# Patient Record
Sex: MALE | Race: WHITE | NOT HISPANIC OR LATINO | Employment: OTHER | URBAN - METROPOLITAN AREA
[De-identification: names, ages, dates, MRNs, and addresses within clinical notes are randomized per-mention and may not be internally consistent; named-entity substitution may affect disease eponyms.]

---

## 2017-05-25 ENCOUNTER — LAB VISIT (OUTPATIENT)
Dept: LAB | Facility: HOSPITAL | Age: 62
End: 2017-05-25
Payer: COMMERCIAL

## 2017-05-25 ENCOUNTER — OFFICE VISIT (OUTPATIENT)
Dept: FAMILY MEDICINE | Facility: CLINIC | Age: 62
End: 2017-05-25
Payer: COMMERCIAL

## 2017-05-25 VITALS
WEIGHT: 170.44 LBS | RESPIRATION RATE: 16 BRPM | DIASTOLIC BLOOD PRESSURE: 78 MMHG | HEART RATE: 62 BPM | SYSTOLIC BLOOD PRESSURE: 128 MMHG | HEIGHT: 69 IN | BODY MASS INDEX: 25.24 KG/M2 | OXYGEN SATURATION: 98 % | TEMPERATURE: 97 F

## 2017-05-25 DIAGNOSIS — E78.5 DYSLIPIDEMIA: ICD-10-CM

## 2017-05-25 DIAGNOSIS — Z00.00 ROUTINE ADULT HEALTH MAINTENANCE: ICD-10-CM

## 2017-05-25 DIAGNOSIS — I10 ESSENTIAL HYPERTENSION: Primary | ICD-10-CM

## 2017-05-25 DIAGNOSIS — Z11.59 NEED FOR HEPATITIS C SCREENING TEST: ICD-10-CM

## 2017-05-25 LAB
ALBUMIN SERPL BCP-MCNC: 4.1 G/DL
ALP SERPL-CCNC: 76 U/L
ALT SERPL W/O P-5'-P-CCNC: 17 U/L
ANION GAP SERPL CALC-SCNC: 12 MMOL/L
AST SERPL-CCNC: 25 U/L
BASOPHILS # BLD AUTO: 0.01 K/UL
BASOPHILS NFR BLD: 0.2 %
BILIRUB SERPL-MCNC: 0.7 MG/DL
BUN SERPL-MCNC: 12 MG/DL
CALCIUM SERPL-MCNC: 9.9 MG/DL
CHLORIDE SERPL-SCNC: 106 MMOL/L
CHOLEST/HDLC SERPL: 3.7 {RATIO}
CO2 SERPL-SCNC: 28 MMOL/L
COMPLEXED PSA SERPL-MCNC: 2.4 NG/ML
CREAT SERPL-MCNC: 1.3 MG/DL
DIFFERENTIAL METHOD: NORMAL
EOSINOPHIL # BLD AUTO: 0.1 K/UL
EOSINOPHIL NFR BLD: 1.4 %
ERYTHROCYTE [DISTWIDTH] IN BLOOD BY AUTOMATED COUNT: 12.6 %
EST. GFR  (AFRICAN AMERICAN): >60 ML/MIN/1.73 M^2
EST. GFR  (NON AFRICAN AMERICAN): 58.1 ML/MIN/1.73 M^2
GLUCOSE SERPL-MCNC: 75 MG/DL
HCT VFR BLD AUTO: 44.5 %
HDL/CHOLESTEROL RATIO: 26.8 %
HDLC SERPL-MCNC: 205 MG/DL
HDLC SERPL-MCNC: 55 MG/DL
HGB BLD-MCNC: 15 G/DL
LDLC SERPL CALC-MCNC: 134.4 MG/DL
LYMPHOCYTES # BLD AUTO: 1.3 K/UL
LYMPHOCYTES NFR BLD: 23.1 %
MCH RBC QN AUTO: 29.6 PG
MCHC RBC AUTO-ENTMCNC: 33.7 %
MCV RBC AUTO: 88 FL
MONOCYTES # BLD AUTO: 0.5 K/UL
MONOCYTES NFR BLD: 8.1 %
NEUTROPHILS # BLD AUTO: 3.8 K/UL
NEUTROPHILS NFR BLD: 67 %
NONHDLC SERPL-MCNC: 150 MG/DL
PLATELET # BLD AUTO: 243 K/UL
PMV BLD AUTO: 10.7 FL
POTASSIUM SERPL-SCNC: 4.1 MMOL/L
PROT SERPL-MCNC: 7.4 G/DL
RBC # BLD AUTO: 5.06 M/UL
SODIUM SERPL-SCNC: 146 MMOL/L
TRIGL SERPL-MCNC: 78 MG/DL
WBC # BLD AUTO: 5.59 K/UL

## 2017-05-25 PROCEDURE — 86803 HEPATITIS C AB TEST: CPT

## 2017-05-25 PROCEDURE — 99999 PR PBB SHADOW E&M-EST. PATIENT-LVL III: CPT | Mod: PBBFAC,,, | Performed by: INTERNAL MEDICINE

## 2017-05-25 PROCEDURE — 80053 COMPREHEN METABOLIC PANEL: CPT

## 2017-05-25 PROCEDURE — 99396 PREV VISIT EST AGE 40-64: CPT | Mod: S$GLB,,, | Performed by: INTERNAL MEDICINE

## 2017-05-25 PROCEDURE — 85025 COMPLETE CBC W/AUTO DIFF WBC: CPT

## 2017-05-25 PROCEDURE — 84153 ASSAY OF PSA TOTAL: CPT

## 2017-05-25 PROCEDURE — 36415 COLL VENOUS BLD VENIPUNCTURE: CPT | Mod: PO

## 2017-05-25 PROCEDURE — 80061 LIPID PANEL: CPT

## 2017-05-25 RX ORDER — AMLODIPINE AND BENAZEPRIL HYDROCHLORIDE 10; 20 MG/1; MG/1
1 CAPSULE ORAL DAILY
Qty: 30 CAPSULE | Refills: 12 | Status: SHIPPED | OUTPATIENT
Start: 2017-05-25 | End: 2018-07-09 | Stop reason: SDUPTHER

## 2017-05-25 NOTE — PROGRESS NOTES
Subjective:       Patient ID: Jose Long is a 61 y.o. male.    Chief Complaint: Annual Exam; Hypertension; and Hyperlipidemia    Hypertension   The problem is controlled. Pertinent negatives include no chest pain, headaches, neck pain, palpitations or shortness of breath. Past treatments include calcium channel blockers and ACE inhibitors. The current treatment provides significant improvement.   Hyperlipidemia   Pertinent negatives include no chest pain, myalgias or shortness of breath. Current antihyperlipidemic treatment includes exercise and diet change.     Review of Systems   Constitutional: Negative for activity change, appetite change, chills, diaphoresis, fatigue, fever and unexpected weight change.   HENT: Negative for drooling, ear discharge, ear pain, facial swelling, hearing loss, mouth sores, nosebleeds, postnasal drip, rhinorrhea, sinus pressure, sneezing, sore throat, tinnitus, trouble swallowing and voice change.    Eyes: Negative for photophobia, redness and visual disturbance.   Respiratory: Negative for apnea, cough, choking, chest tightness, shortness of breath and wheezing.    Cardiovascular: Negative for chest pain, palpitations and leg swelling.   Gastrointestinal: Negative for abdominal distention, abdominal pain, anal bleeding, blood in stool, constipation, diarrhea, nausea and vomiting.   Endocrine: Negative for cold intolerance, heat intolerance, polydipsia, polyphagia and polyuria.   Genitourinary: Negative for difficulty urinating, dysuria, enuresis, flank pain, frequency, genital sores, hematuria and urgency.   Musculoskeletal: Negative for arthralgias, back pain, gait problem, joint swelling, myalgias, neck pain and neck stiffness.   Skin: Negative for color change, pallor, rash and wound.   Allergic/Immunologic: Negative for food allergies and immunocompromised state.   Neurological: Negative for dizziness, tremors, seizures, syncope, facial asymmetry, speech difficulty, weakness,  light-headedness, numbness and headaches.   Hematological: Negative for adenopathy. Does not bruise/bleed easily.   Psychiatric/Behavioral: Negative for agitation, behavioral problems, confusion, decreased concentration, dysphoric mood, hallucinations, self-injury, sleep disturbance and suicidal ideas. The patient is not nervous/anxious and is not hyperactive.        Objective:      Physical Exam   Constitutional: He is oriented to person, place, and time. He appears well-developed and well-nourished. No distress.   HENT:   Head: Normocephalic and atraumatic.   Eyes: Pupils are equal, round, and reactive to light. No scleral icterus.   Neck: Normal range of motion. Neck supple. No JVD present. Carotid bruit is not present. No tracheal deviation present. No thyromegaly present.   Cardiovascular: Normal rate, regular rhythm, normal heart sounds and intact distal pulses.    Pulmonary/Chest: Effort normal and breath sounds normal. No respiratory distress. He has no wheezes. He has no rales. He exhibits no tenderness.   Abdominal: Soft. Bowel sounds are normal. He exhibits no distension. There is no tenderness. There is no rebound and no guarding.   Musculoskeletal: Normal range of motion. He exhibits no edema or tenderness.   Lymphadenopathy:     He has no cervical adenopathy.   Neurological: He is alert and oriented to person, place, and time. No cranial nerve deficit. Coordination normal.   Skin: Skin is warm and dry. No rash noted. He is not diaphoretic. No erythema. No pallor.   Psychiatric: He has a normal mood and affect. His behavior is normal. Judgment and thought content normal.       Assessment:       1. Essential hypertension    2. Dyslipidemia    3. Routine adult health maintenance        Plan:        stable-------------continue meds, watch diet,exercise.                  Notes/labs reviewed.           Check cbc,cmp,lipids,psa.                     F/u prn or 1 year-------

## 2017-05-26 ENCOUNTER — TELEPHONE (OUTPATIENT)
Dept: FAMILY MEDICINE | Facility: CLINIC | Age: 62
End: 2017-05-26

## 2017-05-26 LAB — HCV AB SERPL QL IA: NEGATIVE

## 2018-01-06 ENCOUNTER — OFFICE VISIT (OUTPATIENT)
Dept: URGENT CARE | Facility: CLINIC | Age: 63
End: 2018-01-06
Payer: COMMERCIAL

## 2018-01-06 VITALS
WEIGHT: 173.38 LBS | DIASTOLIC BLOOD PRESSURE: 84 MMHG | HEART RATE: 85 BPM | SYSTOLIC BLOOD PRESSURE: 130 MMHG | BODY MASS INDEX: 25.61 KG/M2 | OXYGEN SATURATION: 96 % | TEMPERATURE: 99 F

## 2018-01-06 DIAGNOSIS — J06.9 VIRAL URI: Primary | ICD-10-CM

## 2018-01-06 PROCEDURE — 99213 OFFICE O/P EST LOW 20 MIN: CPT | Mod: S$GLB,,, | Performed by: FAMILY MEDICINE

## 2018-01-06 PROCEDURE — 99999 PR PBB SHADOW E&M-EST. PATIENT-LVL III: CPT | Mod: PBBFAC,,, | Performed by: FAMILY MEDICINE

## 2018-01-06 NOTE — PATIENT INSTRUCTIONS
Viral Upper Respiratory Illness (Adult)  You have a viral upper respiratory illness (URI), which is another term for the common cold. This illness is contagious during the first few days. It is spread through the air by coughing and sneezing. It may also be spread by direct contact (touching the sick person and then touching your own eyes, nose, or mouth). Frequent handwashing will decrease risk of spread. Most viral illnesses go away within 7 to 10 days with rest and simple home remedies. Sometimes the illness may last for several weeks. Antibiotics will not kill a virus, and they are generally not prescribed for this condition.    Home care  · If symptoms are severe, rest at home for the first 2 to 3 days. When you resume activity, don't let yourself get too tired.  · Avoid being exposed to cigarette smoke (yours or others).  · You may use acetaminophen or ibuprofen to control pain and fever, unless another medicine was prescribed. (Note: If you have chronic liver or kidney disease, have ever had a stomach ulcer or gastrointestinal bleeding, or are taking blood-thinning medicines, talk with your healthcare provider before using these medicines.) Aspirin should never be given to anyone under 18 years of age who is ill with a viral infection or fever. It may cause severe liver or brain damage.  · Your appetite may be poor, so a light diet is fine. Avoid dehydration by drinking 6 to 8 glasses of fluids per day (water, soft drinks, juices, tea, or soup). Extra fluids will help loosen secretions in the nose and lungs.  · Over-the-counter cold medicines will not shorten the length of time youre sick, but they may be helpful for the following symptoms: cough, sore throat, and nasal and sinus congestion. (Note: Do not use decongestants if you have high blood pressure.)  Follow-up care  Follow up with your healthcare provider, or as advised.  When to seek medical advice  Call your healthcare provider right away if any  of these occur:  · Cough with lots of colored sputum (mucus)  · Severe headache; face, neck, or ear pain  · Difficulty swallowing due to throat pain  · Fever of 100.4°F (38°C)  Call 911, or get immediate medical care  Call emergency services right away if any of these occur:  · Chest pain, shortness of breath, wheezing, or difficulty breathing  · Coughing up blood  · Inability to swallow due to throat pain  Date Last Reviewed: 9/13/2015  © 2725-9289 [employer]. [address] [place] [id number]. All rights reserved. This information is not intended as a substitute for professional medical care. Always follow your healthcare professional's instructions.

## 2018-01-06 NOTE — PROGRESS NOTES
Subjective:      Patient ID: Jose Long is a 62 y.o. male.    Chief Complaint: Sinus Problem      Past Medical History:   Diagnosis Date    Dyslipidemia     Hypertension     Multiple allergies      No past surgical history on file.  Family History   Problem Relation Age of Onset    Cataracts Mother     Hypertension Mother     Diabetes Maternal Aunt     Macular degeneration Maternal Aunt     Diabetes Paternal Grandmother     Melanoma Neg Hx      Social History     Social History    Marital status: Single     Spouse name: N/A    Number of children: N/A    Years of education: N/A     Occupational History    Not on file.     Social History Main Topics    Smoking status: Former Smoker    Smokeless tobacco: Never Used    Alcohol use No    Drug use: No    Sexual activity: Not on file     Other Topics Concern    Not on file     Social History Narrative    No narrative on file       Current Outpatient Prescriptions:     alprazolam (XANAX) 0.25 MG tablet, Take 1 tablet (0.25 mg total) by mouth 3 (three) times daily as needed for Anxiety (to use prior to dental procedure)., Disp: 10 tablet, Rfl: 0    amlodipine-benazepril 10-20mg (LOTREL) 10-20 mg per capsule, Take 1 capsule by mouth once daily., Disp: 30 capsule, Rfl: 12    cetirizine (ZYRTEC) 10 MG tablet, Take 1 tablet by mouth Daily. 1 Tablet Oral Every day.  To get over-the-counter, Disp: , Rfl:   Review of patient's allergies indicates:  No Known Allergies    Review of Systems   Constitutional: Negative for chills, fatigue and fever.   HENT: Positive for congestion and postnasal drip. Negative for sinus pain and sinus pressure.    Respiratory: Positive for cough. Negative for shortness of breath and wheezing.    Cardiovascular: Negative for chest pain and palpitations.   Gastrointestinal: Negative for abdominal pain and blood in stool.     HPI  Mild symptoms of nasal drainage and postnasal drip.  Cough worse at night.  No sob.  No fever or  chills.  No other symptoms.    Objective:   /84 (BP Location: Left arm, Patient Position: Sitting, BP Method: Small (Manual))   Pulse 85   Temp 99.2 °F (37.3 °C) (Tympanic)   Wt 78.7 kg (173 lb 6.3 oz)   SpO2 96%   BMI 25.61 kg/m²      Physical Exam   Constitutional: He is oriented to person, place, and time. He is cooperative. No distress.   HENT:   Right Ear: Hearing, tympanic membrane, external ear and ear canal normal.   Left Ear: Hearing, tympanic membrane, external ear and ear canal normal.   Mouth/Throat: Uvula is midline, oropharynx is clear and moist and mucous membranes are normal.   Eyes: Conjunctivae and EOM are normal.   Cardiovascular: Normal rate, regular rhythm and normal heart sounds.    Pulmonary/Chest: Effort normal and breath sounds normal.   Musculoskeletal: He exhibits no edema.   Neurological: He is alert and oriented to person, place, and time. Coordination and gait normal.   Skin: Skin is warm, dry and intact. No rash noted. He is not diaphoretic. No erythema.   Psychiatric: He has a normal mood and affect. His speech is normal and behavior is normal.   Nursing note and vitals reviewed.          Assessment:       1. Viral URI            Plan:         Viral URI  Comments:  Self limited.  F/u prn.  Plenty of rest.   f/u if not resolved in a week.        Patient Care Team:  Harshad Hernandez MD as PCP - General (Internal Medicine)    No Follow-up on file.

## 2018-05-25 ENCOUNTER — OFFICE VISIT (OUTPATIENT)
Dept: FAMILY MEDICINE | Facility: CLINIC | Age: 63
End: 2018-05-25
Payer: COMMERCIAL

## 2018-05-25 ENCOUNTER — LAB VISIT (OUTPATIENT)
Dept: LAB | Facility: HOSPITAL | Age: 63
End: 2018-05-25
Attending: INTERNAL MEDICINE
Payer: COMMERCIAL

## 2018-05-25 VITALS
TEMPERATURE: 97 F | WEIGHT: 168 LBS | HEART RATE: 74 BPM | DIASTOLIC BLOOD PRESSURE: 70 MMHG | SYSTOLIC BLOOD PRESSURE: 130 MMHG | BODY MASS INDEX: 24.05 KG/M2 | HEIGHT: 70 IN

## 2018-05-25 DIAGNOSIS — I10 HYPERTENSION, UNSPECIFIED TYPE: Chronic | ICD-10-CM

## 2018-05-25 DIAGNOSIS — Z12.11 COLON CANCER SCREENING: ICD-10-CM

## 2018-05-25 DIAGNOSIS — E78.5 DYSLIPIDEMIA: ICD-10-CM

## 2018-05-25 DIAGNOSIS — Z00.00 ROUTINE ADULT HEALTH MAINTENANCE: ICD-10-CM

## 2018-05-25 DIAGNOSIS — I10 HYPERTENSION, UNSPECIFIED TYPE: Primary | Chronic | ICD-10-CM

## 2018-05-25 LAB
ALBUMIN SERPL BCP-MCNC: 4.2 G/DL
ALP SERPL-CCNC: 73 U/L
ALT SERPL W/O P-5'-P-CCNC: 21 U/L
ANION GAP SERPL CALC-SCNC: 12 MMOL/L
AST SERPL-CCNC: 23 U/L
BASOPHILS # BLD AUTO: 0.04 K/UL
BASOPHILS NFR BLD: 0.7 %
BILIRUB SERPL-MCNC: 0.6 MG/DL
BUN SERPL-MCNC: 14 MG/DL
CALCIUM SERPL-MCNC: 9.5 MG/DL
CHLORIDE SERPL-SCNC: 105 MMOL/L
CHOLEST SERPL-MCNC: 181 MG/DL
CHOLEST/HDLC SERPL: 3.9 {RATIO}
CO2 SERPL-SCNC: 27 MMOL/L
COMPLEXED PSA SERPL-MCNC: 2.7 NG/ML
CREAT SERPL-MCNC: 1.4 MG/DL
DIFFERENTIAL METHOD: NORMAL
EOSINOPHIL # BLD AUTO: 0.1 K/UL
EOSINOPHIL NFR BLD: 1.2 %
ERYTHROCYTE [DISTWIDTH] IN BLOOD BY AUTOMATED COUNT: 12.2 %
EST. GFR  (AFRICAN AMERICAN): >60 ML/MIN/1.73 M^2
EST. GFR  (NON AFRICAN AMERICAN): 52.7 ML/MIN/1.73 M^2
GLUCOSE SERPL-MCNC: 88 MG/DL
HCT VFR BLD AUTO: 44 %
HDLC SERPL-MCNC: 46 MG/DL
HDLC SERPL: 25.4 %
HGB BLD-MCNC: 14.8 G/DL
IMM GRANULOCYTES # BLD AUTO: 0.02 K/UL
IMM GRANULOCYTES NFR BLD AUTO: 0.4 %
LDLC SERPL CALC-MCNC: 120.8 MG/DL
LYMPHOCYTES # BLD AUTO: 1.3 K/UL
LYMPHOCYTES NFR BLD: 23.6 %
MCH RBC QN AUTO: 29.8 PG
MCHC RBC AUTO-ENTMCNC: 33.6 G/DL
MCV RBC AUTO: 89 FL
MONOCYTES # BLD AUTO: 0.4 K/UL
MONOCYTES NFR BLD: 7.4 %
NEUTROPHILS # BLD AUTO: 3.8 K/UL
NEUTROPHILS NFR BLD: 66.7 %
NONHDLC SERPL-MCNC: 135 MG/DL
NRBC BLD-RTO: 0 /100 WBC
PLATELET # BLD AUTO: 220 K/UL
PMV BLD AUTO: 11.9 FL
POTASSIUM SERPL-SCNC: 3.8 MMOL/L
PROT SERPL-MCNC: 7.3 G/DL
RBC # BLD AUTO: 4.97 M/UL
SODIUM SERPL-SCNC: 144 MMOL/L
TRIGL SERPL-MCNC: 71 MG/DL
WBC # BLD AUTO: 5.64 K/UL

## 2018-05-25 PROCEDURE — 84153 ASSAY OF PSA TOTAL: CPT

## 2018-05-25 PROCEDURE — 99999 PR PBB SHADOW E&M-EST. PATIENT-LVL V: CPT | Mod: PBBFAC,,, | Performed by: INTERNAL MEDICINE

## 2018-05-25 PROCEDURE — 3078F DIAST BP <80 MM HG: CPT | Mod: CPTII,S$GLB,, | Performed by: INTERNAL MEDICINE

## 2018-05-25 PROCEDURE — 3075F SYST BP GE 130 - 139MM HG: CPT | Mod: CPTII,S$GLB,, | Performed by: INTERNAL MEDICINE

## 2018-05-25 PROCEDURE — 85025 COMPLETE CBC W/AUTO DIFF WBC: CPT

## 2018-05-25 PROCEDURE — 36415 COLL VENOUS BLD VENIPUNCTURE: CPT | Mod: PO

## 2018-05-25 PROCEDURE — 80053 COMPREHEN METABOLIC PANEL: CPT

## 2018-05-25 PROCEDURE — 99396 PREV VISIT EST AGE 40-64: CPT | Mod: S$GLB,,, | Performed by: INTERNAL MEDICINE

## 2018-05-25 PROCEDURE — 80061 LIPID PANEL: CPT

## 2018-05-25 RX ORDER — LORATADINE 10 MG/1
TABLET ORAL
COMMUNITY
End: 2019-06-24

## 2018-05-25 NOTE — PROGRESS NOTES
Subjective:       Patient ID: Jose Long is a 62 y.o. male.    Chief Complaint: Annual Exam; Hypertension; and Hyperlipidemia    Hypertension   This is a chronic problem. The problem is controlled. Pertinent negatives include no chest pain, headaches, neck pain, palpitations or shortness of breath.   Hyperlipidemia   Pertinent negatives include no chest pain, myalgias or shortness of breath.     Past Medical History:   Diagnosis Date    Dyslipidemia     Hypertension     Multiple allergies      No past surgical history on file.  Family History   Problem Relation Age of Onset    Cataracts Mother     Hypertension Mother     Diabetes Maternal Aunt     Macular degeneration Maternal Aunt     Diabetes Paternal Grandmother     Melanoma Neg Hx      Social History     Social History    Marital status: Single     Spouse name: N/A    Number of children: N/A    Years of education: N/A     Occupational History    Not on file.     Social History Main Topics    Smoking status: Former Smoker    Smokeless tobacco: Never Used    Alcohol use No    Drug use: No    Sexual activity: Not on file     Other Topics Concern    Not on file     Social History Narrative    No narrative on file     Review of Systems   Constitutional: Negative for activity change, appetite change, chills, diaphoresis, fatigue, fever and unexpected weight change.   HENT: Negative for drooling, ear discharge, ear pain, facial swelling, hearing loss, mouth sores, nosebleeds, postnasal drip, rhinorrhea, sinus pressure, sneezing, sore throat, tinnitus, trouble swallowing and voice change.    Eyes: Negative for photophobia, discharge, redness and visual disturbance.   Respiratory: Negative for apnea, cough, choking, chest tightness, shortness of breath and wheezing.    Cardiovascular: Negative for chest pain, palpitations and leg swelling.   Gastrointestinal: Negative for abdominal distention, abdominal pain, anal bleeding, blood in stool,  constipation, diarrhea, nausea and vomiting.   Endocrine: Negative for cold intolerance, heat intolerance, polydipsia, polyphagia and polyuria.   Genitourinary: Negative for difficulty urinating, dysuria, enuresis, flank pain, frequency, genital sores, hematuria and urgency.   Musculoskeletal: Negative for arthralgias, back pain, gait problem, joint swelling, myalgias, neck pain and neck stiffness.   Skin: Negative for color change, pallor, rash and wound.   Allergic/Immunologic: Negative for food allergies and immunocompromised state.   Neurological: Negative for dizziness, tremors, seizures, syncope, facial asymmetry, speech difficulty, weakness, light-headedness, numbness and headaches.   Hematological: Negative for adenopathy. Does not bruise/bleed easily.   Psychiatric/Behavioral: Negative for agitation, behavioral problems, confusion, decreased concentration, dysphoric mood, hallucinations, self-injury, sleep disturbance and suicidal ideas. The patient is not nervous/anxious and is not hyperactive.        Objective:      Physical Exam   Constitutional: He is oriented to person, place, and time. He appears well-developed and well-nourished. No distress.   HENT:   Head: Normocephalic and atraumatic.   Eyes: Pupils are equal, round, and reactive to light. No scleral icterus.   Neck: Normal range of motion. Neck supple. No JVD present. Carotid bruit is not present. No tracheal deviation present. No thyromegaly present.   Cardiovascular: Normal rate, regular rhythm, normal heart sounds and intact distal pulses.    Pulmonary/Chest: Effort normal and breath sounds normal. No respiratory distress. He has no wheezes. He has no rales. He exhibits no tenderness.   Abdominal: Soft. Bowel sounds are normal. He exhibits no distension. There is no tenderness. There is no rebound and no guarding.   Musculoskeletal: Normal range of motion. He exhibits no edema or tenderness.   Lymphadenopathy:     He has no cervical  adenopathy.   Neurological: He is alert and oriented to person, place, and time. No cranial nerve deficit. Coordination normal.   Skin: Skin is warm and dry. No rash noted. He is not diaphoretic. No erythema. No pallor.   Psychiatric: He has a normal mood and affect. His behavior is normal. Judgment and thought content normal.   Nursing note and vitals reviewed.      CMP  Sodium   Date Value Ref Range Status   05/25/2017 146 (H) 136 - 145 mmol/L Final     Potassium   Date Value Ref Range Status   05/25/2017 4.1 3.5 - 5.1 mmol/L Final     Chloride   Date Value Ref Range Status   05/25/2017 106 95 - 110 mmol/L Final     CO2   Date Value Ref Range Status   05/25/2017 28 23 - 29 mmol/L Final     Glucose   Date Value Ref Range Status   05/25/2017 75 70 - 110 mg/dL Final     BUN, Bld   Date Value Ref Range Status   05/25/2017 12 8 - 23 mg/dL Final     Creatinine   Date Value Ref Range Status   05/25/2017 1.3 0.5 - 1.4 mg/dL Final     Calcium   Date Value Ref Range Status   05/25/2017 9.9 8.7 - 10.5 mg/dL Final     Total Protein   Date Value Ref Range Status   05/25/2017 7.4 6.0 - 8.4 g/dL Final     Albumin   Date Value Ref Range Status   05/25/2017 4.1 3.5 - 5.2 g/dL Final     Total Bilirubin   Date Value Ref Range Status   05/25/2017 0.7 0.1 - 1.0 mg/dL Final     Comment:     For infants and newborns, interpretation of results should be based  on gestational age, weight and in agreement with clinical  observations.  Premature Infant recommended reference ranges:  Up to 24 hours.............<8.0 mg/dL  Up to 48 hours............<12.0 mg/dL  3-5 days..................<15.0 mg/dL  6-29 days.................<15.0 mg/dL       Alkaline Phosphatase   Date Value Ref Range Status   05/25/2017 76 55 - 135 U/L Final     AST   Date Value Ref Range Status   05/25/2017 25 10 - 40 U/L Final     ALT   Date Value Ref Range Status   05/25/2017 17 10 - 44 U/L Final     Anion Gap   Date Value Ref Range Status   05/25/2017 12 8 - 16 mmol/L  Final     eGFR if    Date Value Ref Range Status   05/25/2017 >60.0 >60 mL/min/1.73 m^2 Final     eGFR if non    Date Value Ref Range Status   05/25/2017 58.1 (A) >60 mL/min/1.73 m^2 Final     Comment:     Calculation used to obtain the estimated glomerular filtration  rate (eGFR) is the CKD-EPI equation. Since race is unknown   in our information system, the eGFR values for   -American and Non--American patients are given   for each creatinine result.       Lab Results   Component Value Date    WBC 5.59 05/25/2017    HGB 15.0 05/25/2017    HCT 44.5 05/25/2017    MCV 88 05/25/2017     05/25/2017     Lab Results   Component Value Date    CHOL 205 (H) 05/25/2017     Lab Results   Component Value Date    HDL 55 05/25/2017     Lab Results   Component Value Date    LDLCALC 134.4 05/25/2017     Lab Results   Component Value Date    TRIG 78 05/25/2017     Lab Results   Component Value Date    CHOLHDL 26.8 05/25/2017     Lab Results   Component Value Date    TSH 1.198 01/16/2015     No results found for: LABA1C, HGBA1C  Assessment:       1. Hypertension, unspecified type    2. Dyslipidemia    3. Colon cancer screening    4. Routine adult health maintenance        Plan:   Hypertension, unspecified type-----------------controlled.  -     Comprehensive metabolic panel; Future; Expected date: 05/25/2018  -     CBC auto differential; Future; Expected date: 05/25/2018    Dyslipidemia------watch diet,exercise.  -     Lipid panel; Future; Expected date: 05/25/2018    Colon cancer screening  -     Case request GI: COLONOSCOPY    Routine adult health maintenance  -     Comprehensive metabolic panel; Future; Expected date: 05/25/2018  -     CBC auto differential; Future; Expected date: 05/25/2018  -     PSA, Screening; Future; Expected date: 05/25/2018    Other orders  -     Cancel: Case request GI: COLONOSCOPY                F/u 6-12 months.

## 2018-05-29 ENCOUNTER — DOCUMENTATION ONLY (OUTPATIENT)
Dept: ENDOSCOPY | Facility: HOSPITAL | Age: 63
End: 2018-05-29

## 2018-05-29 RX ORDER — SODIUM, POTASSIUM,MAG SULFATES 17.5-3.13G
SOLUTION, RECONSTITUTED, ORAL ORAL
Qty: 354 ML | Refills: 0 | Status: ON HOLD | OUTPATIENT
Start: 2018-05-29 | End: 2018-06-29 | Stop reason: HOSPADM

## 2018-05-29 NOTE — PROGRESS NOTES
05/29/18 Per [employer], Person pressed touch tone key to speak with an endoscopy .  Pt has been scheduled for colonoscopy on 06/29/18. Oral instructions given and mailed. Suprep ordered.

## 2018-06-28 PROBLEM — Z12.11 COLON CANCER SCREENING: Status: ACTIVE | Noted: 2018-06-28

## 2018-06-29 ENCOUNTER — ANESTHESIA (OUTPATIENT)
Dept: ENDOSCOPY | Facility: HOSPITAL | Age: 63
End: 2018-06-29
Payer: COMMERCIAL

## 2018-06-29 ENCOUNTER — ANESTHESIA EVENT (OUTPATIENT)
Dept: ENDOSCOPY | Facility: HOSPITAL | Age: 63
End: 2018-06-29
Payer: COMMERCIAL

## 2018-06-29 ENCOUNTER — HOSPITAL ENCOUNTER (OUTPATIENT)
Facility: HOSPITAL | Age: 63
Discharge: HOME | End: 2018-06-29
Attending: FAMILY MEDICINE | Admitting: FAMILY MEDICINE
Payer: COMMERCIAL

## 2018-06-29 ENCOUNTER — SURGERY (OUTPATIENT)
Age: 63
End: 2018-06-29

## 2018-06-29 DIAGNOSIS — Z12.11 COLON CANCER SCREENING: Primary | ICD-10-CM

## 2018-06-29 DIAGNOSIS — Z80.0 FAMILY HISTORY OF COLON CANCER: ICD-10-CM

## 2018-06-29 DIAGNOSIS — Z86.010 PERSONAL HISTORY OF COLONIC POLYPS: ICD-10-CM

## 2018-06-29 DIAGNOSIS — K63.5 POLYP OF COLON, UNSPECIFIED PART OF COLON, UNSPECIFIED TYPE: ICD-10-CM

## 2018-06-29 DIAGNOSIS — Z12.11 SPECIAL SCREENING FOR MALIGNANT NEOPLASMS, COLON: ICD-10-CM

## 2018-06-29 PROCEDURE — 88305 TISSUE EXAM BY PATHOLOGIST: CPT | Mod: 26,,, | Performed by: PATHOLOGY

## 2018-06-29 PROCEDURE — 25000003 PHARM REV CODE 250: Performed by: NURSE ANESTHETIST, CERTIFIED REGISTERED

## 2018-06-29 PROCEDURE — 27201089 HC SNARE, DISP (ANY): Performed by: FAMILY MEDICINE

## 2018-06-29 PROCEDURE — 63600175 PHARM REV CODE 636 W HCPCS: Performed by: NURSE ANESTHETIST, CERTIFIED REGISTERED

## 2018-06-29 PROCEDURE — 45385 COLONOSCOPY W/LESION REMOVAL: CPT | Mod: 33,,, | Performed by: FAMILY MEDICINE

## 2018-06-29 PROCEDURE — 25000003 PHARM REV CODE 250: Performed by: FAMILY MEDICINE

## 2018-06-29 PROCEDURE — 88305 TISSUE EXAM BY PATHOLOGIST: CPT | Performed by: PATHOLOGY

## 2018-06-29 PROCEDURE — 45385 COLONOSCOPY W/LESION REMOVAL: CPT | Performed by: FAMILY MEDICINE

## 2018-06-29 PROCEDURE — 37000008 HC ANESTHESIA 1ST 15 MINUTES: Performed by: FAMILY MEDICINE

## 2018-06-29 PROCEDURE — 37000009 HC ANESTHESIA EA ADD 15 MINS: Performed by: FAMILY MEDICINE

## 2018-06-29 RX ORDER — SODIUM CHLORIDE, SODIUM LACTATE, POTASSIUM CHLORIDE, CALCIUM CHLORIDE 600; 310; 30; 20 MG/100ML; MG/100ML; MG/100ML; MG/100ML
INJECTION, SOLUTION INTRAVENOUS CONTINUOUS
Status: DISCONTINUED | OUTPATIENT
Start: 2018-06-29 | End: 2018-06-29 | Stop reason: HOSPADM

## 2018-06-29 RX ORDER — LIDOCAINE HYDROCHLORIDE 20 MG/ML
INJECTION, SOLUTION EPIDURAL; INFILTRATION; INTRACAUDAL; PERINEURAL
Status: DISCONTINUED | OUTPATIENT
Start: 2018-06-29 | End: 2018-06-29

## 2018-06-29 RX ORDER — PROPOFOL 10 MG/ML
VIAL (ML) INTRAVENOUS
Status: DISCONTINUED | OUTPATIENT
Start: 2018-06-29 | End: 2018-06-29

## 2018-06-29 RX ADMIN — PROPOFOL 20 MG: 10 INJECTION, EMULSION INTRAVENOUS at 11:06

## 2018-06-29 RX ADMIN — LIDOCAINE HYDROCHLORIDE 60 MG: 20 INJECTION, SOLUTION EPIDURAL; INFILTRATION; INTRACAUDAL; PERINEURAL at 11:06

## 2018-06-29 RX ADMIN — PROPOFOL 25 MG: 10 INJECTION, EMULSION INTRAVENOUS at 11:06

## 2018-06-29 RX ADMIN — SODIUM CHLORIDE, SODIUM LACTATE, POTASSIUM CHLORIDE, AND CALCIUM CHLORIDE: .6; .31; .03; .02 INJECTION, SOLUTION INTRAVENOUS at 10:06

## 2018-06-29 RX ADMIN — PROPOFOL 80 MG: 10 INJECTION, EMULSION INTRAVENOUS at 11:06

## 2018-06-29 RX ADMIN — PROPOFOL 50 MG: 10 INJECTION, EMULSION INTRAVENOUS at 11:06

## 2018-06-29 NOTE — ANESTHESIA RELEASE NOTE
"Anesthesia Release from PACU Note    Patient: Jose Long    Procedure(s) Performed: Procedure(s) (LRB):  COLONOSCOPY (N/A)    Anesthesia type: MAC    Post pain: Adequate analgesia    Post assessment: no apparent anesthetic complications, tolerated procedure well and no evidence of recall    Last Vitals:   Visit Vitals  BP (!) 157/78 (BP Location: Left arm, Patient Position: Lying)   Pulse 75   Temp 36.6 °C (97.8 °F) (Oral)   Resp 18   Ht 5' 10" (1.778 m)   Wt 73.9 kg (163 lb)   SpO2 97%   BMI 23.39 kg/m²       Post vital signs: stable    Level of consciousness: awake, alert  and oriented    Nausea/Vomiting: no nausea/no vomiting    Complications: none    Airway Patency: patent    Respiratory: unassisted, spontaneous ventilation, room air    Cardiovascular: stable and blood pressure at baseline    Hydration: euvolemic  "

## 2018-06-29 NOTE — H&P
Short Stay Endoscopy History and Physical    PCP - Harshad Hernandez MD    Procedure - Colonoscopy  ASA - 2  Mallampati - per anesthesia  History of Anesthesia problems - no  Family history Anesthesia problems -  no     HPI:  This is a 62 y.o. male here for evaluation of :   Active Hospital Problems    Diagnosis  POA    *Colon cancer screening [Z12.11]  Not Applicable    Special screening for malignant neoplasms, colon [Z12.11]  Not Applicable    Personal history of colonic polyps [Z86.010]  Not Applicable      Resolved Hospital Problems    Diagnosis Date Resolved POA   No resolved problems to display.         Health Maintenance       Date Due Completion Date    Colonoscopy 02/10/2018 2/10/2015    Influenza Vaccine 08/01/2018 10/10/2015    PROSTATE-SPECIFIC ANTIGEN 05/25/2019 5/25/2018    Override on 11/8/2012: Done    Lipid Panel 05/25/2019 5/25/2018          Screening - yes  History of polyps - yes  Diarrhea - no  Anemia - no  Blood in stools - no  Abdominal pain - no  Other - no    ROS:  CONSTITUTIONAL: Denies weight change,  fatigue, fevers, chills, night sweats.  CARDIOVASCULAR: Denies chest pain, shortness of breath, orthopnea and edema.  RESPIRATORY: Denies cough, hemoptysis, dyspnea, and wheezing.  GI: See HPI.    Medical History:   Past Medical History:   Diagnosis Date    Dyslipidemia     Hypertension     Multiple allergies        Surgical History:   History reviewed. No pertinent surgical history.    Family History:   Family History   Problem Relation Age of Onset    Cataracts Mother     Hypertension Mother     Alzheimer's disease Mother     Diabetes Maternal Aunt     Macular degeneration Maternal Aunt     Diabetes Paternal Grandmother     Colon cancer Father     Melanoma Neg Hx        Social History:   Social History   Substance Use Topics    Smoking status: Former Smoker    Smokeless tobacco: Never Used    Alcohol use No       Allergies:   Review of patient's allergies  indicates:  No Known Allergies    Medications:   No current facility-administered medications on file prior to encounter.      Current Outpatient Prescriptions on File Prior to Encounter   Medication Sig Dispense Refill    amlodipine-benazepril 10-20mg (LOTREL) 10-20 mg per capsule Take 1 capsule by mouth once daily. 30 capsule 12    cetirizine (ZYRTEC) 10 MG tablet Take 1 tablet by mouth Daily. 1 Tablet Oral Every day.  To get over-the-counter      loratadine (CLARITIN) 10 mg tablet       alprazolam (XANAX) 0.25 MG tablet Take 1 tablet (0.25 mg total) by mouth 3 (three) times daily as needed for Anxiety (to use prior to dental procedure). 10 tablet 0       Physical Exam:  Vital Signs:   Vitals:    06/29/18 0957   BP: (!) 157/78   Pulse: 75   Resp: 18   Temp: 97.8 °F (36.6 °C)     General Appearance: Well appearing in no acute distress  ENT: OP clear  Chest: CTA B  CV: RRR, no m/r/g  Abd: s/nt/nd/nabs  Ext: no edema    Labs:Reviewed    IMP:  Active Hospital Problems    Diagnosis  POA    *Colon cancer screening [Z12.11]  Not Applicable    Special screening for malignant neoplasms, colon [Z12.11]  Not Applicable    Personal history of colonic polyps [Z86.010]  Not Applicable      Resolved Hospital Problems    Diagnosis Date Resolved POA   No resolved problems to display.         Plan:   I have explained the risks and benefits of colonoscopy to the patient including but not limited to bleeding, perforation, infection, and death. The patient wishes to proceed.

## 2018-06-29 NOTE — [employer] PATIENT INSTRUCTIONS
Discharge Summary/Instructions after an Endoscopic Procedure  Patient Name: Jose Long  Patient MRN: [id number]  Patient YOB: 1955 Friday, June 29, 2018 Kermit Leone MD  RESTRICTIONS:  During your procedure today, you received medications for sedation.  These   medications may affect your judgment, balance and coordination.  Therefore,   for 24 hours, you have the following restrictions:   - DO NOT drive a car, operate machinery, make legal/financial decisions,   sign important papers or drink alcohol.    ACTIVITY:  Today: no heavy lifting, straining or running due to procedural   sedation/anesthesia.  The following day: return to full activity including work.  DIET:  Eat and drink normally unless instructed otherwise.     TREATMENT FOR COMMON SIDE EFFECTS:  - Mild abdominal pain, nausea, belching, bloating or excessive gas:  rest,   eat lightly and use a heating pad.  - Sore Throat: treat with throat lozenges and/or gargle with warm salt   water.  - Because air was used during the procedure, expelling large amounts of air   from your rectum or belching is normal.  - If a bowel prep was taken, you may not have a bowel movement for 1-3 days.    This is normal.  SYMPTOMS TO WATCH FOR AND REPORT TO YOUR PHYSICIAN:  1. Abdominal pain or bloating, other than gas cramps.  2. Chest pain.  3. Back pain.  4. Signs of infection such as: chills or fever occurring within 24 hours   after the procedure.  5. Rectal bleeding, which would show as bright red, maroon, or black stools.   (A tablespoon of blood from the rectum is not serious, especially if   hemorrhoids are present.)  6. Vomiting.  7. Weakness or dizziness.  GO DIRECTLY TO THE NEAREST EMERGENCY ROOM IF YOU HAVE ANY OF THE FOLLOWING:      Difficulty breathing              Chills and/or fever over 101 F   Persistent vomiting and/or vomiting blood   Severe abdominal pain   Severe chest pain   Black, tarry stools   Bleeding- more than one tablespoon   Any  other symptom or condition that you feel may need urgent attention  Your doctor recommends these additional instructions:  If any biopsies were taken, your doctors clinic will contact you in 1 to 2   weeks with any results.  - Patient has a contact number available for emergencies.  The signs and   symptoms of potential delayed complications were discussed with the   patient.  Return to normal activities tomorrow.  Written discharge   instructions were provided to the patient.   - Resume previous diet.   - Continue present medications.   - Await pathology results.   - Repeat colonoscopy in 5 years for surveillance.   - Telephone my office for pathology results in 1 week.   - Discharge patient to home (via wheelchair).  For questions, problems or results please call your physician Kermit Leone MD at Work:  [phone]  If you have any questions about the above instructions, call the GI   department at [phone] or call the endoscopy unit at [phone]   from 7am until 3 pm.  [facility] [place], EMERGENCY ROOM PHONE NUMBER:   [phone]  IF A COMPLICATION OR EMERGENCY SITUATION ARISES AND YOU ARE UNABLE TO REACH   YOUR PHYSICIAN - GO DIRECTLY TO THE EMERGENCY ROOM.  I have read or have had read to me these discharge instructions for my   procedure and have received a written copy.  I understand these   instructions and will follow-up with my physician if I have any questions.     __________________________________       _____________________________________  Nurse Signature                                          Patient/Designated   Responsible Party Signature  Kermit Leone MD  6/29/2018 11:29:28 AM  This report has been verified and signed electronically.  [employer]

## 2018-06-29 NOTE — ANESTHESIA PREPROCEDURE EVALUATION
06/29/2018  Jose Long is a 62 y.o., male.    Anesthesia Evaluation    I have reviewed the Patient Summary Reports.    I have reviewed the Nursing Notes.   I have reviewed the Medications.     Review of Systems  Anesthesia Hx:  No problems with previous Anesthesia Denies Hx of Anesthetic complications  History of prior surgery of interest to airway management or planning: Previous anesthesia: General, MAC Denies Family Hx of Anesthesia complications.   Denies Personal Hx of Anesthesia complications.   Social:  Former Smoker, No Alcohol Use    Hematology/Oncology:  Hematology Normal   Oncology Normal     EENT/Dental:EENT/Dental Normal   Cardiovascular:   Hypertension, well controlled hyperlipidemia    Pulmonary:  Pulmonary Normal    Renal/:  Renal/ Normal     Hepatic/GI:  Hepatic/GI Normal    Musculoskeletal:  Musculoskeletal Normal    Neurological:  Neurology Normal    Endocrine:  Endocrine Normal    Dermatological:  Skin Normal    Psych:  Psychiatric Normal           Physical Exam  General:  Well nourished    Airway/Jaw/Neck:  Airway Findings: Mouth Opening: Normal Tongue: Normal  General Airway Assessment: Adult  Mallampati: II  TM Distance: Normal, at least 6 cm      Dental:  Dental Findings: In tact   Chest/Lungs:  Chest/Lungs Findings: Clear to auscultation, Normal Respiratory Rate     Heart/Vascular:  Heart Findings: Rate: Normal  Rhythm: Regular Rhythm  Sounds: Normal        Mental Status:  Mental Status Findings:  Cooperative, Alert and Oriented         Anesthesia Plan  Type of Anesthesia, risks & benefits discussed:  Anesthesia Type:  MAC  Patient's Preference:   Intra-op Monitoring Plan: standard ASA monitors  Intra-op Monitoring Plan Comments:   Post Op Pain Control Plan:   Post Op Pain Control Plan Comments:   Induction:   IV  Beta Blocker:  Patient is not currently on a Beta-Blocker (No  further documentation required).       Informed Consent: Patient understands risks and agrees with Anesthesia plan.  Questions answered. Anesthesia consent signed with patient.  ASA Score: 2     Day of Surgery Review of History & Physical: I have interviewed and examined the patient. I have reviewed the patient's H&P dated:            Ready For Surgery From Anesthesia Perspective.

## 2018-06-29 NOTE — DISCHARGE INSTRUCTIONS
Understanding Colon and Rectal Polyps    The colon (also called the large intestine) is a muscular tube that forms the last part of the digestive tract. It absorbs water and stores food waste. The colon is about 4 to 6 feet long. The rectum is the last 6 inches of the colon. The colon and rectum have a smooth lining composed of millions of cells. Changes in these cells can lead to growths in the colon that can become cancerous and should be removed. Multiple tests are available to screen for colon cancer, but the colonoscopy is the most recommended test. During colonoscopy, these polyps can be removed. How often you need this test depends on many things including your condition, your family history, symptoms, and what the findings were at the previous colonoscopy.   When the colon lining changes  Changes that happen in the cells that line the colon or rectum can lead to growths called polyps. Over a period of years, polyps can turn cancerous. Removing polyps early may prevent cancer from ever forming.  Polyps  Polyps are fleshy clumps of tissue that form on the lining of the colon or rectum. Small polyps are usually benign (not cancerous). However, over time, cells in a polyp can change and become cancerous. Certain types of polyps known as adenomatous polyps are premalignant. The risk for invasive cancer increases with the size of the polyp and certain cell and gene features. This means that they can become cancerous if they're not removed. Hyperplastic polyps are benign. They can grow quite large and not turn cancerous.   Cancer  Almost all colorectal cancers start when polyp cells begin growing abnormally. As a cancerous tumor grows, it may involve more and more of the colon or rectum. In time, cancer can also grow beyond the colon or rectum and spread to nearby organs or to glands called lymph nodes. The cells can also travel to other parts of the body. This is known as metastasis. The earlier a cancerous  tumor is removed, the better the chance of preventing its spread.    Date Last Reviewed: 8/1/2016  © 7029-2539 [employer]. [address] [place] [address], [place] [address]. All rights reserved. This information is not intended as a substitute for professional medical care. Always follow your healthcare professional's instructions.        Hemorrhoids    Hemorrhoids are swollen and inflamed veins inside the rectum and near the anus. The rectum is the last several inches of the colon. The anus is the passage between the rectum and the outside of the body.  Causes  The veins can become swollen due to increased pressure in them. This is most often caused by:  · Chronic constipation or diarrhea  · Straining when having a bowel movement  · Sitting too long on the toilet  · A low-fiber diet  · Pregnancy  Symptoms  · Bleeding from the rectum (this may be noticeable after bowel movements)  · Lump near the anus  · Itching around the anus  · Pain around the anus  There are different types of hemorrhoids. Depending on the type you have and the severity, you may be able to treat yourself at home. In some cases, a procedure may be the best treatment option. Your healthcare provider can tell you more about this, if needed.  Home care  General care  · To get relief from pain or itching, try:  ¨ Topical products. Your healthcare provider may prescribe or recommend creams, ointments, or pads that can be applied to the hemorrhoid. Use these exactly as directed.  ¨ Medicines. Your healthcare provider may recommend stool softeners, suppositories, or laxatives to help manage constipation. Use these exactly as directed.  ¨ Sitz baths. A sitz bath involves sitting in a few inches of warm bath water. Be careful not to make the water so hot that you burn yourself--test it before sitting in it. Soak for about 10 to 15 minutes a few times a day. This may help relieve pain.  Tips to help prevent hemorrhoids  · Eat more fiber. Fiber adds  bulk to stool and absorbs water as it moves through your colon. This makes stool softer and easier to pass.  ¨ Increase the fiber in your diet with more fiber-rich foods. These include fresh fruit, vegetables, and whole grains.  ¨ Take a fiber supplement or bulking agent, if advised to by your provider. These include products such as psyllium or methylcellulose.  · Drink plenty of water, if directed to by your provider. This can help keep stool soft.  · Be more active. Frequent exercise aids digestion and helps prevent constipation. It may also help make bowel movements more regular.  · Dont strain during bowel movements. This can make hemorrhoids more likely. Also, dont sit on the toilet for long periods of time.  Follow-up care  Follow up with your healthcare provider, or as advised. If a culture or imaging tests were done, you will be notified of the results when they are ready. This may take a few days or longer.  When to seek medical advice  Call your healthcare provider right away if any of these occur:  · Increased bleeding from the rectum  · Increased pain around the rectum or anus  · Weakness or dizziness  Call 911  Call 911 or return to the emergency department right away if any of these occur:  · Trouble breathing or swallowing  · Fainting or loss of consciousness  · Unusually fast heart rate  · Vomiting blood  · Large amounts of blood in stool  Date Last Reviewed: 6/22/2015  © 9645-1871 [employer]. [address] [place] [address], [place] [address]. All rights reserved. This information is not intended as a substitute for professional medical care. Always follow your healthcare professional's instructions.

## 2018-06-29 NOTE — ANESTHESIA POSTPROCEDURE EVALUATION
"Anesthesia Post Evaluation    Patient: Jose Long    Procedure(s) Performed: Procedure(s) (LRB):  COLONOSCOPY (N/A)    Final Anesthesia Type: MAC  Patient location during evaluation: GI PACU  Patient participation: Yes- Able to Participate  Level of consciousness: awake and alert and oriented  Post-procedure vital signs: reviewed and stable  Pain management: adequate  Airway patency: patent  PONV status at discharge: No PONV  Anesthetic complications: no      Cardiovascular status: hemodynamically stable and blood pressure returned to baseline  Respiratory status: unassisted, spontaneous ventilation and room air  Hydration status: euvolemic  Follow-up not needed.        Visit Vitals  BP (!) 157/78 (BP Location: Left arm, Patient Position: Lying)   Pulse 75   Temp 36.6 °C (97.8 °F) (Oral)   Resp 18   Ht 5' 10" (1.778 m)   Wt 73.9 kg (163 lb)   SpO2 97%   BMI 23.39 kg/m²       Pain/Jesús Score: Pain Assessment Performed: Yes (6/29/2018  9:58 AM)  Presence of Pain: denies (6/29/2018  9:58 AM)      "

## 2018-06-29 NOTE — TRANSFER OF CARE
"Anesthesia Transfer of Care Note    Patient: Jose Long    Procedure(s) Performed: Procedure(s) (LRB):  COLONOSCOPY (N/A)    Patient location: Other: GI PACU    Anesthesia Type: MAC    Transport from OR: Transported from OR on room air with adequate spontaneous ventilation    Post pain: adequate analgesia    Post assessment: no apparent anesthetic complications    Post vital signs: stable    Level of consciousness: awake    Nausea/Vomiting: no nausea/vomiting    Complications: none    Transfer of care protocol was followed      Last vitals:   Visit Vitals  BP (!) 157/78 (BP Location: Left arm, Patient Position: Lying)   Pulse 75   Temp 36.6 °C (97.8 °F) (Oral)   Resp 18   Ht 5' 10" (1.778 m)   Wt 73.9 kg (163 lb)   SpO2 97%   BMI 23.39 kg/m²     "

## 2018-07-02 VITALS
DIASTOLIC BLOOD PRESSURE: 84 MMHG | RESPIRATION RATE: 18 BRPM | WEIGHT: 163 LBS | HEIGHT: 70 IN | TEMPERATURE: 99 F | BODY MASS INDEX: 23.34 KG/M2 | SYSTOLIC BLOOD PRESSURE: 149 MMHG | HEART RATE: 72 BPM | OXYGEN SATURATION: 100 %

## 2018-07-05 NOTE — PROGRESS NOTES
Dear Harshad Hernandez MD,    I recently cared for Jose Long and performed an endoscopy.  Tissue was sent for pathology evaluation and I will have a letter written to ask the patient to repeat the colonoscopy in 5 years.  The pathology showed that there was adenomatous tissue present.  Thank you for allowing me to participate in the care of your patient.  Please call me for any questions that you might have.      Dr. Kermit Leone  [phone] cell  [phone] office      NURSING STAFF:Please  inform the patient that I reviewed the recent pathology obtained at the time of colonoscopy.    The results showed that there was adenomatous tissue present which is benign and based on that, I recommend that the patient have a repeat colonoscopy performed in 5 years.     If the patient has MyChart, this message has been sent to them.  Confirm that they read the note.  If not, copy the information and print a letter to send to the patient at this time.  Confirm that a notation to the PCP was done.      Dear Jose Long,    This is to inform you that I have reviewed your recent colonoscopy pathology.  The results showed that you had adenomatous tissue present which is benign and based on that, I recommend that you have a repeat colonoscopy performed in 5 years.      Dr. Kermit Leone  [phone]

## 2018-07-09 RX ORDER — AMLODIPINE AND BENAZEPRIL HYDROCHLORIDE 10; 20 MG/1; MG/1
CAPSULE ORAL
Qty: 90 CAPSULE | Refills: 4 | Status: SHIPPED | OUTPATIENT
Start: 2018-07-09 | End: 2019-09-19 | Stop reason: SDUPTHER

## 2018-09-25 ENCOUNTER — IMMUNIZATION (OUTPATIENT)
Dept: FAMILY MEDICINE | Facility: CLINIC | Age: 63
End: 2018-09-25
Payer: COMMERCIAL

## 2018-09-25 PROCEDURE — 90471 IMMUNIZATION ADMIN: CPT | Mod: S$GLB,,, | Performed by: INTERNAL MEDICINE

## 2018-09-25 PROCEDURE — 99999 PR PBB SHADOW E&M-EST. PATIENT-LVL I: CPT | Mod: PBBFAC,,,

## 2018-09-25 PROCEDURE — 90662 IIV NO PRSV INCREASED AG IM: CPT | Mod: S$GLB,,, | Performed by: INTERNAL MEDICINE

## 2018-10-17 ENCOUNTER — OFFICE VISIT (OUTPATIENT)
Dept: DERMATOLOGY | Facility: CLINIC | Age: 63
End: 2018-10-17
Payer: COMMERCIAL

## 2018-10-17 DIAGNOSIS — L82.1 SEBORRHEIC KERATOSIS: Primary | ICD-10-CM

## 2018-10-17 DIAGNOSIS — D18.00 ANGIOMA: ICD-10-CM

## 2018-10-17 DIAGNOSIS — L57.0 ACTINIC KERATOSES: ICD-10-CM

## 2018-10-17 PROCEDURE — 17003 DESTRUCT PREMALG LES 2-14: CPT | Mod: S$GLB,,, | Performed by: DERMATOLOGY

## 2018-10-17 PROCEDURE — 17000 DESTRUCT PREMALG LESION: CPT | Mod: S$GLB,,, | Performed by: DERMATOLOGY

## 2018-10-17 PROCEDURE — 99999 PR PBB SHADOW E&M-EST. PATIENT-LVL III: CPT | Mod: PBBFAC,,, | Performed by: DERMATOLOGY

## 2018-10-17 PROCEDURE — 99213 OFFICE O/P EST LOW 20 MIN: CPT | Mod: 25,S$GLB,, | Performed by: DERMATOLOGY

## 2018-10-17 NOTE — PROGRESS NOTES
Subjective:       Patient ID:  Jose Long is a 62 y.o. male who presents for   Chief Complaint   Patient presents with    Skin Check     TBSE    Spot     c/o spots to face x several yrs     Hx of AK's, last seen on 5/4/16.  He c/o scaly lesion of the left cheek for several months.  No tx tried.     The patient denies personal history of skin cancer. Father c/ possible hx of skin CA.           Review of Systems   Constitutional: Negative for fever and chills.   Gastrointestinal: Negative for nausea and vomiting.   Skin: Negative for daily sunscreen use, activity-related sunscreen use and recent sunburn.   Hematologic/Lymphatic: Does not bruise/bleed easily.        Objective:    Physical Exam   Constitutional: He appears well-developed and well-nourished. No distress.   Neurological: He is alert and oriented to person, place, and time. He is not disoriented.   Psychiatric: He has a normal mood and affect.   Skin:   Areas Examined (abnormalities noted in diagram):   Scalp / Hair Palpated and Inspected  Head / Face Inspection Performed  Neck Inspection Performed  Chest / Axilla Inspection Performed  Abdomen Inspection Performed  Back Inspection Performed  RUE Inspected  LUE Inspection Performed  RLE Inspected  LLE Inspection Performed  Nails and Digits Inspection Performed                   Diagram Legend     Erythematous scaling macule/papule c/w actinic keratosis       Vascular papule c/w angioma      Pigmented verrucoid papule/plaque c/w seborrheic keratosis      Yellow umbilicated papule c/w sebaceous hyperplasia      Irregularly shaped tan macule c/w lentigo     1-2 mm smooth white papules consistent with Milia      Movable subcutaneous cyst with punctum c/w epidermal inclusion cyst      Subcutaneous movable cyst c/w pilar cyst      Firm pink to brown papule c/w dermatofibroma      Pedunculated fleshy papule(s) c/w skin tag(s)      Evenly pigmented macule c/w junctional nevus     Mildly variegated pigmented,  slightly irregular-bordered macule c/w mildly atypical nevus      Flesh colored to evenly pigmented papule c/w intradermal nevus       Pink pearly papule/plaque c/w basal cell carcinoma      Erythematous hyperkeratotic cursted plaque c/w SCC      Surgical scar with no sign of skin cancer recurrence      Open and closed comedones      Inflammatory papules and pustules      Verrucoid papule consistent consistent with wart     Erythematous eczematous patches and plaques     Dystrophic onycholytic nail with subungual debris c/w onychomycosis     Umbilicated papule    Erythematous-base heme-crusted tan verrucoid plaque consistent with inflamed seborrheic keratosis     Erythematous Silvery Scaling Plaque c/w Psoriasis     See annotation      Assessment / Plan:        Seborrheic keratosis  Reassurance given. Discussed diagnosis and that lesions are benign.  AAD handout given.     Angioma  Reassurance given.  Lesions are benign.    Actinic keratoses  Cryosurgery Procedure Note    The patient is informed of the precancerous quality and need for treatment of these lesions. After risks, benefits and alternatives explained, including blistering, pain, hyper- and hypopigmentation, patient verbally consents to cryotherapy to precancerous lesions. Liquid nitrogen cryosurgery is applied to the 4 actinic keratoses, as detailed in the physical exam, to produce a freeze injury. The patient is aware that blisters may form and is instructed on wound care with gentle cleansing and use of vaseline ointment to keep moist until healed. The patient is supplied a handout on cryosurgery and is instructed to call if lesions do not completely resolve.             Follow-up in about 1 year (around 10/17/2019).

## 2018-10-17 NOTE — PATIENT INSTRUCTIONS

## 2019-05-24 ENCOUNTER — OFFICE VISIT (OUTPATIENT)
Dept: FAMILY MEDICINE | Facility: CLINIC | Age: 64
End: 2019-05-24
Payer: COMMERCIAL

## 2019-05-24 ENCOUNTER — LAB VISIT (OUTPATIENT)
Dept: LAB | Facility: HOSPITAL | Age: 64
End: 2019-05-24
Payer: COMMERCIAL

## 2019-05-24 ENCOUNTER — TELEPHONE (OUTPATIENT)
Dept: RADIOLOGY | Facility: HOSPITAL | Age: 64
End: 2019-05-24

## 2019-05-24 VITALS
DIASTOLIC BLOOD PRESSURE: 80 MMHG | WEIGHT: 166.88 LBS | SYSTOLIC BLOOD PRESSURE: 161 MMHG | OXYGEN SATURATION: 98 % | HEART RATE: 59 BPM | TEMPERATURE: 98 F | BODY MASS INDEX: 23.95 KG/M2

## 2019-05-24 DIAGNOSIS — E78.5 DYSLIPIDEMIA: ICD-10-CM

## 2019-05-24 DIAGNOSIS — I10 ESSENTIAL HYPERTENSION: ICD-10-CM

## 2019-05-24 DIAGNOSIS — Z00.00 ROUTINE ADULT HEALTH MAINTENANCE: ICD-10-CM

## 2019-05-24 DIAGNOSIS — I10 ESSENTIAL HYPERTENSION: Primary | ICD-10-CM

## 2019-05-24 DIAGNOSIS — R00.2 PALPITATIONS: ICD-10-CM

## 2019-05-24 LAB
ALBUMIN SERPL BCP-MCNC: 4.4 G/DL (ref 3.5–5.2)
ALP SERPL-CCNC: 72 U/L (ref 55–135)
ALT SERPL W/O P-5'-P-CCNC: 18 U/L (ref 10–44)
ANION GAP SERPL CALC-SCNC: 10 MMOL/L (ref 8–16)
AST SERPL-CCNC: 28 U/L (ref 10–40)
BASOPHILS # BLD AUTO: 0.02 K/UL (ref 0–0.2)
BASOPHILS NFR BLD: 0.3 % (ref 0–1.9)
BILIRUB SERPL-MCNC: 0.7 MG/DL (ref 0.1–1)
BUN SERPL-MCNC: 10 MG/DL (ref 8–23)
CALCIUM SERPL-MCNC: 9.8 MG/DL (ref 8.7–10.5)
CHLORIDE SERPL-SCNC: 104 MMOL/L (ref 95–110)
CHOLEST SERPL-MCNC: 187 MG/DL (ref 120–199)
CHOLEST/HDLC SERPL: 3.4 {RATIO} (ref 2–5)
CO2 SERPL-SCNC: 29 MMOL/L (ref 23–29)
COMPLEXED PSA SERPL-MCNC: 4 NG/ML (ref 0–4)
CREAT SERPL-MCNC: 1.2 MG/DL (ref 0.5–1.4)
DIFFERENTIAL METHOD: NORMAL
EOSINOPHIL # BLD AUTO: 0.1 K/UL (ref 0–0.5)
EOSINOPHIL NFR BLD: 1 % (ref 0–8)
ERYTHROCYTE [DISTWIDTH] IN BLOOD BY AUTOMATED COUNT: 12.1 % (ref 11.5–14.5)
EST. GFR  (AFRICAN AMERICAN): >60 ML/MIN/1.73 M^2
EST. GFR  (NON AFRICAN AMERICAN): >60 ML/MIN/1.73 M^2
GLUCOSE SERPL-MCNC: 80 MG/DL (ref 70–110)
HCT VFR BLD AUTO: 46.2 % (ref 40–54)
HDLC SERPL-MCNC: 55 MG/DL (ref 40–75)
HDLC SERPL: 29.4 % (ref 20–50)
HGB BLD-MCNC: 15.4 G/DL (ref 14–18)
IMM GRANULOCYTES # BLD AUTO: 0.01 K/UL (ref 0–0.04)
IMM GRANULOCYTES NFR BLD AUTO: 0.2 % (ref 0–0.5)
LDLC SERPL CALC-MCNC: 119.4 MG/DL (ref 63–159)
LYMPHOCYTES # BLD AUTO: 1.2 K/UL (ref 1–4.8)
LYMPHOCYTES NFR BLD: 21.5 % (ref 18–48)
MCH RBC QN AUTO: 29.7 PG (ref 27–31)
MCHC RBC AUTO-ENTMCNC: 33.3 G/DL (ref 32–36)
MCV RBC AUTO: 89 FL (ref 82–98)
MONOCYTES # BLD AUTO: 0.4 K/UL (ref 0.3–1)
MONOCYTES NFR BLD: 6.9 % (ref 4–15)
NEUTROPHILS # BLD AUTO: 4.1 K/UL (ref 1.8–7.7)
NEUTROPHILS NFR BLD: 70.1 % (ref 38–73)
NONHDLC SERPL-MCNC: 132 MG/DL
NRBC BLD-RTO: 0 /100 WBC
PLATELET # BLD AUTO: 235 K/UL (ref 150–350)
PMV BLD AUTO: 11.5 FL (ref 9.2–12.9)
POTASSIUM SERPL-SCNC: 3.8 MMOL/L (ref 3.5–5.1)
PROT SERPL-MCNC: 7.5 G/DL (ref 6–8.4)
RBC # BLD AUTO: 5.19 M/UL (ref 4.6–6.2)
SODIUM SERPL-SCNC: 143 MMOL/L (ref 136–145)
TRIGL SERPL-MCNC: 63 MG/DL (ref 30–150)
TSH SERPL DL<=0.005 MIU/L-ACNC: 0.85 UIU/ML (ref 0.4–4)
WBC # BLD AUTO: 5.78 K/UL (ref 3.9–12.7)

## 2019-05-24 PROCEDURE — 84153 ASSAY OF PSA TOTAL: CPT

## 2019-05-24 PROCEDURE — 3079F PR MOST RECENT DIASTOLIC BLOOD PRESSURE 80-89 MM HG: ICD-10-PCS | Mod: CPTII,S$GLB,, | Performed by: INTERNAL MEDICINE

## 2019-05-24 PROCEDURE — 1101F PT FALLS ASSESS-DOCD LE1/YR: CPT | Mod: CPTII,S$GLB,, | Performed by: INTERNAL MEDICINE

## 2019-05-24 PROCEDURE — 1101F PR PT FALLS ASSESS DOC 0-1 FALLS W/OUT INJ PAST YR: ICD-10-PCS | Mod: CPTII,S$GLB,, | Performed by: INTERNAL MEDICINE

## 2019-05-24 PROCEDURE — 99214 PR OFFICE/OUTPT VISIT, EST, LEVL IV, 30-39 MIN: ICD-10-PCS | Mod: S$GLB,,, | Performed by: INTERNAL MEDICINE

## 2019-05-24 PROCEDURE — 80061 LIPID PANEL: CPT

## 2019-05-24 PROCEDURE — 99999 PR PBB SHADOW E&M-EST. PATIENT-LVL III: ICD-10-PCS | Mod: PBBFAC,,, | Performed by: INTERNAL MEDICINE

## 2019-05-24 PROCEDURE — 99999 PR PBB SHADOW E&M-EST. PATIENT-LVL III: CPT | Mod: PBBFAC,,, | Performed by: INTERNAL MEDICINE

## 2019-05-24 PROCEDURE — 3079F DIAST BP 80-89 MM HG: CPT | Mod: CPTII,S$GLB,, | Performed by: INTERNAL MEDICINE

## 2019-05-24 PROCEDURE — 85025 COMPLETE CBC W/AUTO DIFF WBC: CPT

## 2019-05-24 PROCEDURE — 3077F PR MOST RECENT SYSTOLIC BLOOD PRESSURE >= 140 MM HG: ICD-10-PCS | Mod: CPTII,S$GLB,, | Performed by: INTERNAL MEDICINE

## 2019-05-24 PROCEDURE — 3008F PR BODY MASS INDEX (BMI) DOCUMENTED: ICD-10-PCS | Mod: CPTII,S$GLB,, | Performed by: INTERNAL MEDICINE

## 2019-05-24 PROCEDURE — 99214 OFFICE O/P EST MOD 30 MIN: CPT | Mod: S$GLB,,, | Performed by: INTERNAL MEDICINE

## 2019-05-24 PROCEDURE — 80053 COMPREHEN METABOLIC PANEL: CPT

## 2019-05-24 PROCEDURE — 3008F BODY MASS INDEX DOCD: CPT | Mod: CPTII,S$GLB,, | Performed by: INTERNAL MEDICINE

## 2019-05-24 PROCEDURE — 36415 COLL VENOUS BLD VENIPUNCTURE: CPT | Mod: PO

## 2019-05-24 PROCEDURE — 84443 ASSAY THYROID STIM HORMONE: CPT

## 2019-05-24 PROCEDURE — 3077F SYST BP >= 140 MM HG: CPT | Mod: CPTII,S$GLB,, | Performed by: INTERNAL MEDICINE

## 2019-05-24 RX ORDER — NEBIVOLOL 2.5 MG/1
2.5 TABLET ORAL DAILY
Qty: 30 TABLET | Refills: 3 | Status: SHIPPED | OUTPATIENT
Start: 2019-05-24 | End: 2019-06-24

## 2019-05-24 NOTE — PROGRESS NOTES
Subjective:       Patient ID: Jose Long is a 63 y.o. male.    Chief Complaint: Annual Exam; Hypertension; and Hyperlipidemia    Hypertension   Associated symptoms include palpitations. Pertinent negatives include no chest pain, headaches, neck pain or shortness of breath.   Hyperlipidemia   Pertinent negatives include no chest pain, myalgias or shortness of breath.     Past Medical History:   Diagnosis Date    Dyslipidemia     Hypertension     Multiple allergies      Past Surgical History:   Procedure Laterality Date    COLONOSCOPY N/A 6/29/2018    Performed by [facility]it [facility] at [facility]    COLONOSCOPY N/A 2/10/2015    Performed by Wade Pimentel MD at [facility] ENDO     Family History   Problem Relation Age of Onset    Cataracts Mother     Hypertension Mother     Alzheimer's disease Mother     Diabetes Maternal Aunt     Macular degeneration Maternal Aunt     Diabetes Paternal Grandmother     Colon cancer Father     Melanoma Neg Hx      Social History     Socioeconomic History    Marital status: Single     Spouse name: Not on file    Number of children: Not on file    Years of education: Not on file    Highest education level: Not on file   Occupational History    Not on file   Social Needs    Financial resource strain: Not on file    Food insecurity:     Worry: Not on file     Inability: Not on file    Transportation needs:     Medical: Not on file     Non-medical: Not on file   Tobacco Use    Smoking status: Former Smoker    Smokeless tobacco: Never Used   Substance and Sexual Activity    Alcohol use: No    Drug use: No    Sexual activity: Not on file   Lifestyle    Physical activity:     Days per week: Not on file     Minutes per session: Not on file    Stress: Not on file   Relationships    Social connections:     Talks on phone: Not on file     Gets together: Not on file     Attends Confucianist service: Not on file     Active member of club or organization: Not on file      Attends meetings of clubs or organizations: Not on file     Relationship status: Not on file   Other Topics Concern    Not on file   Social History Narrative    Not on file     Review of Systems   Constitutional: Negative for activity change, appetite change, chills, diaphoresis, fatigue, fever and unexpected weight change.   HENT: Negative for drooling, ear discharge, ear pain, facial swelling, hearing loss, mouth sores, nosebleeds, postnasal drip, rhinorrhea, sinus pressure, sneezing, sore throat, tinnitus, trouble swallowing and voice change.    Eyes: Negative for photophobia, discharge, redness and visual disturbance.   Respiratory: Negative for apnea, cough, choking, chest tightness, shortness of breath and wheezing.    Cardiovascular: Positive for palpitations. Negative for chest pain and leg swelling.   Gastrointestinal: Negative for abdominal distention, abdominal pain, anal bleeding, blood in stool, constipation, diarrhea, nausea and vomiting.   Endocrine: Negative for cold intolerance, heat intolerance, polydipsia, polyphagia and polyuria.   Genitourinary: Negative for difficulty urinating, dysuria, enuresis, flank pain, frequency, genital sores, hematuria and urgency.   Musculoskeletal: Negative for arthralgias, back pain, gait problem, joint swelling, myalgias, neck pain and neck stiffness.   Skin: Negative for color change, pallor, rash and wound.   Allergic/Immunologic: Negative for food allergies and immunocompromised state.   Neurological: Negative for dizziness, tremors, seizures, syncope, facial asymmetry, speech difficulty, weakness, light-headedness, numbness and headaches.   Hematological: Negative for adenopathy. Does not bruise/bleed easily.   Psychiatric/Behavioral: Negative for agitation, behavioral problems, confusion, decreased concentration, dysphoric mood, hallucinations, self-injury, sleep disturbance and suicidal ideas. The patient is not nervous/anxious and is not hyperactive.         Objective:      Physical Exam   Constitutional: He is oriented to person, place, and time. He appears well-developed and well-nourished. No distress.   HENT:   Head: Normocephalic and atraumatic.   Eyes: Pupils are equal, round, and reactive to light.   Neck: Normal range of motion. Neck supple. No JVD present. Carotid bruit is not present. No tracheal deviation present. No thyromegaly present.   Cardiovascular: Normal rate, regular rhythm, normal heart sounds and intact distal pulses.   Pulmonary/Chest: Effort normal and breath sounds normal. No respiratory distress. He has no wheezes. He has no rales. He exhibits no tenderness.   Abdominal: Soft. Bowel sounds are normal. He exhibits no distension. There is no tenderness. There is no rebound and no guarding.   Musculoskeletal: Normal range of motion. He exhibits no edema or tenderness.   Lymphadenopathy:     He has no cervical adenopathy.   Neurological: He is alert and oriented to person, place, and time. No cranial nerve deficit. Coordination normal.   Skin: Skin is warm and dry. No rash noted. He is not diaphoretic. No erythema. No pallor.   Psychiatric: He has a normal mood and affect. His behavior is normal. Judgment and thought content normal.   Nursing note and vitals reviewed.      CMP  Sodium   Date Value Ref Range Status   05/25/2018 144 136 - 145 mmol/L Final     Potassium   Date Value Ref Range Status   05/25/2018 3.8 3.5 - 5.1 mmol/L Final     Chloride   Date Value Ref Range Status   05/25/2018 105 95 - 110 mmol/L Final     CO2   Date Value Ref Range Status   05/25/2018 27 23 - 29 mmol/L Final     Glucose   Date Value Ref Range Status   05/25/2018 88 70 - 110 mg/dL Final     BUN, Bld   Date Value Ref Range Status   05/25/2018 14 8 - 23 mg/dL Final     Creatinine   Date Value Ref Range Status   05/25/2018 1.4 0.5 - 1.4 mg/dL Final     Calcium   Date Value Ref Range Status   05/25/2018 9.5 8.7 - 10.5 mg/dL Final     Total Protein   Date Value Ref Range  Status   05/25/2018 7.3 6.0 - 8.4 g/dL Final     Albumin   Date Value Ref Range Status   05/25/2018 4.2 3.5 - 5.2 g/dL Final     Total Bilirubin   Date Value Ref Range Status   05/25/2018 0.6 0.1 - 1.0 mg/dL Final     Comment:     For infants and newborns, interpretation of results should be based  on gestational age, weight and in agreement with clinical  observations.  Premature Infant recommended reference ranges:  Up to 24 hours.............<8.0 mg/dL  Up to 48 hours............<12.0 mg/dL  3-5 days..................<15.0 mg/dL  6-29 days.................<15.0 mg/dL       Alkaline Phosphatase   Date Value Ref Range Status   05/25/2018 73 55 - 135 U/L Final     AST   Date Value Ref Range Status   05/25/2018 23 10 - 40 U/L Final     ALT   Date Value Ref Range Status   05/25/2018 21 10 - 44 U/L Final     Anion Gap   Date Value Ref Range Status   05/25/2018 12 8 - 16 mmol/L Final     eGFR if    Date Value Ref Range Status   05/25/2018 >60.0 >60 mL/min/1.73 m^2 Final     eGFR if non    Date Value Ref Range Status   05/25/2018 52.7 (A) >60 mL/min/1.73 m^2 Final     Comment:     Calculation used to obtain the estimated glomerular filtration  rate (eGFR) is the CKD-EPI equation.        Lab Results   Component Value Date    WBC 5.64 05/25/2018    HGB 14.8 05/25/2018    HCT 44.0 05/25/2018    MCV 89 05/25/2018     05/25/2018     Lab Results   Component Value Date    CHOL 181 05/25/2018     Lab Results   Component Value Date    HDL 46 05/25/2018     Lab Results   Component Value Date    LDLCALC 120.8 05/25/2018     Lab Results   Component Value Date    TRIG 71 05/25/2018     Lab Results   Component Value Date    CHOLHDL 25.4 05/25/2018     Lab Results   Component Value Date    TSH 1.198 01/16/2015     No results found for: LABA1C, HGBA1C  Assessment:       1. Essential hypertension    2. Dyslipidemia    3. Routine adult health maintenance    4. Palpitations        Plan:   Essential  hypertension-----------------add bystolic------------follow.  -     Comprehensive metabolic panel; Future; Expected date: 05/24/2019  -     CBC auto differential; Future; Expected date: 05/24/2019  -     TSH; Future; Expected date: 05/24/2019  -     nebivolol (BYSTOLIC) 2.5 MG Tab; Take 1 tablet (2.5 mg total) by mouth once daily.  Dispense: 30 tablet; Refill: 3    Dyslipidemia  -     Lipid panel; Future; Expected date: 05/24/2019    Routine adult health maintenance  -     PSA, Screening; Future; Expected date: 05/24/2019  -     US Abdominal Aorta; Future; Expected date: 05/24/2019    Palpitations  -     Holter monitor - 24 hour; Future; Expected date: 05/24/2019  -     2D echo with color flow doppler; Future    F/u 1 month

## 2019-05-26 ENCOUNTER — TELEPHONE (OUTPATIENT)
Dept: FAMILY MEDICINE | Facility: CLINIC | Age: 64
End: 2019-05-26

## 2019-05-26 DIAGNOSIS — R97.20 PSA ELEVATION: Primary | ICD-10-CM

## 2019-05-27 ENCOUNTER — HOSPITAL ENCOUNTER (OUTPATIENT)
Dept: RADIOLOGY | Facility: HOSPITAL | Age: 64
Discharge: HOME | End: 2019-05-27
Attending: INTERNAL MEDICINE
Payer: COMMERCIAL

## 2019-05-27 DIAGNOSIS — Z00.00 ROUTINE ADULT HEALTH MAINTENANCE: ICD-10-CM

## 2019-05-27 PROCEDURE — 76775 US ABDOMINAL AORTA: ICD-10-PCS | Mod: 26,,, | Performed by: RADIOLOGY

## 2019-05-27 PROCEDURE — 76775 US EXAM ABDO BACK WALL LIM: CPT | Mod: 26,,, | Performed by: RADIOLOGY

## 2019-05-27 PROCEDURE — 76775 US EXAM ABDO BACK WALL LIM: CPT | Mod: TC

## 2019-06-03 ENCOUNTER — CLINICAL SUPPORT (OUTPATIENT)
Dept: CARDIOLOGY | Facility: CLINIC | Age: 64
End: 2019-06-03
Attending: INTERNAL MEDICINE
Payer: COMMERCIAL

## 2019-06-03 DIAGNOSIS — R00.2 PALPITATIONS: ICD-10-CM

## 2019-06-03 PROCEDURE — 93224 HOLTER MONITOR - 24 HOUR: ICD-10-PCS | Mod: S$GLB,,, | Performed by: INTERNAL MEDICINE

## 2019-06-03 PROCEDURE — 93224 XTRNL ECG REC UP TO 48 HRS: CPT | Mod: S$GLB,,, | Performed by: INTERNAL MEDICINE

## 2019-06-12 ENCOUNTER — CLINICAL SUPPORT (OUTPATIENT)
Dept: CARDIOLOGY | Facility: CLINIC | Age: 64
End: 2019-06-12
Attending: INTERNAL MEDICINE
Payer: COMMERCIAL

## 2019-06-12 DIAGNOSIS — R00.2 PALPITATIONS: ICD-10-CM

## 2019-06-12 LAB
DIASTOLIC DYSFUNCTION: NO
ESTIMATED PA SYSTOLIC PRESSURE: 37.45
RETIRED EF AND QEF - SEE NOTES: 55 (ref 55–65)

## 2019-06-12 PROCEDURE — 93306 2D ECHO WITH COLOR FLOW DOPPLER: ICD-10-PCS | Mod: S$GLB,,, | Performed by: NUCLEAR MEDICINE

## 2019-06-12 PROCEDURE — 93306 TTE W/DOPPLER COMPLETE: CPT | Mod: S$GLB,,, | Performed by: NUCLEAR MEDICINE

## 2019-06-24 ENCOUNTER — OFFICE VISIT (OUTPATIENT)
Dept: FAMILY MEDICINE | Facility: CLINIC | Age: 64
End: 2019-06-24
Payer: COMMERCIAL

## 2019-06-24 VITALS
HEART RATE: 54 BPM | OXYGEN SATURATION: 97 % | SYSTOLIC BLOOD PRESSURE: 166 MMHG | HEIGHT: 70 IN | BODY MASS INDEX: 23.42 KG/M2 | TEMPERATURE: 98 F | DIASTOLIC BLOOD PRESSURE: 78 MMHG | WEIGHT: 163.56 LBS

## 2019-06-24 DIAGNOSIS — R00.1 BRADYCARDIA: ICD-10-CM

## 2019-06-24 DIAGNOSIS — I10 ESSENTIAL HYPERTENSION: Primary | ICD-10-CM

## 2019-06-24 DIAGNOSIS — E78.5 DYSLIPIDEMIA: ICD-10-CM

## 2019-06-24 PROCEDURE — 99999 PR PBB SHADOW E&M-EST. PATIENT-LVL IV: CPT | Mod: PBBFAC,,, | Performed by: INTERNAL MEDICINE

## 2019-06-24 PROCEDURE — 3077F PR MOST RECENT SYSTOLIC BLOOD PRESSURE >= 140 MM HG: ICD-10-PCS | Mod: CPTII,S$GLB,, | Performed by: INTERNAL MEDICINE

## 2019-06-24 PROCEDURE — 99214 PR OFFICE/OUTPT VISIT, EST, LEVL IV, 30-39 MIN: ICD-10-PCS | Mod: S$GLB,,, | Performed by: INTERNAL MEDICINE

## 2019-06-24 PROCEDURE — 99999 PR PBB SHADOW E&M-EST. PATIENT-LVL IV: ICD-10-PCS | Mod: PBBFAC,,, | Performed by: INTERNAL MEDICINE

## 2019-06-24 PROCEDURE — 3008F BODY MASS INDEX DOCD: CPT | Mod: CPTII,S$GLB,, | Performed by: INTERNAL MEDICINE

## 2019-06-24 PROCEDURE — 99214 OFFICE O/P EST MOD 30 MIN: CPT | Mod: S$GLB,,, | Performed by: INTERNAL MEDICINE

## 2019-06-24 PROCEDURE — 3077F SYST BP >= 140 MM HG: CPT | Mod: CPTII,S$GLB,, | Performed by: INTERNAL MEDICINE

## 2019-06-24 PROCEDURE — 1101F PR PT FALLS ASSESS DOC 0-1 FALLS W/OUT INJ PAST YR: ICD-10-PCS | Mod: CPTII,S$GLB,, | Performed by: INTERNAL MEDICINE

## 2019-06-24 PROCEDURE — 3078F DIAST BP <80 MM HG: CPT | Mod: CPTII,S$GLB,, | Performed by: INTERNAL MEDICINE

## 2019-06-24 PROCEDURE — 3008F PR BODY MASS INDEX (BMI) DOCUMENTED: ICD-10-PCS | Mod: CPTII,S$GLB,, | Performed by: INTERNAL MEDICINE

## 2019-06-24 PROCEDURE — 3078F PR MOST RECENT DIASTOLIC BLOOD PRESSURE < 80 MM HG: ICD-10-PCS | Mod: CPTII,S$GLB,, | Performed by: INTERNAL MEDICINE

## 2019-06-24 PROCEDURE — 1101F PT FALLS ASSESS-DOCD LE1/YR: CPT | Mod: CPTII,S$GLB,, | Performed by: INTERNAL MEDICINE

## 2019-06-24 NOTE — PROGRESS NOTES
Subjective:       Patient ID: Jose Long is a 63 y.o. male.    Chief Complaint: Follow-up; Hypertension; Hyperlipidemia; and Bradycardia    Hypertension   Pertinent negatives include no chest pain, headaches, neck pain, palpitations or shortness of breath.   Hyperlipidemia   Pertinent negatives include no chest pain, myalgias or shortness of breath.     Past Medical History:   Diagnosis Date    Dyslipidemia     Hypertension     Multiple allergies      Past Surgical History:   Procedure Laterality Date    COLONOSCOPY N/A 6/29/2018    [facility]    COLONOSCOPY N/A 2/10/2015    Performed by Wade Pimentel MD at [facility] END[facility]     Family History   Problem Relation Age of Onset    Cataracts Mother     Hypertension Mother     Alzheimer's disease Mother     Diabetes Maternal Aunt     Macular degeneration Maternal Aunt     Diabetes Paternal Grandmother     Colon cancer Father     Melanoma Neg Hx      Social History     Socioeconomic History    Marital status: Single     Spouse name: Not on file    Number of children: Not on file    Years of education: Not on file    Highest education level: Not on file   Occupational History    Not on file   Social Needs    Financial resource strain: Not hard at all    Food insecurity:     Worry: Never true     Inability: Never true    Transportation needs:     Medical: No     Non-medical: Not on file   Tobacco Use    Smoking status: Former Smoker    Smokeless tobacco: Never Used   Substance and Sexual Activity    Alcohol use: No     Frequency: Never     Binge frequency: Never    Drug use: No    Sexual activity: Not on file   Lifestyle    Physical activity:     Days per week: 2 days     Minutes per session: 90 min    Stress: Not at all   Relationships    Social connections:     Talks on phone: More than three times a week     Gets together: More than three times a week     Attends Jehovah's witness service: Not on file     Active  member of club or organization: No     Attends meetings of clubs or organizations: Never     Relationship status: Never    Other Topics Concern    Not on file   Social History Narrative    Not on file     Review of Systems   Constitutional: Negative for activity change, appetite change, chills, diaphoresis, fatigue, fever and unexpected weight change.   HENT: Negative for drooling, ear discharge, ear pain, facial swelling, hearing loss, mouth sores, nosebleeds, postnasal drip, rhinorrhea, sinus pressure, sneezing, sore throat, tinnitus, trouble swallowing and voice change.    Eyes: Negative for photophobia, discharge, redness and visual disturbance.   Respiratory: Negative for apnea, cough, choking, chest tightness, shortness of breath and wheezing.    Cardiovascular: Negative for chest pain, palpitations and leg swelling.   Gastrointestinal: Negative for abdominal distention, abdominal pain, anal bleeding, blood in stool, constipation, diarrhea, nausea and vomiting.   Endocrine: Negative for cold intolerance, heat intolerance, polydipsia, polyphagia and polyuria.   Genitourinary: Negative for difficulty urinating, dysuria, enuresis, flank pain, frequency, genital sores, hematuria and urgency.   Musculoskeletal: Negative for arthralgias, back pain, gait problem, joint swelling, myalgias, neck pain and neck stiffness.   Skin: Negative for color change, pallor, rash and wound.   Allergic/Immunologic: Negative for food allergies and immunocompromised state.   Neurological: Negative for dizziness, tremors, seizures, syncope, facial asymmetry, speech difficulty, weakness, light-headedness, numbness and headaches.   Hematological: Negative for adenopathy. Does not bruise/bleed easily.   Psychiatric/Behavioral: Negative for agitation, behavioral problems, confusion, decreased concentration, dysphoric mood, hallucinations, self-injury, sleep disturbance and suicidal ideas. The patient is not nervous/anxious and is  not hyperactive.        Objective:      Physical Exam   Constitutional: He is oriented to person, place, and time. He appears well-developed and well-nourished. No distress.   HENT:   Head: Normocephalic and atraumatic.   Eyes: Pupils are equal, round, and reactive to light.   Neck: Normal range of motion. Neck supple. No JVD present. Carotid bruit is not present. No tracheal deviation present. No thyromegaly present.   Cardiovascular: Normal rate, regular rhythm, normal heart sounds and intact distal pulses.   Pulmonary/Chest: Effort normal and breath sounds normal. No respiratory distress. He has no wheezes. He has no rales. He exhibits no tenderness.   Abdominal: Soft. Bowel sounds are normal. He exhibits no distension. There is no tenderness. There is no rebound and no guarding.   Musculoskeletal: Normal range of motion. He exhibits no edema or tenderness.   Lymphadenopathy:     He has no cervical adenopathy.   Neurological: He is alert and oriented to person, place, and time.   Skin: Skin is warm and dry. No rash noted. He is not diaphoretic. No erythema. No pallor.   Psychiatric: He has a normal mood and affect. His behavior is normal. Judgment and thought content normal.   Nursing note and vitals reviewed.      CMP  Sodium   Date Value Ref Range Status   05/24/2019 143 136 - 145 mmol/L Final     Potassium   Date Value Ref Range Status   05/24/2019 3.8 3.5 - 5.1 mmol/L Final     Chloride   Date Value Ref Range Status   05/24/2019 104 95 - 110 mmol/L Final     CO2   Date Value Ref Range Status   05/24/2019 29 23 - 29 mmol/L Final     Glucose   Date Value Ref Range Status   05/24/2019 80 70 - 110 mg/dL Final     BUN, Bld   Date Value Ref Range Status   05/24/2019 10 8 - 23 mg/dL Final     Creatinine   Date Value Ref Range Status   05/24/2019 1.2 0.5 - 1.4 mg/dL Final     Calcium   Date Value Ref Range Status   05/24/2019 9.8 8.7 - 10.5 mg/dL Final     Total Protein   Date Value Ref Range Status   05/24/2019 7.5  6.0 - 8.4 g/dL Final     Albumin   Date Value Ref Range Status   05/24/2019 4.4 3.5 - 5.2 g/dL Final     Total Bilirubin   Date Value Ref Range Status   05/24/2019 0.7 0.1 - 1.0 mg/dL Final     Comment:     For infants and newborns, interpretation of results should be based  on gestational age, weight and in agreement with clinical  observations.  Premature Infant recommended reference ranges:  Up to 24 hours.............<8.0 mg/dL  Up to 48 hours............<12.0 mg/dL  3-5 days..................<15.0 mg/dL  6-29 days.................<15.0 mg/dL       Alkaline Phosphatase   Date Value Ref Range Status   05/24/2019 72 55 - 135 U/L Final     AST   Date Value Ref Range Status   05/24/2019 28 10 - 40 U/L Final     ALT   Date Value Ref Range Status   05/24/2019 18 10 - 44 U/L Final     Anion Gap   Date Value Ref Range Status   05/24/2019 10 8 - 16 mmol/L Final     eGFR if    Date Value Ref Range Status   05/24/2019 >60.0 >60 mL/min/1.73 m^2 Final     eGFR if non    Date Value Ref Range Status   05/24/2019 >60.0 >60 mL/min/1.73 m^2 Final     Comment:     Calculation used to obtain the estimated glomerular filtration  rate (eGFR) is the CKD-EPI equation.        Lab Results   Component Value Date    WBC 5.78 05/24/2019    HGB 15.4 05/24/2019    HCT 46.2 05/24/2019    MCV 89 05/24/2019     05/24/2019     Lab Results   Component Value Date    CHOL 187 05/24/2019     Lab Results   Component Value Date    HDL 55 05/24/2019     Lab Results   Component Value Date    LDLCALC 119.4 05/24/2019     Lab Results   Component Value Date    TRIG 63 05/24/2019     Lab Results   Component Value Date    CHOLHDL 29.4 05/24/2019     Lab Results   Component Value Date    TSH 0.847 05/24/2019     No results found for: LABA1C, HGBA1C  Assessment:       1. Essential hypertension    2. Dyslipidemia    3. Bradycardia        Plan:   Essential hypertension------------------------continue  lotrel-------follow-----    Dyslipidemia    Bradycardia  -     Ambulatory referral to Cardiology    F/u 3 months.

## 2019-06-26 ENCOUNTER — CLINICAL SUPPORT (OUTPATIENT)
Dept: CARDIOLOGY | Facility: CLINIC | Age: 64
End: 2019-06-26
Payer: COMMERCIAL

## 2019-06-26 ENCOUNTER — OFFICE VISIT (OUTPATIENT)
Dept: CARDIOLOGY | Facility: CLINIC | Age: 64
End: 2019-06-26
Payer: COMMERCIAL

## 2019-06-26 VITALS
SYSTOLIC BLOOD PRESSURE: 156 MMHG | HEIGHT: 70 IN | BODY MASS INDEX: 23.34 KG/M2 | HEART RATE: 61 BPM | WEIGHT: 163 LBS | DIASTOLIC BLOOD PRESSURE: 80 MMHG

## 2019-06-26 DIAGNOSIS — R00.1 BRADYCARDIA: ICD-10-CM

## 2019-06-26 DIAGNOSIS — I10 ESSENTIAL HYPERTENSION: Primary | Chronic | ICD-10-CM

## 2019-06-26 DIAGNOSIS — I10 ESSENTIAL HYPERTENSION: ICD-10-CM

## 2019-06-26 DIAGNOSIS — I10 ESSENTIAL HYPERTENSION: Primary | ICD-10-CM

## 2019-06-26 DIAGNOSIS — E78.5 DYSLIPIDEMIA: ICD-10-CM

## 2019-06-26 PROCEDURE — 93000 EKG 12-LEAD: ICD-10-PCS | Mod: S$GLB,,, | Performed by: INTERNAL MEDICINE

## 2019-06-26 PROCEDURE — 1101F PT FALLS ASSESS-DOCD LE1/YR: CPT | Mod: CPTII,S$GLB,, | Performed by: INTERNAL MEDICINE

## 2019-06-26 PROCEDURE — 3077F PR MOST RECENT SYSTOLIC BLOOD PRESSURE >= 140 MM HG: ICD-10-PCS | Mod: CPTII,S$GLB,, | Performed by: INTERNAL MEDICINE

## 2019-06-26 PROCEDURE — 1101F PR PT FALLS ASSESS DOC 0-1 FALLS W/OUT INJ PAST YR: ICD-10-PCS | Mod: CPTII,S$GLB,, | Performed by: INTERNAL MEDICINE

## 2019-06-26 PROCEDURE — 99244 PR OFFICE CONSULTATION,LEVEL IV: ICD-10-PCS | Mod: S$GLB,,, | Performed by: INTERNAL MEDICINE

## 2019-06-26 PROCEDURE — 3079F PR MOST RECENT DIASTOLIC BLOOD PRESSURE 80-89 MM HG: ICD-10-PCS | Mod: CPTII,S$GLB,, | Performed by: INTERNAL MEDICINE

## 2019-06-26 PROCEDURE — 3008F PR BODY MASS INDEX (BMI) DOCUMENTED: ICD-10-PCS | Mod: CPTII,S$GLB,, | Performed by: INTERNAL MEDICINE

## 2019-06-26 PROCEDURE — 3079F DIAST BP 80-89 MM HG: CPT | Mod: CPTII,S$GLB,, | Performed by: INTERNAL MEDICINE

## 2019-06-26 PROCEDURE — 3008F BODY MASS INDEX DOCD: CPT | Mod: CPTII,S$GLB,, | Performed by: INTERNAL MEDICINE

## 2019-06-26 PROCEDURE — 99999 PR PBB SHADOW E&M-EST. PATIENT-LVL III: ICD-10-PCS | Mod: PBBFAC,,, | Performed by: INTERNAL MEDICINE

## 2019-06-26 PROCEDURE — 99999 PR PBB SHADOW E&M-EST. PATIENT-LVL III: CPT | Mod: PBBFAC,,, | Performed by: INTERNAL MEDICINE

## 2019-06-26 PROCEDURE — 99244 OFF/OP CNSLTJ NEW/EST MOD 40: CPT | Mod: S$GLB,,, | Performed by: INTERNAL MEDICINE

## 2019-06-26 PROCEDURE — 93000 ELECTROCARDIOGRAM COMPLETE: CPT | Mod: S$GLB,,, | Performed by: INTERNAL MEDICINE

## 2019-06-26 PROCEDURE — 3077F SYST BP >= 140 MM HG: CPT | Mod: CPTII,S$GLB,, | Performed by: INTERNAL MEDICINE

## 2019-06-26 NOTE — LETTER
June 27, 2019      Harshad Hernandez MD  [address] [place] [address]           [facility]  [address]  [place] [address]  Phone: [phone]  Fax: [phone]          Patient: Jose Long   MR Number: [id number]   YOB: 1955   Date of Visit: 6/26/2019       Dear Dr. Harshad Hernandez:    Thank you for referring Jose Long to me for evaluation. Attached you will find relevant portions of my assessment and plan of care.    If you have questions, please do not hesitate to call me. I look forward to following Jose Long along with you.    Sincerely,    Joe Ding MD    Enclosure  CC:  No Recipients    If you would like to receive this communication electronically, please contact [email or web address] or [phone] to request more information on [facility] [employer] access.    For providers and/or their staff who would like to refer a patient to [facility], please contact us through our one-stop-shop provider referral line, [facility] , at [phone].    If you feel you have received this communication in error or would no longer like to receive these types of communications, please e-mail [email or web address]

## 2019-06-26 NOTE — PROGRESS NOTES
Subjective:   Patient ID:  Jose Long is a 63 y.o. male who presents for evaluation of Consult and Fatigue      62 yo male, referred for care establish  PMH HTN and HLD. No heart attack, DM and stroke. Mother dementia.   ekg NSR and HR 61 bpm. HR 54 at PCP's office   ECHo normal EF and mild LVH  Holter showed bradycardia, reported palpitation when HR was 43 bpm.  Yard work.  Slightly more fatigue than before  He denied chest pain, dyspnea on exertion, palpitation, fainting, PND, orthopnea, syncope and claudication.   No smoking/drinking        Past Medical History:   Diagnosis Date    Dyslipidemia     Hypertension     Multiple allergies        Past Surgical History:   Procedure Laterality Date    COLONOSCOPY N/A 6/29/2018    Performed by [facility]it [facility] at [facility]    COLONOSCOPY N/A 2/10/2015    Performed by Wade Pimentel MD at [facility]       Social History     Tobacco Use    Smoking status: Former Smoker    Smokeless tobacco: Never Used   Substance Use Topics    Alcohol use: No     Frequency: Never     Binge frequency: Never    Drug use: No       Family History   Problem Relation Age of Onset    Cataracts Mother     Hypertension Mother     Alzheimer's disease Mother     Diabetes Maternal Aunt     Macular degeneration Maternal Aunt     Diabetes Paternal Grandmother     Colon cancer Father     Melanoma Neg Hx        Review of Systems   Constitution: Positive for malaise/fatigue. Negative for decreased appetite, diaphoresis, fever and night sweats.   HENT: Negative for nosebleeds.    Eyes: Negative for blurred vision and double vision.   Cardiovascular: Negative for chest pain, claudication, dyspnea on exertion, irregular heartbeat, leg swelling, near-syncope, orthopnea, palpitations, paroxysmal nocturnal dyspnea and syncope.   Respiratory: Negative for cough, shortness of breath, sleep disturbances due to breathing, snoring, sputum production and wheezing.    Endocrine:  Negative for cold intolerance and polyuria.   Hematologic/Lymphatic: Does not bruise/bleed easily.   Skin: Negative for rash.   Musculoskeletal: Negative for back pain, falls, joint pain, joint swelling and neck pain.   Gastrointestinal: Negative for abdominal pain, heartburn, nausea and vomiting.   Genitourinary: Negative for dysuria, frequency and hematuria.   Neurological: Negative for difficulty with concentration, dizziness, focal weakness, headaches, light-headedness, numbness, seizures and weakness.   Psychiatric/Behavioral: Negative for depression, memory loss and substance abuse. The patient does not have insomnia.    Allergic/Immunologic: Negative for HIV exposure and hives.       Objective:   Physical Exam   Constitutional: He is oriented to person, place, and time. He appears well-nourished.   HENT:   Head: Normocephalic.   Eyes: Pupils are equal, round, and reactive to light.   Neck: Normal carotid pulses and no JVD present. Carotid bruit is not present. No thyromegaly present.   Cardiovascular: Normal rate, regular rhythm, normal heart sounds and normal pulses.  No extrasystoles are present. PMI is not displaced. Exam reveals no gallop and no S3.   No murmur heard.  Pulmonary/Chest: Breath sounds normal. No stridor. No respiratory distress.   Abdominal: Soft. Bowel sounds are normal. There is no tenderness. There is no rebound.   Musculoskeletal: Normal range of motion.   Neurological: He is alert and oriented to person, place, and time.   Skin: Skin is intact. No rash noted.   Psychiatric: His behavior is normal.       Lab Results   Component Value Date    CHOL 187 05/24/2019    CHOL 181 05/25/2018    CHOL 205 (H) 05/25/2017     Lab Results   Component Value Date    HDL 55 05/24/2019    HDL 46 05/25/2018    HDL 55 05/25/2017     Lab Results   Component Value Date    LDLCALC 119.4 05/24/2019    LDLCALC 120.8 05/25/2018    LDLCALC 134.4 05/25/2017     Lab Results   Component Value Date    TRIG 63  05/24/2019    TRIG 71 05/25/2018    TRIG 78 05/25/2017     Lab Results   Component Value Date    CHOLHDL 29.4 05/24/2019    CHOLHDL 25.4 05/25/2018    CHOLHDL 26.8 05/25/2017       Chemistry        Component Value Date/Time     05/24/2019 0949    K 3.8 05/24/2019 0949     05/24/2019 0949    CO2 29 05/24/2019 0949    BUN 10 05/24/2019 0949    CREATININE 1.2 05/24/2019 0949    GLU 80 05/24/2019 0949        Component Value Date/Time    CALCIUM 9.8 05/24/2019 0949    ALKPHOS 72 05/24/2019 0949    AST 28 05/24/2019 0949    ALT 18 05/24/2019 0949    BILITOT 0.7 05/24/2019 0949    ESTGFRAFRICA >60.0 05/24/2019 0949    EGFRNONAA >60.0 05/24/2019 0949          No results found for: LABA1C, HGBA1C  Lab Results   Component Value Date    TSH 0.847 05/24/2019     No results found for: INR, PROTIME  Lab Results   Component Value Date    WBC 5.78 05/24/2019    HGB 15.4 05/24/2019    HCT 46.2 05/24/2019    MCV 89 05/24/2019     05/24/2019     BNP  @LABRCNTIP(BNP,BNPTRIAGEBLO)@  CrCl cannot be calculated (Patient's most recent lab result is older than the maximum 7 days allowed.).  No results found in the last 24 hours.  No results found in the last 24 hours.  No results found in the last 24 hours.    Assessment:      1. Essential hypertension    2. Dyslipidemia    3. Bradycardia      Sinus bradycardia, minimal symptoms  BP usually controlled at home    Plan:   ETT to r/o ischemia related bradycardia  Discussed the possibility of PPM in the future  Discussed DASH and continue to check BP at home  Continue current meds.  Recommend heart-healthy diet, weight control and regular exercise.  Rahul. Risk modification.   RTC in 6 months    I have reviewed all pertinent labs and cardiac studies. Plans and recommendations have been formulated under my direct supervision. All questions answered and patient voiced understanding. Patient to continue current medications.

## 2019-07-02 ENCOUNTER — OFFICE VISIT (OUTPATIENT)
Dept: OPHTHALMOLOGY | Facility: CLINIC | Age: 64
End: 2019-07-02
Payer: COMMERCIAL

## 2019-07-02 DIAGNOSIS — H25.13 NUCLEAR SCLEROSIS OF BOTH EYES: ICD-10-CM

## 2019-07-02 DIAGNOSIS — Z13.5 GLAUCOMA SCREENING: ICD-10-CM

## 2019-07-02 DIAGNOSIS — H52.4 PRESBYOPIA: ICD-10-CM

## 2019-07-02 DIAGNOSIS — I10 ESSENTIAL HYPERTENSION: Primary | ICD-10-CM

## 2019-07-02 PROCEDURE — 92015 PR REFRACTION: ICD-10-PCS | Mod: S$GLB,,, | Performed by: OPTOMETRIST

## 2019-07-02 PROCEDURE — 99999 PR PBB SHADOW E&M-EST. PATIENT-LVL II: CPT | Mod: PBBFAC,,, | Performed by: OPTOMETRIST

## 2019-07-02 PROCEDURE — 92004 PR EYE EXAM, NEW PATIENT,COMPREHESV: ICD-10-PCS | Mod: S$GLB,,, | Performed by: OPTOMETRIST

## 2019-07-02 PROCEDURE — 92250 FUNDUS PHOTOGRAPHY W/I&R: CPT | Mod: S$GLB,,, | Performed by: OPTOMETRIST

## 2019-07-02 PROCEDURE — 92015 DETERMINE REFRACTIVE STATE: CPT | Mod: S$GLB,,, | Performed by: OPTOMETRIST

## 2019-07-02 PROCEDURE — 92004 COMPRE OPH EXAM NEW PT 1/>: CPT | Mod: S$GLB,,, | Performed by: OPTOMETRIST

## 2019-07-02 PROCEDURE — 99999 PR PBB SHADOW E&M-EST. PATIENT-LVL II: ICD-10-PCS | Mod: PBBFAC,,, | Performed by: OPTOMETRIST

## 2019-07-02 PROCEDURE — 92250 COLOR FUNDUS PHOTOGRAPHY - OU - BOTH EYES: ICD-10-PCS | Mod: S$GLB,,, | Performed by: OPTOMETRIST

## 2019-07-02 NOTE — PROGRESS NOTES
HPI     Hypertensive Eye Exam      Additional comments: Yearly              Dry Eye      Additional comments: OU              Comments     NP to [facility]  CC-sees better without glasses at distance  Last eye exam 2 years ago  Previously seen by [facility] on 8/18/15  Has noticeable changes in near vision since last exam  Wears Bifocal glasses full-dang updated 2 years ago  States he wears OTC readers +2.50 to look at the computer screen  C/O dryness OU  Patient states he uses AT's about TID OU            Last edited by Jodi Boateng, OD on 7/2/2019  9:54 AM. (History)            Assessment /Plan     For exam results, see Encounter Report.    Essential hypertension    Nuclear sclerosis of both eyes    Glaucoma screening    Presbyopia      No hypertensive retinopathy.  Very early nuclear sclerosis.  Refractive error change.  Glaucoma screening and fundus exam normal.  Updated glasses prescription.  Return to clinic 1 yr.

## 2019-07-15 ENCOUNTER — CLINICAL SUPPORT (OUTPATIENT)
Dept: CARDIOLOGY | Facility: CLINIC | Age: 64
End: 2019-07-15
Attending: INTERNAL MEDICINE
Payer: COMMERCIAL

## 2019-07-15 DIAGNOSIS — R00.1 BRADYCARDIA: ICD-10-CM

## 2019-07-15 LAB — DIASTOLIC DYSFUNCTION: NO

## 2019-07-15 PROCEDURE — 93015 CARDIAC TREADMILL STRESS TEST: ICD-10-PCS | Mod: S$GLB,,, | Performed by: NUCLEAR MEDICINE

## 2019-07-15 PROCEDURE — 93015 CV STRESS TEST SUPVJ I&R: CPT | Mod: S$GLB,,, | Performed by: NUCLEAR MEDICINE

## 2019-07-17 ENCOUNTER — TELEPHONE (OUTPATIENT)
Dept: CARDIOLOGY | Facility: CLINIC | Age: 64
End: 2019-07-17

## 2019-07-17 NOTE — TELEPHONE ENCOUNTER
Spoke with pt with stress echo results.  Pt stated his BP is normal at home.  Typically in the 120's / 70's range. Will let Dr Ding know.     ----- Message from Ursula Heath sent at 7/17/2019 11:28 AM CDT -----  Contact: pt  Type:  Patient Returning Call    Who Called:Patient  Who Left Message for Patient:Aylin  Does the patient know what this is regarding?:test results  Would the patient rather a call back or a response via [facility]? Call back  Best Call Back Number:[phone]  Additional Information: na

## 2019-07-17 NOTE — TELEPHONE ENCOUNTER
Attempted without success to contact pt with   Stress ekg ok   Had high BP   What is the BP reading at home?     Left vm to return call.     ----- Message from Joe Ding MD sent at 7/17/2019  8:14 AM CDT -----  Stress ekg ok  Had high BP   What is the BP reading at home?

## 2019-07-19 ENCOUNTER — TELEPHONE (OUTPATIENT)
Dept: CARDIOLOGY | Facility: CLINIC | Age: 64
End: 2019-07-19

## 2019-09-19 RX ORDER — AMLODIPINE AND BENAZEPRIL HYDROCHLORIDE 10; 20 MG/1; MG/1
CAPSULE ORAL
Qty: 90 CAPSULE | Refills: 4 | Status: SHIPPED | OUTPATIENT
Start: 2019-09-19 | End: 2019-09-23 | Stop reason: SDUPTHER

## 2019-09-23 ENCOUNTER — LAB VISIT (OUTPATIENT)
Dept: LAB | Facility: HOSPITAL | Age: 64
End: 2019-09-23
Payer: COMMERCIAL

## 2019-09-23 ENCOUNTER — OFFICE VISIT (OUTPATIENT)
Dept: FAMILY MEDICINE | Facility: CLINIC | Age: 64
End: 2019-09-23
Payer: COMMERCIAL

## 2019-09-23 VITALS
TEMPERATURE: 98 F | DIASTOLIC BLOOD PRESSURE: 70 MMHG | BODY MASS INDEX: 23.08 KG/M2 | SYSTOLIC BLOOD PRESSURE: 138 MMHG | WEIGHT: 160.81 LBS | OXYGEN SATURATION: 98 % | HEART RATE: 70 BPM

## 2019-09-23 DIAGNOSIS — I10 ESSENTIAL HYPERTENSION: Primary | Chronic | ICD-10-CM

## 2019-09-23 DIAGNOSIS — R97.20 PSA ELEVATION: ICD-10-CM

## 2019-09-23 DIAGNOSIS — E78.5 DYSLIPIDEMIA: ICD-10-CM

## 2019-09-23 PROCEDURE — 99999 PR PBB SHADOW E&M-EST. PATIENT-LVL III: CPT | Mod: PBBFAC,,, | Performed by: INTERNAL MEDICINE

## 2019-09-23 PROCEDURE — 99999 PR PBB SHADOW E&M-EST. PATIENT-LVL III: ICD-10-PCS | Mod: PBBFAC,,, | Performed by: INTERNAL MEDICINE

## 2019-09-23 PROCEDURE — 84153 ASSAY OF PSA TOTAL: CPT

## 2019-09-23 PROCEDURE — 99214 PR OFFICE/OUTPT VISIT, EST, LEVL IV, 30-39 MIN: ICD-10-PCS | Mod: S$GLB,,, | Performed by: INTERNAL MEDICINE

## 2019-09-23 PROCEDURE — 1101F PR PT FALLS ASSESS DOC 0-1 FALLS W/OUT INJ PAST YR: ICD-10-PCS | Mod: CPTII,S$GLB,, | Performed by: INTERNAL MEDICINE

## 2019-09-23 PROCEDURE — 36415 COLL VENOUS BLD VENIPUNCTURE: CPT | Mod: PO

## 2019-09-23 PROCEDURE — 3008F BODY MASS INDEX DOCD: CPT | Mod: CPTII,S$GLB,, | Performed by: INTERNAL MEDICINE

## 2019-09-23 PROCEDURE — 3008F PR BODY MASS INDEX (BMI) DOCUMENTED: ICD-10-PCS | Mod: CPTII,S$GLB,, | Performed by: INTERNAL MEDICINE

## 2019-09-23 PROCEDURE — 99214 OFFICE O/P EST MOD 30 MIN: CPT | Mod: S$GLB,,, | Performed by: INTERNAL MEDICINE

## 2019-09-23 PROCEDURE — 3075F SYST BP GE 130 - 139MM HG: CPT | Mod: CPTII,S$GLB,, | Performed by: INTERNAL MEDICINE

## 2019-09-23 PROCEDURE — 3078F DIAST BP <80 MM HG: CPT | Mod: CPTII,S$GLB,, | Performed by: INTERNAL MEDICINE

## 2019-09-23 PROCEDURE — 3078F PR MOST RECENT DIASTOLIC BLOOD PRESSURE < 80 MM HG: ICD-10-PCS | Mod: CPTII,S$GLB,, | Performed by: INTERNAL MEDICINE

## 2019-09-23 PROCEDURE — 1101F PT FALLS ASSESS-DOCD LE1/YR: CPT | Mod: CPTII,S$GLB,, | Performed by: INTERNAL MEDICINE

## 2019-09-23 PROCEDURE — 3075F PR MOST RECENT SYSTOLIC BLOOD PRESS GE 130-139MM HG: ICD-10-PCS | Mod: CPTII,S$GLB,, | Performed by: INTERNAL MEDICINE

## 2019-09-23 RX ORDER — AMLODIPINE AND BENAZEPRIL HYDROCHLORIDE 10; 20 MG/1; MG/1
1 CAPSULE ORAL DAILY
Qty: 90 CAPSULE | Refills: 4 | Status: SHIPPED | OUTPATIENT
Start: 2019-09-23 | End: 2020-12-17

## 2019-09-23 NOTE — PROGRESS NOTES
Subjective:       Patient ID: Jose Long is a 63 y.o. male.    Chief Complaint: Follow-up; Hypertension; and Hyperlipidemia    Follow-up   Pertinent negatives include no arthralgias, chest pain, headaches, joint swelling, neck pain, vomiting or weakness.   Hypertension   Pertinent negatives include no chest pain, headaches, neck pain or palpitations.   Hyperlipidemia   Pertinent negatives include no chest pain.     Past Medical History:   Diagnosis Date    Dyslipidemia     Hypertension     Multiple allergies      Past Surgical History:   Procedure Laterality Date    COLONOSCOPY N/A 6/29/2018    Performed by [facility]ermit [facility] at [facility]    COLONOSCOPY N/A 2/10/2015    Performed by Wade Pimentel MD at [facility] ENDO     Family History   Problem Relation Age of Onset    Cataracts Mother     Hypertension Mother     Alzheimer's disease Mother     Diabetes Maternal Aunt     Macular degeneration Maternal Aunt     Diabetes Paternal Grandmother     Colon cancer Father     Melanoma Neg Hx      Social History     Socioeconomic History    Marital status: Single     Spouse name: Not on file    Number of children: Not on file    Years of education: Not on file    Highest education level: Not on file   Occupational History    Not on file   Social Needs    Financial resource strain: Not hard at all    Food insecurity:     Worry: Never true     Inability: Never true    Transportation needs:     Medical: No     Non-medical: Not on file   Tobacco Use    Smoking status: Former Smoker    Smokeless tobacco: Never Used   Substance and Sexual Activity    Alcohol use: No     Frequency: Never     Binge frequency: Never    Drug use: No    Sexual activity: Not on file   Lifestyle    Physical activity:     Days per week: 2 days     Minutes per session: 90 min    Stress: Not at all   Relationships    Social connections:     Talks on phone: More than three times a week     Gets together: More than three times  a week     Attends Protestant service: Not on file     Active member of club or organization: No     Attends meetings of clubs or organizations: Never     Relationship status: Never    Other Topics Concern    Not on file   Social History Narrative    Not on file     Review of Systems   Constitutional: Negative for activity change and unexpected weight change.   HENT: Negative for hearing loss, rhinorrhea and trouble swallowing.    Eyes: Negative for discharge and visual disturbance.   Respiratory: Negative for chest tightness and wheezing.    Cardiovascular: Negative for chest pain and palpitations.   Gastrointestinal: Negative for blood in stool, constipation, diarrhea and vomiting.   Endocrine: Negative for polydipsia and polyuria.   Genitourinary: Negative for difficulty urinating, hematuria and urgency.   Musculoskeletal: Negative for arthralgias, joint swelling and neck pain.   Neurological: Negative for weakness and headaches.   Psychiatric/Behavioral: Negative for confusion and dysphoric mood.       Objective:      Physical Exam   Constitutional: He is oriented to person, place, and time. He appears well-developed and well-nourished. No distress.   HENT:   Head: Normocephalic and atraumatic.   Eyes: Pupils are equal, round, and reactive to light.   Neck: Normal range of motion. Neck supple. No JVD present. Carotid bruit is not present. No tracheal deviation present. No thyromegaly present.   Cardiovascular: Normal rate, regular rhythm, normal heart sounds and intact distal pulses.   Pulmonary/Chest: Effort normal and breath sounds normal. No respiratory distress. He has no wheezes. He has no rales. He exhibits no tenderness.   Abdominal: Soft. Bowel sounds are normal. He exhibits no distension. There is no tenderness. There is no rebound and no guarding.   Musculoskeletal: Normal range of motion. He exhibits no edema or tenderness.   Lymphadenopathy:     He has no cervical adenopathy.   Neurological:  He is alert and oriented to person, place, and time.   Skin: Skin is warm and dry. No rash noted. He is not diaphoretic. No erythema. No pallor.   Psychiatric: He has a normal mood and affect. His behavior is normal. Judgment and thought content normal.   Nursing note and vitals reviewed.      CMP  Sodium   Date Value Ref Range Status   05/24/2019 143 136 - 145 mmol/L Final     Potassium   Date Value Ref Range Status   05/24/2019 3.8 3.5 - 5.1 mmol/L Final     Chloride   Date Value Ref Range Status   05/24/2019 104 95 - 110 mmol/L Final     CO2   Date Value Ref Range Status   05/24/2019 29 23 - 29 mmol/L Final     Glucose   Date Value Ref Range Status   05/24/2019 80 70 - 110 mg/dL Final     BUN, Bld   Date Value Ref Range Status   05/24/2019 10 8 - 23 mg/dL Final     Creatinine   Date Value Ref Range Status   05/24/2019 1.2 0.5 - 1.4 mg/dL Final     Calcium   Date Value Ref Range Status   05/24/2019 9.8 8.7 - 10.5 mg/dL Final     Total Protein   Date Value Ref Range Status   05/24/2019 7.5 6.0 - 8.4 g/dL Final     Albumin   Date Value Ref Range Status   05/24/2019 4.4 3.5 - 5.2 g/dL Final     Total Bilirubin   Date Value Ref Range Status   05/24/2019 0.7 0.1 - 1.0 mg/dL Final     Comment:     For infants and newborns, interpretation of results should be based  on gestational age, weight and in agreement with clinical  observations.  Premature Infant recommended reference ranges:  Up to 24 hours.............<8.0 mg/dL  Up to 48 hours............<12.0 mg/dL  3-5 days..................<15.0 mg/dL  6-29 days.................<15.0 mg/dL       Alkaline Phosphatase   Date Value Ref Range Status   05/24/2019 72 55 - 135 U/L Final     AST   Date Value Ref Range Status   05/24/2019 28 10 - 40 U/L Final     ALT   Date Value Ref Range Status   05/24/2019 18 10 - 44 U/L Final     Anion Gap   Date Value Ref Range Status   05/24/2019 10 8 - 16 mmol/L Final     eGFR if    Date Value Ref Range Status   05/24/2019  >60.0 >60 mL/min/1.73 m^2 Final     eGFR if non    Date Value Ref Range Status   05/24/2019 >60.0 >60 mL/min/1.73 m^2 Final     Comment:     Calculation used to obtain the estimated glomerular filtration  rate (eGFR) is the CKD-EPI equation.        Lab Results   Component Value Date    WBC 5.78 05/24/2019    HGB 15.4 05/24/2019    HCT 46.2 05/24/2019    MCV 89 05/24/2019     05/24/2019     Lab Results   Component Value Date    CHOL 187 05/24/2019     Lab Results   Component Value Date    HDL 55 05/24/2019     Lab Results   Component Value Date    LDLCALC 119.4 05/24/2019     Lab Results   Component Value Date    TRIG 63 05/24/2019     Lab Results   Component Value Date    CHOLHDL 29.4 05/24/2019     Lab Results   Component Value Date    TSH 0.847 05/24/2019     No results found for: LABA1C, HGBA1C  Assessment:       1. Essential hypertension    2. Dyslipidemia    3. PSA elevation        Plan:   Essential hypertension----stable  Dyslipidemia------stable-    PSA elevation  -     Prostate Specific Antigen, Diagnostic; Future; Expected date: 09/23/2019    Other orders  -     amlodipine-benazepril 10-20mg (LOTREL) 10-20 mg per capsule; Take 1 capsule by mouth once daily.  Dispense: 90 capsule; Refill: 4    F/u 4 months./

## 2019-09-24 LAB — COMPLEXED PSA SERPL-MCNC: 2.8 NG/ML (ref 0–4)

## 2020-01-07 ENCOUNTER — OFFICE VISIT (OUTPATIENT)
Dept: DERMATOLOGY | Facility: CLINIC | Age: 65
End: 2020-01-07
Payer: COMMERCIAL

## 2020-01-07 DIAGNOSIS — D18.01 HEMANGIOMA OF SKIN: ICD-10-CM

## 2020-01-07 DIAGNOSIS — L57.0 ACTINIC KERATOSES: ICD-10-CM

## 2020-01-07 DIAGNOSIS — L82.1 SEBORRHEIC KERATOSIS: Primary | ICD-10-CM

## 2020-01-07 PROCEDURE — 1101F PR PT FALLS ASSESS DOC 0-1 FALLS W/OUT INJ PAST YR: ICD-10-PCS | Mod: CPTII,S$GLB,, | Performed by: DERMATOLOGY

## 2020-01-07 PROCEDURE — 1126F AMNT PAIN NOTED NONE PRSNT: CPT | Mod: S$GLB,,, | Performed by: DERMATOLOGY

## 2020-01-07 PROCEDURE — 17003 DESTRUCTION, PREMALIGNANT LESIONS; SECOND THROUGH 14 LESIONS: ICD-10-PCS | Mod: S$GLB,,, | Performed by: DERMATOLOGY

## 2020-01-07 PROCEDURE — 99213 PR OFFICE/OUTPT VISIT, EST, LEVL III, 20-29 MIN: ICD-10-PCS | Mod: 25,S$GLB,, | Performed by: DERMATOLOGY

## 2020-01-07 PROCEDURE — 99213 OFFICE O/P EST LOW 20 MIN: CPT | Mod: 25,S$GLB,, | Performed by: DERMATOLOGY

## 2020-01-07 PROCEDURE — 17003 DESTRUCT PREMALG LES 2-14: CPT | Mod: S$GLB,,, | Performed by: DERMATOLOGY

## 2020-01-07 PROCEDURE — 17000 DESTRUCT PREMALG LESION: CPT | Mod: S$GLB,,, | Performed by: DERMATOLOGY

## 2020-01-07 PROCEDURE — 99999 PR PBB SHADOW E&M-EST. PATIENT-LVL III: CPT | Mod: PBBFAC,,, | Performed by: DERMATOLOGY

## 2020-01-07 PROCEDURE — 99999 PR PBB SHADOW E&M-EST. PATIENT-LVL III: ICD-10-PCS | Mod: PBBFAC,,, | Performed by: DERMATOLOGY

## 2020-01-07 PROCEDURE — 1159F PR MEDICATION LIST DOCUMENTED IN MEDICAL RECORD: ICD-10-PCS | Mod: S$GLB,,, | Performed by: DERMATOLOGY

## 2020-01-07 PROCEDURE — 1126F PR PAIN SEVERITY QUANTIFIED, NO PAIN PRESENT: ICD-10-PCS | Mod: S$GLB,,, | Performed by: DERMATOLOGY

## 2020-01-07 PROCEDURE — 1101F PT FALLS ASSESS-DOCD LE1/YR: CPT | Mod: CPTII,S$GLB,, | Performed by: DERMATOLOGY

## 2020-01-07 PROCEDURE — 1159F MED LIST DOCD IN RCRD: CPT | Mod: S$GLB,,, | Performed by: DERMATOLOGY

## 2020-01-07 PROCEDURE — 17000 PR DESTRUCTION(LASER SURGERY,CRYOSURGERY,CHEMOSURGERY),PREMALIGNANT LESIONS,FIRST LESION: ICD-10-PCS | Mod: S$GLB,,, | Performed by: DERMATOLOGY

## 2020-01-07 NOTE — PATIENT INSTRUCTIONS
CRYOSURGERY      Your doctor has used a method called cryosurgery to treat your skin condition. Cryosurgery refers to the use of very cold substances to treat a variety of skin conditions such as warts, pre-skin cancers, molluscum contagiosum, sun spots, and several benign growths. The substance we use in cryosurgery is liquid nitrogen and is so cold (-195 degrees Celsius) that is burns when administered.     Following treatment in the office, the skin may immediately burn and become red. You may find the area around the lesion is affected as well. It is sometimes necessary to treat not only the lesion, but a small area of the surrounding normal skin to achieve a good response.     A blister, and even a blood filled blister, may form after treatment.   This is a normal response. If the blister is painful, it is acceptable to sterilize a needle and with rubbing alcohol and gently pop the blister. It is important that you gently wash the area with soap and warm water as the blister fluid may contain wart virus if a wart was treated. Do no remove the roof of the blister.     The area treated can take anywhere from 1-3 weeks to heal. Healing time depends on the kind of skin lesion treated, the location, and how aggressively the lesion was treated. It is recommended that the areas treated are covered with Vaseline or bacitracin ointment and a band-aid. If a band-aid is not practical, just ointment applied several times per day will do. Keeping these areas moist will speed the healing time.    Treatment with liquid nitrogen can leave a scar. In dark skin, it may be a light or dark scar, in light skin it may be a white or pink scar. These will generally fade with time.    If you have any concerns after your treatment, please feel free to call the office.         [facility] D[facility]  [address] [id number]  Dept: [phone]  Dept Fax: [phone]      Preventing Skin  Cancer  Relaxing in the sun may feel good. But it isnt good for your skin. In fact, being exposed to the suns harmful rays is a major cause of skin cancer. This is a serious disease that can be life-threatening. People of all ages and backgrounds are at risk. But in most cases, skin cancer can be prevented.    Your Role in Prevention  You can act today to help prevent skin cancer. Start by avoiding the suns UV (ultraviolet) rays. And dont use tanning beds, which are no safer than the sun. Taking these steps can help keep you from getting skin cancer. It can also help prevent wrinkles and other sun-induced aging effects. Make sure your children also follow these safeguards. Now is the time to start taking preventive steps against skin cancer.  When You Are Outdoors  Protect your skin when you go outdoors during the day. Take precautions whenever you go out to eat, run errands by car or on foot, or do any outdoor activity. There isnt just one easy way to protect your skin. Its best to follow all of these steps:  · Wear tightly woven clothing that covers your skin. Put on a wide-brimmed hat to protect your face, ears, and scalp.  · Watch the clock. Try to avoid the sun between 10 a.m. and 4 p.m., when it is strongest.  · Head for the shade or create your own. Use an umbrella when sitting or strolling.  · Know that the suns rays can reflect off sand, water, and snow. This can harm your skin. Take extra care when you are near reflective surfaces.  · Keep in mind that even when the weather is hazy or cloudy, your skin can be exposed to strong UV rays.  · Shield your skin with sunscreen. Also, apply sunscreen to your childrens skin.  Tips for Using Sunscreen  To help prevent skin cancer, choose the right sunscreen and use it correctly. Try the following tips:  · Choose a sunscreen that has a sun protection factor (SPF) of at least 15. For the best protection, an SPF of at least 30 is preferred. Also, choose a  sunscreen labeled broad spectrum. This will shield you from both UVA and UVB (ultraviolet A and B) rays.  · If one brand irritates your skin, try another, particularly ones without fragrance.  · Use a water-resistant sunscreen if swimming or sweating.  · Reapply sunscreen every 2 hours. If youre active, do this more often.  · Cover any sun-exposed skin, from your face to your feet. Dont forget your ears and your lips.  · Know that while sunscreen helps protect you, it isnt enough. You should also wear protective clothing. And try to stay out of the sun as much as you can, especially from 10 a.m. to 4 p.m.  © 5715-5575 [employer]. [address] [place] [address], [place] [address]. All rights reserved. This information is not intended as a substitute for professional medical care. Always follow your healthcare professional's instructions.

## 2020-01-07 NOTE — PROGRESS NOTES
Subjective:       Patient ID:  Jose Long is a 64 y.o. male who presents for   Chief Complaint   Patient presents with    Skin Check     UBSE c/o spot to back      Hx of AK's, last seen on 10/17/18.  Here today for new issue:    History of Present Illness: The patient presents with chief complaint of lesion.  Location: right posterior shoulder  Duration: several months  Signs/Symptoms: dry, flaky    Prior treatments: none    The patient denies personal history of skin cancer. Father c/ hx of skin CA.           Review of Systems   Constitutional: Negative for fever and chills.   Gastrointestinal: Negative for nausea.   Skin: Positive for activity-related sunscreen use. Negative for daily sunscreen use and recent sunburn.   Hematologic/Lymphatic: Does not bruise/bleed easily.        Objective:    Physical Exam   Constitutional: He appears well-developed and well-nourished. No distress.   Neurological: He is alert and oriented to person, place, and time. He is not disoriented.   Psychiatric: He has a normal mood and affect.   Skin:   Areas Examined (abnormalities noted in diagram):   Scalp / Hair Palpated and Inspected  Head / Face Inspection Performed  Neck Inspection Performed  Chest / Axilla Inspection Performed  Abdomen Inspection Performed  Back Inspection Performed  RUE Inspected  LUE Inspection Performed  Nails and Digits Inspection Performed                   Diagram Legend     Erythematous scaling macule/papule c/w actinic keratosis       Vascular papule c/w angioma      Pigmented verrucoid papule/plaque c/w seborrheic keratosis      Yellow umbilicated papule c/w sebaceous hyperplasia      Irregularly shaped tan macule c/w lentigo     1-2 mm smooth white papules consistent with Milia      Movable subcutaneous cyst with punctum c/w epidermal inclusion cyst      Subcutaneous movable cyst c/w pilar cyst      Firm pink to brown papule c/w dermatofibroma      Pedunculated fleshy papule(s) c/w skin tag(s)       Evenly pigmented macule c/w junctional nevus     Mildly variegated pigmented, slightly irregular-bordered macule c/w mildly atypical nevus      Flesh colored to evenly pigmented papule c/w intradermal nevus       Pink pearly papule/plaque c/w basal cell carcinoma      Erythematous hyperkeratotic cursted plaque c/w SCC      Surgical scar with no sign of skin cancer recurrence      Open and closed comedones      Inflammatory papules and pustules      Verrucoid papule consistent consistent with wart     Erythematous eczematous patches and plaques     Dystrophic onycholytic nail with subungual debris c/w onychomycosis     Umbilicated papule    Erythematous-base heme-crusted tan verrucoid plaque consistent with inflamed seborrheic keratosis     Erythematous Silvery Scaling Plaque c/w Psoriasis     See annotation      Assessment / Plan:        Seborrheic keratosis  Hemangioma of skin  Reassurance given.  Lesions are benign.    Actinic keratoses  Cryosurgery Procedure Note    The patient is informed of the precancerous quality and need for treatment of these lesions. After risks, benefits and alternatives explained, including blistering, pain, hyper- and hypopigmentation, patient verbally consents to cryotherapy to precancerous lesions. Liquid nitrogen cryosurgery is applied to the 3 actinic keratoses, as detailed in the physical exam, to produce a freeze injury. The patient is aware that blisters may form and is instructed on wound care with gentle cleansing and use of vaseline ointment to keep moist until healed. The patient is supplied a handout on cryosurgery and is instructed to call if lesions do not completely resolve.             Follow up in about 1 year (around 1/7/2021).

## 2020-01-27 ENCOUNTER — OFFICE VISIT (OUTPATIENT)
Dept: FAMILY MEDICINE | Facility: CLINIC | Age: 65
End: 2020-01-27
Payer: COMMERCIAL

## 2020-01-27 VITALS
WEIGHT: 159.63 LBS | DIASTOLIC BLOOD PRESSURE: 80 MMHG | TEMPERATURE: 98 F | SYSTOLIC BLOOD PRESSURE: 138 MMHG | HEIGHT: 70 IN | HEART RATE: 71 BPM | OXYGEN SATURATION: 97 % | BODY MASS INDEX: 22.85 KG/M2 | RESPIRATION RATE: 17 BRPM

## 2020-01-27 DIAGNOSIS — I10 ESSENTIAL HYPERTENSION: Primary | Chronic | ICD-10-CM

## 2020-01-27 DIAGNOSIS — E78.5 DYSLIPIDEMIA: ICD-10-CM

## 2020-01-27 PROCEDURE — 99214 OFFICE O/P EST MOD 30 MIN: CPT | Mod: S$GLB,,, | Performed by: INTERNAL MEDICINE

## 2020-01-27 PROCEDURE — 1101F PT FALLS ASSESS-DOCD LE1/YR: CPT | Mod: CPTII,S$GLB,, | Performed by: INTERNAL MEDICINE

## 2020-01-27 PROCEDURE — 1159F PR MEDICATION LIST DOCUMENTED IN MEDICAL RECORD: ICD-10-PCS | Mod: S$GLB,,, | Performed by: INTERNAL MEDICINE

## 2020-01-27 PROCEDURE — 1101F PR PT FALLS ASSESS DOC 0-1 FALLS W/OUT INJ PAST YR: ICD-10-PCS | Mod: CPTII,S$GLB,, | Performed by: INTERNAL MEDICINE

## 2020-01-27 PROCEDURE — 99999 PR PBB SHADOW E&M-EST. PATIENT-LVL III: ICD-10-PCS | Mod: PBBFAC,,, | Performed by: INTERNAL MEDICINE

## 2020-01-27 PROCEDURE — 1126F AMNT PAIN NOTED NONE PRSNT: CPT | Mod: S$GLB,,, | Performed by: INTERNAL MEDICINE

## 2020-01-27 PROCEDURE — 3079F DIAST BP 80-89 MM HG: CPT | Mod: CPTII,S$GLB,, | Performed by: INTERNAL MEDICINE

## 2020-01-27 PROCEDURE — 3075F PR MOST RECENT SYSTOLIC BLOOD PRESS GE 130-139MM HG: ICD-10-PCS | Mod: CPTII,S$GLB,, | Performed by: INTERNAL MEDICINE

## 2020-01-27 PROCEDURE — 3075F SYST BP GE 130 - 139MM HG: CPT | Mod: CPTII,S$GLB,, | Performed by: INTERNAL MEDICINE

## 2020-01-27 PROCEDURE — 99999 PR PBB SHADOW E&M-EST. PATIENT-LVL III: CPT | Mod: PBBFAC,,, | Performed by: INTERNAL MEDICINE

## 2020-01-27 PROCEDURE — 99214 PR OFFICE/OUTPT VISIT, EST, LEVL IV, 30-39 MIN: ICD-10-PCS | Mod: S$GLB,,, | Performed by: INTERNAL MEDICINE

## 2020-01-27 PROCEDURE — 1126F PR PAIN SEVERITY QUANTIFIED, NO PAIN PRESENT: ICD-10-PCS | Mod: S$GLB,,, | Performed by: INTERNAL MEDICINE

## 2020-01-27 PROCEDURE — 3079F PR MOST RECENT DIASTOLIC BLOOD PRESSURE 80-89 MM HG: ICD-10-PCS | Mod: CPTII,S$GLB,, | Performed by: INTERNAL MEDICINE

## 2020-01-27 PROCEDURE — 1159F MED LIST DOCD IN RCRD: CPT | Mod: S$GLB,,, | Performed by: INTERNAL MEDICINE

## 2020-01-27 NOTE — PROGRESS NOTES
Subjective:       Patient ID: Jose Long is a 64 y.o. male.    Chief Complaint: Follow-up; Hypertension; and Hyperlipidemia    Follow-up   Pertinent negatives include no abdominal pain, arthralgias, chest pain, chills, coughing, diaphoresis, fatigue, fever, headaches, joint swelling, myalgias, nausea, neck pain, numbness, rash, sore throat, vomiting or weakness.   Hypertension   Pertinent negatives include no chest pain, headaches, neck pain, palpitations or shortness of breath.   Hyperlipidemia   Pertinent negatives include no chest pain, myalgias or shortness of breath.     Past Medical History:   Diagnosis Date    Dyslipidemia     Hypertension     Multiple allergies      Past Surgical History:   Procedure Laterality Date    COLONOSCOPY N/A 6/29/2018    Procedure: COLONOSCOPY;  Surgeon: Kermit Leone MD;  Location: [facility];  Service: Endoscopy;  Laterality: N/A;     Family History   Problem Relation Age of Onset    Cataracts Mother     Hypertension Mother     Alzheimer's disease Mother     Diabetes Maternal Aunt     Macular degeneration Maternal Aunt     Diabetes Paternal Grandmother     Colon cancer Father     Melanoma Neg Hx      Social History     Socioeconomic History    Marital status: Single     Spouse name: Not on file    Number of children: Not on file    Years of education: Not on file    Highest education level: Not on file   Occupational History    Not on file   Social Needs    Financial resource strain: Not hard at all    Food insecurity:     Worry: Never true     Inability: Never true    Transportation needs:     Medical: No     Non-medical: Not on file   Tobacco Use    Smoking status: Former Smoker    Smokeless tobacco: Never Used   Substance and Sexual Activity    Alcohol use: No     Frequency: Never     Binge frequency: Never    Drug use: No    Sexual activity: Not on file   Lifestyle    Physical activity:     Days per week: 2 days     Minutes per session: 90 min     Stress: Not at all   Relationships    Social connections:     Talks on phone: More than three times a week     Gets together: More than three times a week     Attends Hindu service: Not on file     Active member of club or organization: No     Attends meetings of clubs or organizations: Never     Relationship status: Never    Other Topics Concern    Not on file   Social History Narrative    Not on file     Review of Systems   Constitutional: Negative for activity change, appetite change, chills, diaphoresis, fatigue, fever and unexpected weight change.   HENT: Negative for drooling, ear discharge, ear pain, facial swelling, hearing loss, mouth sores, nosebleeds, postnasal drip, rhinorrhea, sinus pressure, sneezing, sore throat, tinnitus, trouble swallowing and voice change.    Eyes: Negative for photophobia, discharge, redness and visual disturbance.   Respiratory: Negative for apnea, cough, choking, chest tightness, shortness of breath and wheezing.    Cardiovascular: Negative for chest pain, palpitations and leg swelling.   Gastrointestinal: Negative for abdominal distention, abdominal pain, anal bleeding, blood in stool, constipation, diarrhea, nausea and vomiting.   Endocrine: Negative for cold intolerance, heat intolerance, polydipsia, polyphagia and polyuria.   Genitourinary: Negative for difficulty urinating, dysuria, enuresis, flank pain, frequency, genital sores, hematuria and urgency.   Musculoskeletal: Negative for arthralgias, back pain, gait problem, joint swelling, myalgias, neck pain and neck stiffness.   Skin: Negative for color change, pallor, rash and wound.   Allergic/Immunologic: Negative for food allergies and immunocompromised state.   Neurological: Negative for dizziness, tremors, seizures, syncope, facial asymmetry, speech difficulty, weakness, light-headedness, numbness and headaches.   Hematological: Negative for adenopathy. Does not bruise/bleed easily.    Psychiatric/Behavioral: Negative for agitation, behavioral problems, confusion, decreased concentration, dysphoric mood, hallucinations, self-injury, sleep disturbance and suicidal ideas. The patient is not nervous/anxious and is not hyperactive.        Objective:      Physical Exam   Constitutional: He is oriented to person, place, and time. He appears well-developed and well-nourished. No distress.   HENT:   Head: Normocephalic and atraumatic.   Eyes: Pupils are equal, round, and reactive to light.   Neck: Normal range of motion. Neck supple. No JVD present. Carotid bruit is not present. No tracheal deviation present. No thyromegaly present.   Cardiovascular: Normal rate, regular rhythm, normal heart sounds and intact distal pulses.   Pulmonary/Chest: Effort normal and breath sounds normal. No respiratory distress. He has no wheezes. He has no rales. He exhibits no tenderness.   Abdominal: Soft. Bowel sounds are normal. He exhibits no distension. There is no tenderness. There is no rebound and no guarding.   Musculoskeletal: Normal range of motion. He exhibits no edema or tenderness.   Lymphadenopathy:     He has no cervical adenopathy.   Neurological: He is alert and oriented to person, place, and time.   Skin: Skin is warm and dry. No rash noted. He is not diaphoretic. No erythema. No pallor.   Psychiatric: He has a normal mood and affect. His behavior is normal. Judgment and thought content normal.   Nursing note and vitals reviewed.      CMP  Sodium   Date Value Ref Range Status   05/24/2019 143 136 - 145 mmol/L Final     Potassium   Date Value Ref Range Status   05/24/2019 3.8 3.5 - 5.1 mmol/L Final     Chloride   Date Value Ref Range Status   05/24/2019 104 95 - 110 mmol/L Final     CO2   Date Value Ref Range Status   05/24/2019 29 23 - 29 mmol/L Final     Glucose   Date Value Ref Range Status   05/24/2019 80 70 - 110 mg/dL Final     BUN, Bld   Date Value Ref Range Status   05/24/2019 10 8 - 23 mg/dL Final      Creatinine   Date Value Ref Range Status   05/24/2019 1.2 0.5 - 1.4 mg/dL Final     Calcium   Date Value Ref Range Status   05/24/2019 9.8 8.7 - 10.5 mg/dL Final     Total Protein   Date Value Ref Range Status   05/24/2019 7.5 6.0 - 8.4 g/dL Final     Albumin   Date Value Ref Range Status   05/24/2019 4.4 3.5 - 5.2 g/dL Final     Total Bilirubin   Date Value Ref Range Status   05/24/2019 0.7 0.1 - 1.0 mg/dL Final     Comment:     For infants and newborns, interpretation of results should be based  on gestational age, weight and in agreement with clinical  observations.  Premature Infant recommended reference ranges:  Up to 24 hours.............<8.0 mg/dL  Up to 48 hours............<12.0 mg/dL  3-5 days..................<15.0 mg/dL  6-29 days.................<15.0 mg/dL       Alkaline Phosphatase   Date Value Ref Range Status   05/24/2019 72 55 - 135 U/L Final     AST   Date Value Ref Range Status   05/24/2019 28 10 - 40 U/L Final     ALT   Date Value Ref Range Status   05/24/2019 18 10 - 44 U/L Final     Anion Gap   Date Value Ref Range Status   05/24/2019 10 8 - 16 mmol/L Final     eGFR if    Date Value Ref Range Status   05/24/2019 >60.0 >60 mL/min/1.73 m^2 Final     eGFR if non    Date Value Ref Range Status   05/24/2019 >60.0 >60 mL/min/1.73 m^2 Final     Comment:     Calculation used to obtain the estimated glomerular filtration  rate (eGFR) is the CKD-EPI equation.        Lab Results   Component Value Date    WBC 5.78 05/24/2019    HGB 15.4 05/24/2019    HCT 46.2 05/24/2019    MCV 89 05/24/2019     05/24/2019     Lab Results   Component Value Date    CHOL 187 05/24/2019     Lab Results   Component Value Date    HDL 55 05/24/2019     Lab Results   Component Value Date    LDLCALC 119.4 05/24/2019     Lab Results   Component Value Date    TRIG 63 05/24/2019     Lab Results   Component Value Date    CHOLHDL 29.4 05/24/2019     Lab Results   Component Value Date    TSH  0.847 05/24/2019     No results found for: LABA1C, HGBA1C  Assessment:       1. Essential hypertension    2. Dyslipidemia        Plan:   Essential hypertension    Dyslipidemia    Other orders  -     Hypertension Digital [facility] ([facility]) Enrollment Order  -     Hypertension Digital Medicine ([facility]): Assign Onboarding Questionnaires    Stable--------------------continue current meds, watch diet,exercise.            F/u 6 months-labs

## 2020-06-25 ENCOUNTER — PATIENT OUTREACH (OUTPATIENT)
Dept: ADMINISTRATIVE | Facility: HOSPITAL | Age: 65
End: 2020-06-25

## 2020-06-25 DIAGNOSIS — I10 ESSENTIAL HYPERTENSION: Primary | ICD-10-CM

## 2020-07-09 ENCOUNTER — LAB VISIT (OUTPATIENT)
Dept: LAB | Facility: HOSPITAL | Age: 65
End: 2020-07-09
Attending: INTERNAL MEDICINE
Payer: COMMERCIAL

## 2020-07-09 ENCOUNTER — OFFICE VISIT (OUTPATIENT)
Dept: FAMILY MEDICINE | Facility: CLINIC | Age: 65
End: 2020-07-09
Payer: COMMERCIAL

## 2020-07-09 VITALS
DIASTOLIC BLOOD PRESSURE: 78 MMHG | BODY MASS INDEX: 23.2 KG/M2 | HEART RATE: 62 BPM | WEIGHT: 162.06 LBS | OXYGEN SATURATION: 98 % | SYSTOLIC BLOOD PRESSURE: 126 MMHG | HEIGHT: 70 IN | TEMPERATURE: 98 F

## 2020-07-09 DIAGNOSIS — E78.5 DYSLIPIDEMIA: ICD-10-CM

## 2020-07-09 DIAGNOSIS — Z00.00 ROUTINE ADULT HEALTH MAINTENANCE: ICD-10-CM

## 2020-07-09 DIAGNOSIS — I10 ESSENTIAL HYPERTENSION: ICD-10-CM

## 2020-07-09 DIAGNOSIS — R97.20 PSA ELEVATION: ICD-10-CM

## 2020-07-09 DIAGNOSIS — I10 ESSENTIAL HYPERTENSION: Primary | Chronic | ICD-10-CM

## 2020-07-09 LAB
ALBUMIN SERPL BCP-MCNC: 4.2 G/DL (ref 3.5–5.2)
ALP SERPL-CCNC: 70 U/L (ref 55–135)
ALT SERPL W/O P-5'-P-CCNC: 13 U/L (ref 10–44)
ANION GAP SERPL CALC-SCNC: 10 MMOL/L (ref 8–16)
AST SERPL-CCNC: 19 U/L (ref 10–40)
BASOPHILS # BLD AUTO: 0.06 K/UL (ref 0–0.2)
BASOPHILS NFR BLD: 0.9 % (ref 0–1.9)
BILIRUB SERPL-MCNC: 0.5 MG/DL (ref 0.1–1)
BUN SERPL-MCNC: 14 MG/DL (ref 8–23)
CALCIUM SERPL-MCNC: 9.5 MG/DL (ref 8.7–10.5)
CHLORIDE SERPL-SCNC: 105 MMOL/L (ref 95–110)
CHOLEST SERPL-MCNC: 187 MG/DL (ref 120–199)
CHOLEST/HDLC SERPL: 3.8 {RATIO} (ref 2–5)
CO2 SERPL-SCNC: 30 MMOL/L (ref 23–29)
CREAT SERPL-MCNC: 1.2 MG/DL (ref 0.5–1.4)
DIFFERENTIAL METHOD: ABNORMAL
EOSINOPHIL # BLD AUTO: 1.1 K/UL (ref 0–0.5)
EOSINOPHIL NFR BLD: 16.2 % (ref 0–8)
ERYTHROCYTE [DISTWIDTH] IN BLOOD BY AUTOMATED COUNT: 12.3 % (ref 11.5–14.5)
EST. GFR  (AFRICAN AMERICAN): >60 ML/MIN/1.73 M^2
EST. GFR  (NON AFRICAN AMERICAN): >60 ML/MIN/1.73 M^2
GLUCOSE SERPL-MCNC: 66 MG/DL (ref 70–110)
HCT VFR BLD AUTO: 45.9 % (ref 40–54)
HDLC SERPL-MCNC: 49 MG/DL (ref 40–75)
HDLC SERPL: 26.2 % (ref 20–50)
HGB BLD-MCNC: 14.3 G/DL (ref 14–18)
IMM GRANULOCYTES # BLD AUTO: 0.01 K/UL (ref 0–0.04)
IMM GRANULOCYTES NFR BLD AUTO: 0.1 % (ref 0–0.5)
LDLC SERPL CALC-MCNC: 123.4 MG/DL (ref 63–159)
LYMPHOCYTES # BLD AUTO: 1.2 K/UL (ref 1–4.8)
LYMPHOCYTES NFR BLD: 17.6 % (ref 18–48)
MCH RBC QN AUTO: 29.6 PG (ref 27–31)
MCHC RBC AUTO-ENTMCNC: 31.2 G/DL (ref 32–36)
MCV RBC AUTO: 95 FL (ref 82–98)
MONOCYTES # BLD AUTO: 0.5 K/UL (ref 0.3–1)
MONOCYTES NFR BLD: 7 % (ref 4–15)
NEUTROPHILS # BLD AUTO: 4.1 K/UL (ref 1.8–7.7)
NEUTROPHILS NFR BLD: 58.2 % (ref 38–73)
NONHDLC SERPL-MCNC: 138 MG/DL
NRBC BLD-RTO: 0 /100 WBC
PLATELET # BLD AUTO: 262 K/UL (ref 150–350)
PMV BLD AUTO: 10.9 FL (ref 9.2–12.9)
POTASSIUM SERPL-SCNC: 3.6 MMOL/L (ref 3.5–5.1)
PROT SERPL-MCNC: 7.7 G/DL (ref 6–8.4)
RBC # BLD AUTO: 4.83 M/UL (ref 4.6–6.2)
SODIUM SERPL-SCNC: 145 MMOL/L (ref 136–145)
TRIGL SERPL-MCNC: 73 MG/DL (ref 30–150)
WBC # BLD AUTO: 6.99 K/UL (ref 3.9–12.7)

## 2020-07-09 PROCEDURE — 99999 PR PBB SHADOW E&M-EST. PATIENT-LVL III: CPT | Mod: PBBFAC,,, | Performed by: INTERNAL MEDICINE

## 2020-07-09 PROCEDURE — 83036 HEMOGLOBIN GLYCOSYLATED A1C: CPT

## 2020-07-09 PROCEDURE — 84153 ASSAY OF PSA TOTAL: CPT

## 2020-07-09 PROCEDURE — 3078F PR MOST RECENT DIASTOLIC BLOOD PRESSURE < 80 MM HG: ICD-10-PCS | Mod: CPTII,S$GLB,, | Performed by: INTERNAL MEDICINE

## 2020-07-09 PROCEDURE — 85025 COMPLETE CBC W/AUTO DIFF WBC: CPT

## 2020-07-09 PROCEDURE — 99397 PER PM REEVAL EST PAT 65+ YR: CPT | Mod: S$GLB,,, | Performed by: INTERNAL MEDICINE

## 2020-07-09 PROCEDURE — 80053 COMPREHEN METABOLIC PANEL: CPT

## 2020-07-09 PROCEDURE — 36415 COLL VENOUS BLD VENIPUNCTURE: CPT | Mod: PO

## 2020-07-09 PROCEDURE — 3074F SYST BP LT 130 MM HG: CPT | Mod: CPTII,S$GLB,, | Performed by: INTERNAL MEDICINE

## 2020-07-09 PROCEDURE — 3078F DIAST BP <80 MM HG: CPT | Mod: CPTII,S$GLB,, | Performed by: INTERNAL MEDICINE

## 2020-07-09 PROCEDURE — 99999 PR PBB SHADOW E&M-EST. PATIENT-LVL III: ICD-10-PCS | Mod: PBBFAC,,, | Performed by: INTERNAL MEDICINE

## 2020-07-09 PROCEDURE — 99397 PR PREVENTIVE VISIT,EST,65 & OVER: ICD-10-PCS | Mod: S$GLB,,, | Performed by: INTERNAL MEDICINE

## 2020-07-09 PROCEDURE — 3074F PR MOST RECENT SYSTOLIC BLOOD PRESSURE < 130 MM HG: ICD-10-PCS | Mod: CPTII,S$GLB,, | Performed by: INTERNAL MEDICINE

## 2020-07-09 PROCEDURE — 80061 LIPID PANEL: CPT

## 2020-07-09 NOTE — PROGRESS NOTES
Subjective:       Patient ID: Jose Long is a 64 y.o. male.    Chief Complaint: Follow-up, Hypertension, and Hyperlipidemia    Follow-up  Pertinent negatives include no abdominal pain, arthralgias, chest pain, chills, coughing, diaphoresis, fatigue, fever, headaches, joint swelling, myalgias, nausea, neck pain, numbness, rash, sore throat, vomiting or weakness.   Hypertension  Pertinent negatives include no chest pain, headaches, neck pain, palpitations or shortness of breath.   Hyperlipidemia  Pertinent negatives include no chest pain, myalgias or shortness of breath.     Past Medical History:   Diagnosis Date    Dyslipidemia     Hypertension     Multiple allergies      Past Surgical History:   Procedure Laterality Date    COLONOSCOPY N/A 6/29/2018    Procedure: COLONOSCOPY;  Surgeon: Kermit Leone MD;  Location: [facility];  Service: Endoscopy;  Laterality: N/A;     Family History   Problem Relation Age of Onset    Cataracts Mother     Hypertension Mother     Alzheimer's disease Mother     Diabetes Maternal Aunt     Macular degeneration Maternal Aunt     Diabetes Paternal Grandmother     Colon cancer Father     Melanoma Neg Hx      Social History     Socioeconomic History    Marital status: Single     Spouse name: Not on file    Number of children: Not on file    Years of education: Not on file    Highest education level: Not on file   Occupational History    Not on file   Social Needs    Financial resource strain: Not hard at all    Food insecurity     Worry: Never true     Inability: Never true    Transportation needs     Medical: No     Non-medical: Not on file   Tobacco Use    Smoking status: Former Smoker    Smokeless tobacco: Never Used   Substance and Sexual Activity    Alcohol use: No     Frequency: Never     Binge frequency: Never    Drug use: No    Sexual activity: Not on file   Lifestyle    Physical activity     Days per week: 2 days     Minutes per session: 90 min     Stress: Not at all   Relationships    Social connections     Talks on phone: More than three times a week     Gets together: More than three times a week     Attends Roman Catholic service: Not on file     Active member of club or organization: No     Attends meetings of clubs or organizations: Never     Relationship status: Never    Other Topics Concern    Not on file   Social History Narrative    Not on file     Review of Systems   Constitutional: Negative for activity change, appetite change, chills, diaphoresis, fatigue, fever and unexpected weight change.   HENT: Negative for drooling, ear discharge, ear pain, facial swelling, hearing loss, mouth sores, nosebleeds, postnasal drip, rhinorrhea, sinus pressure, sneezing, sore throat, tinnitus, trouble swallowing and voice change.    Eyes: Negative for photophobia, redness and visual disturbance.   Respiratory: Negative for apnea, cough, choking, chest tightness, shortness of breath and wheezing.    Cardiovascular: Negative for chest pain, palpitations and leg swelling.   Gastrointestinal: Negative for abdominal distention, abdominal pain, anal bleeding, blood in stool, constipation, diarrhea, nausea, rectal pain and vomiting.   Endocrine: Negative for cold intolerance, heat intolerance, polydipsia, polyphagia and polyuria.   Genitourinary: Negative for difficulty urinating, dysuria, enuresis, flank pain, frequency, genital sores, hematuria and urgency.   Musculoskeletal: Negative for arthralgias, back pain, gait problem, joint swelling, myalgias, neck pain and neck stiffness.   Skin: Negative for color change, pallor, rash and wound.   Allergic/Immunologic: Negative for food allergies and immunocompromised state.   Neurological: Negative for dizziness, tremors, seizures, syncope, facial asymmetry, speech difficulty, weakness, light-headedness, numbness and headaches.   Hematological: Negative for adenopathy. Does not bruise/bleed easily.    Psychiatric/Behavioral: Negative for agitation, behavioral problems, confusion, decreased concentration, dysphoric mood, hallucinations, self-injury, sleep disturbance and suicidal ideas. The patient is not nervous/anxious and is not hyperactive.        Objective:      Physical Exam  Vitals signs and nursing note reviewed.   Constitutional:       General: He is not in acute distress.     Appearance: He is well-developed. He is not diaphoretic.   HENT:      Head: Normocephalic and atraumatic.      Mouth/Throat:      Pharynx: No oropharyngeal exudate.   Eyes:      General: No scleral icterus.     Pupils: Pupils are equal, round, and reactive to light.   Neck:      Musculoskeletal: Normal range of motion and neck supple.      Thyroid: No thyromegaly.      Vascular: No carotid bruit or JVD.      Trachea: No tracheal deviation.   Cardiovascular:      Rate and Rhythm: Normal rate and regular rhythm.      Heart sounds: Normal heart sounds.   Pulmonary:      Effort: Pulmonary effort is normal. No respiratory distress.      Breath sounds: Normal breath sounds. No wheezing or rales.   Chest:      Chest wall: No tenderness.   Abdominal:      General: Bowel sounds are normal. There is no distension.      Palpations: Abdomen is soft.      Tenderness: There is no abdominal tenderness. There is no guarding or rebound.   Musculoskeletal: Normal range of motion.         General: No tenderness.   Lymphadenopathy:      Cervical: No cervical adenopathy.   Skin:     General: Skin is warm and dry.      Coloration: Skin is not pale.      Findings: No erythema or rash.   Neurological:      Mental Status: He is alert and oriented to person, place, and time.      Cranial Nerves: No cranial nerve deficit.      Coordination: Coordination normal.   Psychiatric:         Behavior: Behavior normal.         Thought Content: Thought content normal.         Judgment: Judgment normal.         CMP  Sodium   Date Value Ref Range Status   05/24/2019  143 136 - 145 mmol/L Final     Potassium   Date Value Ref Range Status   05/24/2019 3.8 3.5 - 5.1 mmol/L Final     Chloride   Date Value Ref Range Status   05/24/2019 104 95 - 110 mmol/L Final     CO2   Date Value Ref Range Status   05/24/2019 29 23 - 29 mmol/L Final     Glucose   Date Value Ref Range Status   05/24/2019 80 70 - 110 mg/dL Final     BUN, Bld   Date Value Ref Range Status   05/24/2019 10 8 - 23 mg/dL Final     Creatinine   Date Value Ref Range Status   05/24/2019 1.2 0.5 - 1.4 mg/dL Final     Calcium   Date Value Ref Range Status   05/24/2019 9.8 8.7 - 10.5 mg/dL Final     Total Protein   Date Value Ref Range Status   05/24/2019 7.5 6.0 - 8.4 g/dL Final     Albumin   Date Value Ref Range Status   05/24/2019 4.4 3.5 - 5.2 g/dL Final     Total Bilirubin   Date Value Ref Range Status   05/24/2019 0.7 0.1 - 1.0 mg/dL Final     Comment:     For infants and newborns, interpretation of results should be based  on gestational age, weight and in agreement with clinical  observations.  Premature Infant recommended reference ranges:  Up to 24 hours.............<8.0 mg/dL  Up to 48 hours............<12.0 mg/dL  3-5 days..................<15.0 mg/dL  6-29 days.................<15.0 mg/dL       Alkaline Phosphatase   Date Value Ref Range Status   05/24/2019 72 55 - 135 U/L Final     AST   Date Value Ref Range Status   05/24/2019 28 10 - 40 U/L Final     ALT   Date Value Ref Range Status   05/24/2019 18 10 - 44 U/L Final     Anion Gap   Date Value Ref Range Status   05/24/2019 10 8 - 16 mmol/L Final     eGFR if    Date Value Ref Range Status   05/24/2019 >60.0 >60 mL/min/1.73 m^2 Final     eGFR if non    Date Value Ref Range Status   05/24/2019 >60.0 >60 mL/min/1.73 m^2 Final     Comment:     Calculation used to obtain the estimated glomerular filtration  rate (eGFR) is the CKD-EPI equation.        Lab Results   Component Value Date    WBC 5.78 05/24/2019    HGB 15.4 05/24/2019    HCT  46.2 05/24/2019    MCV 89 05/24/2019     05/24/2019     Lab Results   Component Value Date    CHOL 187 05/24/2019     Lab Results   Component Value Date    HDL 55 05/24/2019     Lab Results   Component Value Date    LDLCALC 119.4 05/24/2019     Lab Results   Component Value Date    TRIG 63 05/24/2019     Lab Results   Component Value Date    CHOLHDL 29.4 05/24/2019     Lab Results   Component Value Date    TSH 0.847 05/24/2019     No results found for: LABA1C, HGBA1C  Assessment:       1. Essential hypertension    2. Dyslipidemia    3. Routine adult health maintenance        Plan:   Essential hypertension  -     Comprehensive metabolic panel; Future; Expected date: 07/09/2020  -     CBC auto differential; Future; Expected date: 07/09/2020    Dyslipidemia  -     Lipid Panel; Future; Expected date: 07/09/2020    Routine adult health maintenance  -     PSA, Screening; Future; Expected date: 07/09/2020  -     Hemoglobin A1C; Future; Expected date: 07/09/2020    Stable-----------------continue current meds-------------------f/u 6 months-----------------

## 2020-07-10 LAB
COMPLEXED PSA SERPL-MCNC: 2.1 NG/ML (ref 0–4)
ESTIMATED AVG GLUCOSE: 103 MG/DL (ref 68–131)
HBA1C MFR BLD HPLC: 5.2 % (ref 4–5.6)

## 2021-01-21 ENCOUNTER — OFFICE VISIT (OUTPATIENT)
Dept: FAMILY MEDICINE | Facility: CLINIC | Age: 66
End: 2021-01-21
Payer: COMMERCIAL

## 2021-01-21 VITALS
BODY MASS INDEX: 22.9 KG/M2 | WEIGHT: 159.94 LBS | RESPIRATION RATE: 18 BRPM | HEIGHT: 70 IN | OXYGEN SATURATION: 98 % | HEART RATE: 70 BPM | DIASTOLIC BLOOD PRESSURE: 82 MMHG | SYSTOLIC BLOOD PRESSURE: 138 MMHG | TEMPERATURE: 97 F

## 2021-01-21 DIAGNOSIS — I10 ESSENTIAL HYPERTENSION: Chronic | ICD-10-CM

## 2021-01-21 DIAGNOSIS — E78.5 DYSLIPIDEMIA: Primary | ICD-10-CM

## 2021-01-21 PROCEDURE — 1126F AMNT PAIN NOTED NONE PRSNT: CPT | Mod: S$GLB,,, | Performed by: INTERNAL MEDICINE

## 2021-01-21 PROCEDURE — 99999 PR PBB SHADOW E&M-EST. PATIENT-LVL III: CPT | Mod: PBBFAC,,, | Performed by: INTERNAL MEDICINE

## 2021-01-21 PROCEDURE — 3079F PR MOST RECENT DIASTOLIC BLOOD PRESSURE 80-89 MM HG: ICD-10-PCS | Mod: CPTII,S$GLB,, | Performed by: INTERNAL MEDICINE

## 2021-01-21 PROCEDURE — 99999 PR PBB SHADOW E&M-EST. PATIENT-LVL III: ICD-10-PCS | Mod: PBBFAC,,, | Performed by: INTERNAL MEDICINE

## 2021-01-21 PROCEDURE — 99397 PER PM REEVAL EST PAT 65+ YR: CPT | Mod: S$GLB,,, | Performed by: INTERNAL MEDICINE

## 2021-01-21 PROCEDURE — 3075F SYST BP GE 130 - 139MM HG: CPT | Mod: CPTII,S$GLB,, | Performed by: INTERNAL MEDICINE

## 2021-01-21 PROCEDURE — 3288F FALL RISK ASSESSMENT DOCD: CPT | Mod: CPTII,S$GLB,, | Performed by: INTERNAL MEDICINE

## 2021-01-21 PROCEDURE — 1126F PR PAIN SEVERITY QUANTIFIED, NO PAIN PRESENT: ICD-10-PCS | Mod: S$GLB,,, | Performed by: INTERNAL MEDICINE

## 2021-01-21 PROCEDURE — 3008F BODY MASS INDEX DOCD: CPT | Mod: CPTII,S$GLB,, | Performed by: INTERNAL MEDICINE

## 2021-01-21 PROCEDURE — 3288F PR FALLS RISK ASSESSMENT DOCUMENTED: ICD-10-PCS | Mod: CPTII,S$GLB,, | Performed by: INTERNAL MEDICINE

## 2021-01-21 PROCEDURE — 3079F DIAST BP 80-89 MM HG: CPT | Mod: CPTII,S$GLB,, | Performed by: INTERNAL MEDICINE

## 2021-01-21 PROCEDURE — 99397 PR PREVENTIVE VISIT,EST,65 & OVER: ICD-10-PCS | Mod: S$GLB,,, | Performed by: INTERNAL MEDICINE

## 2021-01-21 PROCEDURE — 1101F PT FALLS ASSESS-DOCD LE1/YR: CPT | Mod: CPTII,S$GLB,, | Performed by: INTERNAL MEDICINE

## 2021-01-21 PROCEDURE — 3075F PR MOST RECENT SYSTOLIC BLOOD PRESS GE 130-139MM HG: ICD-10-PCS | Mod: CPTII,S$GLB,, | Performed by: INTERNAL MEDICINE

## 2021-01-21 PROCEDURE — 1101F PR PT FALLS ASSESS DOC 0-1 FALLS W/OUT INJ PAST YR: ICD-10-PCS | Mod: CPTII,S$GLB,, | Performed by: INTERNAL MEDICINE

## 2021-01-21 PROCEDURE — 3008F PR BODY MASS INDEX (BMI) DOCUMENTED: ICD-10-PCS | Mod: CPTII,S$GLB,, | Performed by: INTERNAL MEDICINE

## 2021-04-28 ENCOUNTER — PATIENT MESSAGE (OUTPATIENT)
Dept: RESEARCH | Facility: HOSPITAL | Age: 66
End: 2021-04-28

## 2021-06-08 ENCOUNTER — OFFICE VISIT (OUTPATIENT)
Dept: FAMILY MEDICINE | Facility: CLINIC | Age: 66
End: 2021-06-08
Payer: COMMERCIAL

## 2021-06-08 ENCOUNTER — LAB VISIT (OUTPATIENT)
Dept: LAB | Facility: HOSPITAL | Age: 66
End: 2021-06-08
Attending: INTERNAL MEDICINE
Payer: COMMERCIAL

## 2021-06-08 VITALS
BODY MASS INDEX: 22.46 KG/M2 | OXYGEN SATURATION: 98 % | HEART RATE: 68 BPM | WEIGHT: 156.5 LBS | TEMPERATURE: 99 F | RESPIRATION RATE: 16 BRPM | DIASTOLIC BLOOD PRESSURE: 72 MMHG | SYSTOLIC BLOOD PRESSURE: 138 MMHG

## 2021-06-08 DIAGNOSIS — Z00.00 ROUTINE ADULT HEALTH MAINTENANCE: ICD-10-CM

## 2021-06-08 DIAGNOSIS — I10 ESSENTIAL HYPERTENSION: Primary | ICD-10-CM

## 2021-06-08 DIAGNOSIS — E78.5 DYSLIPIDEMIA: ICD-10-CM

## 2021-06-08 LAB
ALBUMIN SERPL BCP-MCNC: 4.1 G/DL (ref 3.5–5.2)
ALP SERPL-CCNC: 73 U/L (ref 55–135)
ALT SERPL W/O P-5'-P-CCNC: 15 U/L (ref 10–44)
ANION GAP SERPL CALC-SCNC: 13 MMOL/L (ref 8–16)
AST SERPL-CCNC: 21 U/L (ref 10–40)
BASOPHILS # BLD AUTO: 0.04 K/UL (ref 0–0.2)
BASOPHILS NFR BLD: 0.7 % (ref 0–1.9)
BILIRUB SERPL-MCNC: 0.7 MG/DL (ref 0.1–1)
BUN SERPL-MCNC: 15 MG/DL (ref 8–23)
CALCIUM SERPL-MCNC: 9.6 MG/DL (ref 8.7–10.5)
CHLORIDE SERPL-SCNC: 103 MMOL/L (ref 95–110)
CHOLEST SERPL-MCNC: 173 MG/DL (ref 120–199)
CHOLEST/HDLC SERPL: 2.9 {RATIO} (ref 2–5)
CO2 SERPL-SCNC: 27 MMOL/L (ref 23–29)
COMPLEXED PSA SERPL-MCNC: 3 NG/ML (ref 0–4)
CREAT SERPL-MCNC: 1.2 MG/DL (ref 0.5–1.4)
DIFFERENTIAL METHOD: ABNORMAL
EOSINOPHIL # BLD AUTO: 0.6 K/UL (ref 0–0.5)
EOSINOPHIL NFR BLD: 10.1 % (ref 0–8)
ERYTHROCYTE [DISTWIDTH] IN BLOOD BY AUTOMATED COUNT: 11.7 % (ref 11.5–14.5)
EST. GFR  (AFRICAN AMERICAN): >60 ML/MIN/1.73 M^2
EST. GFR  (NON AFRICAN AMERICAN): >60 ML/MIN/1.73 M^2
ESTIMATED AVG GLUCOSE: 108 MG/DL (ref 68–131)
GLUCOSE SERPL-MCNC: 85 MG/DL (ref 70–110)
HBA1C MFR BLD: 5.4 % (ref 4–5.6)
HCT VFR BLD AUTO: 43.5 % (ref 40–54)
HDLC SERPL-MCNC: 60 MG/DL (ref 40–75)
HDLC SERPL: 34.7 % (ref 20–50)
HGB BLD-MCNC: 14.5 G/DL (ref 14–18)
IMM GRANULOCYTES # BLD AUTO: 0.02 K/UL (ref 0–0.04)
IMM GRANULOCYTES NFR BLD AUTO: 0.3 % (ref 0–0.5)
LDLC SERPL CALC-MCNC: 102.6 MG/DL (ref 63–159)
LYMPHOCYTES # BLD AUTO: 1.2 K/UL (ref 1–4.8)
LYMPHOCYTES NFR BLD: 20.7 % (ref 18–48)
MCH RBC QN AUTO: 29.8 PG (ref 27–31)
MCHC RBC AUTO-ENTMCNC: 33.3 G/DL (ref 32–36)
MCV RBC AUTO: 90 FL (ref 82–98)
MONOCYTES # BLD AUTO: 0.4 K/UL (ref 0.3–1)
MONOCYTES NFR BLD: 6.7 % (ref 4–15)
NEUTROPHILS # BLD AUTO: 3.6 K/UL (ref 1.8–7.7)
NEUTROPHILS NFR BLD: 61.5 % (ref 38–73)
NONHDLC SERPL-MCNC: 113 MG/DL
NRBC BLD-RTO: 0 /100 WBC
PLATELET # BLD AUTO: 296 K/UL (ref 150–450)
PMV BLD AUTO: 11.1 FL (ref 9.2–12.9)
POTASSIUM SERPL-SCNC: 3.4 MMOL/L (ref 3.5–5.1)
PROT SERPL-MCNC: 7.6 G/DL (ref 6–8.4)
RBC # BLD AUTO: 4.86 M/UL (ref 4.6–6.2)
SODIUM SERPL-SCNC: 143 MMOL/L (ref 136–145)
TRIGL SERPL-MCNC: 52 MG/DL (ref 30–150)
WBC # BLD AUTO: 5.84 K/UL (ref 3.9–12.7)

## 2021-06-08 PROCEDURE — 3075F PR MOST RECENT SYSTOLIC BLOOD PRESS GE 130-139MM HG: ICD-10-PCS | Mod: CPTII,S$GLB,, | Performed by: INTERNAL MEDICINE

## 2021-06-08 PROCEDURE — 3078F PR MOST RECENT DIASTOLIC BLOOD PRESSURE < 80 MM HG: ICD-10-PCS | Mod: CPTII,S$GLB,, | Performed by: INTERNAL MEDICINE

## 2021-06-08 PROCEDURE — 3008F BODY MASS INDEX DOCD: CPT | Mod: CPTII,S$GLB,, | Performed by: INTERNAL MEDICINE

## 2021-06-08 PROCEDURE — 1126F PR PAIN SEVERITY QUANTIFIED, NO PAIN PRESENT: ICD-10-PCS | Mod: S$GLB,,, | Performed by: INTERNAL MEDICINE

## 2021-06-08 PROCEDURE — 3288F FALL RISK ASSESSMENT DOCD: CPT | Mod: CPTII,S$GLB,, | Performed by: INTERNAL MEDICINE

## 2021-06-08 PROCEDURE — 1101F PT FALLS ASSESS-DOCD LE1/YR: CPT | Mod: CPTII,S$GLB,, | Performed by: INTERNAL MEDICINE

## 2021-06-08 PROCEDURE — 99999 PR PBB SHADOW E&M-EST. PATIENT-LVL III: CPT | Mod: PBBFAC,,, | Performed by: INTERNAL MEDICINE

## 2021-06-08 PROCEDURE — 3288F PR FALLS RISK ASSESSMENT DOCUMENTED: ICD-10-PCS | Mod: CPTII,S$GLB,, | Performed by: INTERNAL MEDICINE

## 2021-06-08 PROCEDURE — 3078F DIAST BP <80 MM HG: CPT | Mod: CPTII,S$GLB,, | Performed by: INTERNAL MEDICINE

## 2021-06-08 PROCEDURE — 99397 PR PREVENTIVE VISIT,EST,65 & OVER: ICD-10-PCS | Mod: S$GLB,,, | Performed by: INTERNAL MEDICINE

## 2021-06-08 PROCEDURE — 83036 HEMOGLOBIN GLYCOSYLATED A1C: CPT | Performed by: INTERNAL MEDICINE

## 2021-06-08 PROCEDURE — 1101F PR PT FALLS ASSESS DOC 0-1 FALLS W/OUT INJ PAST YR: ICD-10-PCS | Mod: CPTII,S$GLB,, | Performed by: INTERNAL MEDICINE

## 2021-06-08 PROCEDURE — 36415 COLL VENOUS BLD VENIPUNCTURE: CPT | Mod: PO | Performed by: INTERNAL MEDICINE

## 2021-06-08 PROCEDURE — 85025 COMPLETE CBC W/AUTO DIFF WBC: CPT | Performed by: INTERNAL MEDICINE

## 2021-06-08 PROCEDURE — 1126F AMNT PAIN NOTED NONE PRSNT: CPT | Mod: S$GLB,,, | Performed by: INTERNAL MEDICINE

## 2021-06-08 PROCEDURE — 99999 PR PBB SHADOW E&M-EST. PATIENT-LVL III: ICD-10-PCS | Mod: PBBFAC,,, | Performed by: INTERNAL MEDICINE

## 2021-06-08 PROCEDURE — 80053 COMPREHEN METABOLIC PANEL: CPT | Performed by: INTERNAL MEDICINE

## 2021-06-08 PROCEDURE — 80061 LIPID PANEL: CPT | Performed by: INTERNAL MEDICINE

## 2021-06-08 PROCEDURE — 99397 PER PM REEVAL EST PAT 65+ YR: CPT | Mod: S$GLB,,, | Performed by: INTERNAL MEDICINE

## 2021-06-08 PROCEDURE — 84153 ASSAY OF PSA TOTAL: CPT | Performed by: INTERNAL MEDICINE

## 2021-06-08 PROCEDURE — 3075F SYST BP GE 130 - 139MM HG: CPT | Mod: CPTII,S$GLB,, | Performed by: INTERNAL MEDICINE

## 2021-06-08 PROCEDURE — 3008F PR BODY MASS INDEX (BMI) DOCUMENTED: ICD-10-PCS | Mod: CPTII,S$GLB,, | Performed by: INTERNAL MEDICINE

## 2021-06-21 ENCOUNTER — PATIENT MESSAGE (OUTPATIENT)
Dept: FAMILY MEDICINE | Facility: CLINIC | Age: 66
End: 2021-06-21

## 2021-06-27 ENCOUNTER — NURSE TRIAGE (OUTPATIENT)
Dept: ADMINISTRATIVE | Facility: CLINIC | Age: 66
End: 2021-06-27

## 2021-06-28 ENCOUNTER — HOSPITAL ENCOUNTER (OUTPATIENT)
Dept: RADIOLOGY | Facility: HOSPITAL | Age: 66
Discharge: HOME | End: 2021-06-28
Attending: FAMILY MEDICINE
Payer: COMMERCIAL

## 2021-06-28 ENCOUNTER — OFFICE VISIT (OUTPATIENT)
Dept: FAMILY MEDICINE | Facility: CLINIC | Age: 66
End: 2021-06-28
Payer: COMMERCIAL

## 2021-06-28 VITALS
HEART RATE: 83 BPM | DIASTOLIC BLOOD PRESSURE: 80 MMHG | SYSTOLIC BLOOD PRESSURE: 166 MMHG | TEMPERATURE: 100 F | WEIGHT: 155.63 LBS | BODY MASS INDEX: 22.28 KG/M2 | HEIGHT: 70 IN | OXYGEN SATURATION: 97 %

## 2021-06-28 DIAGNOSIS — R50.9 FEBRILE ILLNESS, ACUTE: Primary | ICD-10-CM

## 2021-06-28 DIAGNOSIS — R50.9 FEBRILE ILLNESS, ACUTE: ICD-10-CM

## 2021-06-28 DIAGNOSIS — J18.9 PNEUMONIA OF LEFT LOWER LOBE DUE TO INFECTIOUS ORGANISM: ICD-10-CM

## 2021-06-28 DIAGNOSIS — R53.1 WEAKNESS: ICD-10-CM

## 2021-06-28 LAB
BILIRUB SERPL-MCNC: NORMAL MG/DL
BLOOD URINE, POC: 250
CLARITY, POC UA: CLEAR
COLOR, POC UA: NORMAL
GLUCOSE UR QL STRIP: NORMAL
KETONES UR QL STRIP: NORMAL
LEUKOCYTE ESTERASE URINE, POC: NORMAL
NITRITE, POC UA: NORMAL
PH, POC UA: 5
PROTEIN, POC: NORMAL
SPECIFIC GRAVITY, POC UA: 1.02
UROBILINOGEN, POC UA: NORMAL

## 2021-06-28 PROCEDURE — 1159F PR MEDICATION LIST DOCUMENTED IN MEDICAL RECORD: ICD-10-PCS | Mod: S$GLB,,, | Performed by: FAMILY MEDICINE

## 2021-06-28 PROCEDURE — 3008F PR BODY MASS INDEX (BMI) DOCUMENTED: ICD-10-PCS | Mod: CPTII,S$GLB,, | Performed by: FAMILY MEDICINE

## 2021-06-28 PROCEDURE — 71046 X-RAY EXAM CHEST 2 VIEWS: CPT | Mod: TC,FY,PO

## 2021-06-28 PROCEDURE — 81002 URINALYSIS NONAUTO W/O SCOPE: CPT | Mod: S$GLB,,, | Performed by: FAMILY MEDICINE

## 2021-06-28 PROCEDURE — 3008F BODY MASS INDEX DOCD: CPT | Mod: CPTII,S$GLB,, | Performed by: FAMILY MEDICINE

## 2021-06-28 PROCEDURE — 99214 OFFICE O/P EST MOD 30 MIN: CPT | Mod: 25,S$GLB,, | Performed by: FAMILY MEDICINE

## 2021-06-28 PROCEDURE — 71046 XR CHEST PA AND LATERAL: ICD-10-PCS | Mod: 26,,, | Performed by: RADIOLOGY

## 2021-06-28 PROCEDURE — 1126F AMNT PAIN NOTED NONE PRSNT: CPT | Mod: S$GLB,,, | Performed by: FAMILY MEDICINE

## 2021-06-28 PROCEDURE — 3288F PR FALLS RISK ASSESSMENT DOCUMENTED: ICD-10-PCS | Mod: CPTII,S$GLB,, | Performed by: FAMILY MEDICINE

## 2021-06-28 PROCEDURE — 3288F FALL RISK ASSESSMENT DOCD: CPT | Mod: CPTII,S$GLB,, | Performed by: FAMILY MEDICINE

## 2021-06-28 PROCEDURE — 99214 PR OFFICE/OUTPT VISIT, EST, LEVL IV, 30-39 MIN: ICD-10-PCS | Mod: 25,S$GLB,, | Performed by: FAMILY MEDICINE

## 2021-06-28 PROCEDURE — 96372 THER/PROPH/DIAG INJ SC/IM: CPT | Mod: S$GLB,,, | Performed by: FAMILY MEDICINE

## 2021-06-28 PROCEDURE — 87086 URINE CULTURE/COLONY COUNT: CPT | Performed by: FAMILY MEDICINE

## 2021-06-28 PROCEDURE — 96372 PR INJECTION,THERAP/PROPH/DIAG2ST, IM OR SUBCUT: ICD-10-PCS | Mod: S$GLB,,, | Performed by: FAMILY MEDICINE

## 2021-06-28 PROCEDURE — 71046 X-RAY EXAM CHEST 2 VIEWS: CPT | Mod: 26,,, | Performed by: RADIOLOGY

## 2021-06-28 PROCEDURE — U0003 INFECTIOUS AGENT DETECTION BY NUCLEIC ACID (DNA OR RNA); SEVERE ACUTE RESPIRATORY SYNDROME CORONAVIRUS 2 (SARS-COV-2) (CORONAVIRUS DISEASE [COVID-19]), AMPLIFIED PROBE TECHNIQUE, MAKING USE OF HIGH THROUGHPUT TECHNOLOGIES AS DESCRIBED BY CMS-2020-01-R: HCPCS | Performed by: FAMILY MEDICINE

## 2021-06-28 PROCEDURE — 1126F PR PAIN SEVERITY QUANTIFIED, NO PAIN PRESENT: ICD-10-PCS | Mod: S$GLB,,, | Performed by: FAMILY MEDICINE

## 2021-06-28 PROCEDURE — 1159F MED LIST DOCD IN RCRD: CPT | Mod: S$GLB,,, | Performed by: FAMILY MEDICINE

## 2021-06-28 PROCEDURE — 1101F PT FALLS ASSESS-DOCD LE1/YR: CPT | Mod: CPTII,S$GLB,, | Performed by: FAMILY MEDICINE

## 2021-06-28 PROCEDURE — 81002 POCT URINE DIPSTICK WITHOUT MICROSCOPE: ICD-10-PCS | Mod: S$GLB,,, | Performed by: FAMILY MEDICINE

## 2021-06-28 PROCEDURE — 99999 PR PBB SHADOW E&M-EST. PATIENT-LVL IV: ICD-10-PCS | Mod: PBBFAC,,, | Performed by: FAMILY MEDICINE

## 2021-06-28 PROCEDURE — U0005 INFEC AGEN DETEC AMPLI PROBE: HCPCS | Performed by: FAMILY MEDICINE

## 2021-06-28 PROCEDURE — 99999 PR PBB SHADOW E&M-EST. PATIENT-LVL IV: CPT | Mod: PBBFAC,,, | Performed by: FAMILY MEDICINE

## 2021-06-28 PROCEDURE — 1101F PR PT FALLS ASSESS DOC 0-1 FALLS W/OUT INJ PAST YR: ICD-10-PCS | Mod: CPTII,S$GLB,, | Performed by: FAMILY MEDICINE

## 2021-06-28 RX ORDER — CEFTRIAXONE 1 G/1
1 INJECTION, POWDER, FOR SOLUTION INTRAMUSCULAR; INTRAVENOUS
Status: COMPLETED | OUTPATIENT
Start: 2021-06-28 | End: 2021-06-28

## 2021-06-28 RX ORDER — DOXYCYCLINE 100 MG/1
100 CAPSULE ORAL 2 TIMES DAILY
Qty: 20 CAPSULE | Refills: 0 | Status: SHIPPED | OUTPATIENT
Start: 2021-06-28 | End: 2022-02-07

## 2021-06-28 RX ADMIN — CEFTRIAXONE 1 G: 1 INJECTION, POWDER, FOR SOLUTION INTRAMUSCULAR; INTRAVENOUS at 12:06

## 2021-06-29 LAB
BACTERIA UR CULT: NO GROWTH
SARS-COV-2 RNA RESP QL NAA+PROBE: NOT DETECTED

## 2021-07-28 ENCOUNTER — PATIENT OUTREACH (OUTPATIENT)
Dept: ADMINISTRATIVE | Facility: HOSPITAL | Age: 66
End: 2021-07-28

## 2021-10-05 ENCOUNTER — HOSPITAL ENCOUNTER (OUTPATIENT)
Dept: RADIOLOGY | Facility: CLINIC | Age: 66
Discharge: HOME | End: 2021-10-05
Attending: PHYSICIAN ASSISTANT
Payer: COMMERCIAL

## 2021-10-05 ENCOUNTER — OFFICE VISIT (OUTPATIENT)
Dept: URGENT CARE | Facility: CLINIC | Age: 66
End: 2021-10-05
Payer: COMMERCIAL

## 2021-10-05 ENCOUNTER — OFFICE VISIT (OUTPATIENT)
Dept: FAMILY MEDICINE | Facility: CLINIC | Age: 66
End: 2021-10-05
Payer: COMMERCIAL

## 2021-10-05 VITALS
RESPIRATION RATE: 16 BRPM | HEIGHT: 70 IN | TEMPERATURE: 98 F | DIASTOLIC BLOOD PRESSURE: 73 MMHG | SYSTOLIC BLOOD PRESSURE: 161 MMHG | OXYGEN SATURATION: 99 % | BODY MASS INDEX: 22.19 KG/M2 | HEART RATE: 99 BPM | WEIGHT: 155 LBS

## 2021-10-05 DIAGNOSIS — R53.83 FATIGUE, UNSPECIFIED TYPE: Primary | ICD-10-CM

## 2021-10-05 DIAGNOSIS — R53.83 FATIGUE, UNSPECIFIED TYPE: ICD-10-CM

## 2021-10-05 LAB
CTP QC/QA: YES
SARS-COV-2 RDRP RESP QL NAA+PROBE: NEGATIVE

## 2021-10-05 PROCEDURE — 1160F RVW MEDS BY RX/DR IN RCRD: CPT | Mod: CPTII,S$GLB,, | Performed by: PHYSICIAN ASSISTANT

## 2021-10-05 PROCEDURE — 4010F ACE/ARB THERAPY RXD/TAKEN: CPT | Mod: CPTII,95,, | Performed by: INTERNAL MEDICINE

## 2021-10-05 PROCEDURE — 3044F HG A1C LEVEL LT 7.0%: CPT | Mod: CPTII,95,, | Performed by: INTERNAL MEDICINE

## 2021-10-05 PROCEDURE — 99212 PR OFFICE/OUTPT VISIT, EST, LEVL II, 10-19 MIN: ICD-10-PCS | Mod: 95,,, | Performed by: INTERNAL MEDICINE

## 2021-10-05 PROCEDURE — 3077F SYST BP >= 140 MM HG: CPT | Mod: CPTII,S$GLB,, | Performed by: PHYSICIAN ASSISTANT

## 2021-10-05 PROCEDURE — 3044F HG A1C LEVEL LT 7.0%: CPT | Mod: CPTII,S$GLB,, | Performed by: PHYSICIAN ASSISTANT

## 2021-10-05 PROCEDURE — 99213 PR OFFICE/OUTPT VISIT, EST, LEVL III, 20-29 MIN: ICD-10-PCS | Mod: S$GLB,CS,, | Performed by: PHYSICIAN ASSISTANT

## 2021-10-05 PROCEDURE — 4010F PR ACE/ARB THEARPY RXD/TAKEN: ICD-10-PCS | Mod: CPTII,95,, | Performed by: INTERNAL MEDICINE

## 2021-10-05 PROCEDURE — 71046 X-RAY EXAM CHEST 2 VIEWS: CPT | Mod: S$GLB,,, | Performed by: RADIOLOGY

## 2021-10-05 PROCEDURE — 99212 OFFICE O/P EST SF 10 MIN: CPT | Mod: 95,,, | Performed by: INTERNAL MEDICINE

## 2021-10-05 PROCEDURE — 4010F ACE/ARB THERAPY RXD/TAKEN: CPT | Mod: CPTII,S$GLB,, | Performed by: PHYSICIAN ASSISTANT

## 2021-10-05 PROCEDURE — 3078F PR MOST RECENT DIASTOLIC BLOOD PRESSURE < 80 MM HG: ICD-10-PCS | Mod: CPTII,S$GLB,, | Performed by: PHYSICIAN ASSISTANT

## 2021-10-05 PROCEDURE — 99213 OFFICE O/P EST LOW 20 MIN: CPT | Mod: S$GLB,CS,, | Performed by: PHYSICIAN ASSISTANT

## 2021-10-05 PROCEDURE — 3008F PR BODY MASS INDEX (BMI) DOCUMENTED: ICD-10-PCS | Mod: CPTII,S$GLB,, | Performed by: PHYSICIAN ASSISTANT

## 2021-10-05 PROCEDURE — U0002 COVID-19 LAB TEST NON-CDC: HCPCS | Mod: QW,S$GLB,, | Performed by: PHYSICIAN ASSISTANT

## 2021-10-05 PROCEDURE — 71046 XR CHEST PA AND LATERAL: ICD-10-PCS | Mod: S$GLB,,, | Performed by: RADIOLOGY

## 2021-10-05 PROCEDURE — 3078F DIAST BP <80 MM HG: CPT | Mod: CPTII,S$GLB,, | Performed by: PHYSICIAN ASSISTANT

## 2021-10-05 PROCEDURE — 1159F MED LIST DOCD IN RCRD: CPT | Mod: CPTII,S$GLB,, | Performed by: PHYSICIAN ASSISTANT

## 2021-10-05 PROCEDURE — 1159F PR MEDICATION LIST DOCUMENTED IN MEDICAL RECORD: ICD-10-PCS | Mod: CPTII,S$GLB,, | Performed by: PHYSICIAN ASSISTANT

## 2021-10-05 PROCEDURE — 1160F PR REVIEW ALL MEDS BY PRESCRIBER/CLIN PHARMACIST DOCUMENTED: ICD-10-PCS | Mod: CPTII,S$GLB,, | Performed by: PHYSICIAN ASSISTANT

## 2021-10-05 PROCEDURE — 3008F BODY MASS INDEX DOCD: CPT | Mod: CPTII,S$GLB,, | Performed by: PHYSICIAN ASSISTANT

## 2021-10-05 PROCEDURE — 4010F PR ACE/ARB THEARPY RXD/TAKEN: ICD-10-PCS | Mod: CPTII,S$GLB,, | Performed by: PHYSICIAN ASSISTANT

## 2021-10-05 PROCEDURE — 3044F PR MOST RECENT HEMOGLOBIN A1C LEVEL <7.0%: ICD-10-PCS | Mod: CPTII,S$GLB,, | Performed by: PHYSICIAN ASSISTANT

## 2021-10-05 PROCEDURE — 3077F PR MOST RECENT SYSTOLIC BLOOD PRESSURE >= 140 MM HG: ICD-10-PCS | Mod: CPTII,S$GLB,, | Performed by: PHYSICIAN ASSISTANT

## 2021-10-05 PROCEDURE — 3044F PR MOST RECENT HEMOGLOBIN A1C LEVEL <7.0%: ICD-10-PCS | Mod: CPTII,95,, | Performed by: INTERNAL MEDICINE

## 2021-10-05 PROCEDURE — U0002: ICD-10-PCS | Mod: QW,S$GLB,, | Performed by: PHYSICIAN ASSISTANT

## 2021-10-08 ENCOUNTER — TELEPHONE (OUTPATIENT)
Dept: URGENT CARE | Facility: CLINIC | Age: 66
End: 2021-10-08

## 2022-02-07 ENCOUNTER — OFFICE VISIT (OUTPATIENT)
Dept: URGENT CARE | Facility: CLINIC | Age: 67
End: 2022-02-07
Payer: COMMERCIAL

## 2022-02-07 ENCOUNTER — TELEPHONE (OUTPATIENT)
Dept: URGENT CARE | Facility: CLINIC | Age: 67
End: 2022-02-07
Payer: COMMERCIAL

## 2022-02-07 VITALS
DIASTOLIC BLOOD PRESSURE: 79 MMHG | HEIGHT: 70 IN | WEIGHT: 155 LBS | OXYGEN SATURATION: 98 % | HEART RATE: 72 BPM | TEMPERATURE: 99 F | BODY MASS INDEX: 22.19 KG/M2 | SYSTOLIC BLOOD PRESSURE: 168 MMHG | RESPIRATION RATE: 16 BRPM

## 2022-02-07 DIAGNOSIS — R53.83 FATIGUE, UNSPECIFIED TYPE: Primary | ICD-10-CM

## 2022-02-07 LAB
CTP QC/QA: YES
SARS-COV-2 RDRP RESP QL NAA+PROBE: NEGATIVE

## 2022-02-07 PROCEDURE — 71046 X-RAY EXAM CHEST 2 VIEWS: CPT | Mod: S$GLB,,, | Performed by: RADIOLOGY

## 2022-02-07 PROCEDURE — 3078F DIAST BP <80 MM HG: CPT | Mod: CPTII,S$GLB,, | Performed by: PHYSICIAN ASSISTANT

## 2022-02-07 PROCEDURE — 1126F PR PAIN SEVERITY QUANTIFIED, NO PAIN PRESENT: ICD-10-PCS | Mod: CPTII,S$GLB,, | Performed by: PHYSICIAN ASSISTANT

## 2022-02-07 PROCEDURE — 1126F AMNT PAIN NOTED NONE PRSNT: CPT | Mod: CPTII,S$GLB,, | Performed by: PHYSICIAN ASSISTANT

## 2022-02-07 PROCEDURE — 1160F RVW MEDS BY RX/DR IN RCRD: CPT | Mod: CPTII,S$GLB,, | Performed by: PHYSICIAN ASSISTANT

## 2022-02-07 PROCEDURE — 1159F PR MEDICATION LIST DOCUMENTED IN MEDICAL RECORD: ICD-10-PCS | Mod: CPTII,S$GLB,, | Performed by: PHYSICIAN ASSISTANT

## 2022-02-07 PROCEDURE — 3008F BODY MASS INDEX DOCD: CPT | Mod: CPTII,S$GLB,, | Performed by: PHYSICIAN ASSISTANT

## 2022-02-07 PROCEDURE — 99213 OFFICE O/P EST LOW 20 MIN: CPT | Mod: S$GLB,CS,, | Performed by: PHYSICIAN ASSISTANT

## 2022-02-07 PROCEDURE — 3078F PR MOST RECENT DIASTOLIC BLOOD PRESSURE < 80 MM HG: ICD-10-PCS | Mod: CPTII,S$GLB,, | Performed by: PHYSICIAN ASSISTANT

## 2022-02-07 PROCEDURE — 1160F PR REVIEW ALL MEDS BY PRESCRIBER/CLIN PHARMACIST DOCUMENTED: ICD-10-PCS | Mod: CPTII,S$GLB,, | Performed by: PHYSICIAN ASSISTANT

## 2022-02-07 PROCEDURE — U0002 COVID-19 LAB TEST NON-CDC: HCPCS | Mod: QW,S$GLB,, | Performed by: PHYSICIAN ASSISTANT

## 2022-02-07 PROCEDURE — 71046 XR CHEST PA AND LATERAL: ICD-10-PCS | Mod: S$GLB,,, | Performed by: RADIOLOGY

## 2022-02-07 PROCEDURE — 99213 PR OFFICE/OUTPT VISIT, EST, LEVL III, 20-29 MIN: ICD-10-PCS | Mod: S$GLB,CS,, | Performed by: PHYSICIAN ASSISTANT

## 2022-02-07 PROCEDURE — 3008F PR BODY MASS INDEX (BMI) DOCUMENTED: ICD-10-PCS | Mod: CPTII,S$GLB,, | Performed by: PHYSICIAN ASSISTANT

## 2022-02-07 PROCEDURE — 3077F PR MOST RECENT SYSTOLIC BLOOD PRESSURE >= 140 MM HG: ICD-10-PCS | Mod: CPTII,S$GLB,, | Performed by: PHYSICIAN ASSISTANT

## 2022-02-07 PROCEDURE — 3077F SYST BP >= 140 MM HG: CPT | Mod: CPTII,S$GLB,, | Performed by: PHYSICIAN ASSISTANT

## 2022-02-07 PROCEDURE — U0002: ICD-10-PCS | Mod: QW,S$GLB,, | Performed by: PHYSICIAN ASSISTANT

## 2022-02-07 PROCEDURE — 1159F MED LIST DOCD IN RCRD: CPT | Mod: CPTII,S$GLB,, | Performed by: PHYSICIAN ASSISTANT

## 2022-02-07 NOTE — PROGRESS NOTES
"Subjective:       Patient ID: Jose Long is a 66 y.o. male.    Vitals:  height is 5' 10" (1.778 m) and weight is 70.3 kg (155 lb). His temperature is 98.7 °F (37.1 °C). His blood pressure is 168/79 (abnormal) and his pulse is 72. His respiration is 16 and oxygen saturation is 98%.     Chief Complaint: No chief complaint on file.    Other  This is a new problem. The current episode started in the past 7 days (Onset three days). The problem occurs constantly. The problem has been gradually worsening. Associated symptoms include chills, congestion (nothing unusual), coughing (minimal dry cough), fatigue and myalgias. Pertinent negatives include no abdominal pain, change in bowel habit, chest pain, fever, headaches, nausea, sore throat or vomiting. Nothing aggravates the symptoms. Treatments tried: Tylenol, Zyrtec. The treatment provided mild relief.       Constitution: Positive for chills and fatigue. Negative for fever.   HENT: Positive for congestion (nothing unusual). Negative for sore throat.    Cardiovascular: Negative for chest pain.   Respiratory: Positive for cough (minimal dry cough).    Gastrointestinal: Negative for abdominal pain, nausea and vomiting.   Musculoskeletal: Positive for muscle ache.   Neurological: Negative for headaches.       Objective:      Physical Exam   Constitutional: He is oriented to person, place, and time. He appears well-developed and well-nourished. He is cooperative.  Non-toxic appearance. He does not have a sickly appearance. He does not appear ill. No distress.   HENT:   Head: Normocephalic and atraumatic.   Ears:   Right Ear: Hearing and external ear normal.   Left Ear: Hearing and external ear normal.   Nose: Rhinorrhea present. No mucosal edema or nasal deformity. No epistaxis. Right sinus exhibits no maxillary sinus tenderness and no frontal sinus tenderness. Left sinus exhibits no maxillary sinus tenderness and no frontal sinus tenderness.   Mouth/Throat: Uvula is " midline, oropharynx is clear and moist and mucous membranes are normal. No trismus in the jaw. Normal dentition. No uvula swelling. No oropharyngeal exudate, posterior oropharyngeal edema or posterior oropharyngeal erythema.   Eyes: Conjunctivae and lids are normal. No scleral icterus.   Neck: Trachea normal and phonation normal. Neck supple. No edema present. No erythema present. No neck rigidity present.   Cardiovascular: Normal rate, regular rhythm, normal heart sounds, intact distal pulses and normal pulses.   Pulmonary/Chest: Effort normal and breath sounds normal. No stridor. No respiratory distress. He has no decreased breath sounds. He has no wheezes. He has no rhonchi.   Abdominal: Normal appearance.   Musculoskeletal: Normal range of motion.         General: No deformity or edema. Normal range of motion.   Neurological: He is alert and oriented to person, place, and time. He exhibits normal muscle tone. Coordination normal.   Skin: Skin is warm, dry, intact, not diaphoretic and not pale.   Psychiatric: He has a normal mood and affect. His speech is normal and behavior is normal. Judgment and thought content normal. Cognition and memory  Nursing note and vitals reviewed.        Assessment:       1. Fatigue, unspecified type        Here with fatigue for the last few days and sinus congestion. He was tested for covid and it was negative. He reports that he felt this way when he had pneumonia last year. Chest xray was performed. I personally reviewed the images and compared them to prior xray done on 10/22 and there is no sign of acute infiltrate at this time. He was instructed to follow up with PCP to have further testing done. I instructed him to return if any new or worsening symptoms occurred.     Radiologist agreed with no read of no acute findings on chest xray.    Plan:         Fatigue, unspecified type  -     POCT COVID-19 Rapid Screening  -     XR CHEST PA AND LATERAL; Future; Expected date:  02/07/2022

## 2022-02-07 NOTE — PATIENT INSTRUCTIONS
Patient Education       Fatigue Discharge Instructions   About this topic   Fatigue is a strong feeling of being tired and weak. This often happens after doing activities that are very hard to do. Then, you don't have much energy to do other things. Just as your body can be fatigued, your mind can as well. You might have trouble being able to think clearly about things. Some people have little desire to do anything. Fatigue is normal when you have had physical and mental activity. Stress can also cause fatigue. Other causes of fatigue can be the flu or other health problems, drugs, or sleep problems.  Fatigue can also be a sign of a more serious problem. Some of these are:  · Low red blood cells  · Anxiety or worrying too much  · Low mood  · Always being in pain  · Problems with your thyroid, liver, or kidneys  · Drug or alcohol use  · Health problems like cancer, arthritis, and heart or lung disease  Most of the time, fatigue will get better after a few days of rest.     What care is needed at home?   · Ask your doctor what you need to do when you go home. Make sure you ask questions if you do not understand what the doctor says. This way you will know what to do.  · Try to get at least 8 hours of sleep at night. Taking a short nap or resting during the day may help. But this can also make it hard to get a good night's sleep. If you take several naps during the day, you may want to not take as many to see if you sleep better at night.  · Try doing some light exercise each day. This may help give you more energy. You may want to avoid activities that need a lot of energy. Try to find the right amount for you.  · Ask your doctor or dietitian for foods that will help you get back your strength and energy. Don't eat foods that have a lot of sugar.  · Try relaxation and breathing exercises. Things like yoga, imagery, and music therapy can also help ease stress.  What follow-up care is needed?   · Your doctor may ask you  to make visits to the office to check on your progress. Be sure to keep these visits.  · You may have some tests to help the doctor find causes for your fatigue.  · If your fatigue is due to mental stress, your doctor may want you to see a psychologist or counselor to help you with your worries and anxiety.  What drugs may be needed?   The doctor may order drugs to:  · Give extra vitamins and minerals  · Treat the problem that causes the fatigue  Will physical activity be limited?   Physical activities may be limited until you are feeling better. Go back to your normal activities a little bit at a time.  What problems could happen?   · Body's normal defenses or immune system are lowered  · Not able to do the things you often do  · Problems with sex  · Not feeling hungry  · Not being able to act as fast or do things as well. This could cause accidents when driving, at work, or at home.  · Headaches, feeling angry, and not able to think clearly about things. These could cause you to not make good decisions.  · Trouble with your memory or concentration  · Having problems walking and exercising  · Falling asleep during the day  · Having problems seeing or seeing things that are not really there  · Feeling like not doing things  When do I need to call the doctor?   · Get help right away if you have any sudden changes with your body such as weakness, numbness, pain, or breathing problems.  · You faint or pass out.  · You are too weak to walk and do daily activities.  · You are not feeling better in 2 to 3 days or you are feeling worse  Teach Back: Helping You Understand   The Teach Back Method helps you understand the information we are giving you. After you talk with the staff, tell them in your own words what you learned. This helps to make sure the staff has described each thing clearly. It also helps to explain things that may have been confusing. Before going home, make sure you can do these:  · I can tell you about  my condition.  · I can tell you ways to help my fatigue.  · I can tell you what I will do if I have weakness, numbness, pain, or trouble breathing.  Where can I learn more?   Centers for Disease Control and Prevention  https://www.cdc.gov/me-cfs/about/index.html   National [place] on Aging  https://www.massimo.nih.gov/health/fatigue-older-adults   NHS Choices  http://www.nhs.uk/livewell/tiredness-and-fatigue/pages/tiredness-and-fatigue.aspx   Last Reviewed Date   2021-02-24  Consumer Information Use and Disclaimer   This information is not specific medical advice and does not replace information you receive from your health care provider. This is only a brief summary of general information. It does NOT include all information about conditions, illnesses, injuries, tests, procedures, treatments, therapies, discharge instructions or life-style choices that may apply to you. You must talk with your health care provider for complete information about your health and treatment options. This information should not be used to decide whether or not to accept your health care providers advice, instructions or recommendations. Only your health care provider has the knowledge and training to provide advice that is right for you.  Copyright   Copyright © 2021 [employer] and its affiliates and/or licensors. All rights reserved.

## 2022-02-07 NOTE — TELEPHONE ENCOUNTER
I spoke with patient in regards to chest xray being negative for signs of infection. He has no further questions.

## 2022-02-11 ENCOUNTER — TELEPHONE (OUTPATIENT)
Dept: URGENT CARE | Facility: CLINIC | Age: 67
End: 2022-02-11
Payer: COMMERCIAL

## 2022-03-07 ENCOUNTER — OFFICE VISIT (OUTPATIENT)
Dept: FAMILY MEDICINE | Facility: CLINIC | Age: 67
End: 2022-03-07
Payer: COMMERCIAL

## 2022-03-07 ENCOUNTER — LAB VISIT (OUTPATIENT)
Dept: LAB | Facility: HOSPITAL | Age: 67
End: 2022-03-07
Attending: INTERNAL MEDICINE
Payer: COMMERCIAL

## 2022-03-07 VITALS
HEART RATE: 73 BPM | OXYGEN SATURATION: 99 % | BODY MASS INDEX: 22.4 KG/M2 | DIASTOLIC BLOOD PRESSURE: 80 MMHG | TEMPERATURE: 98 F | RESPIRATION RATE: 16 BRPM | SYSTOLIC BLOOD PRESSURE: 130 MMHG | WEIGHT: 156.06 LBS

## 2022-03-07 DIAGNOSIS — I10 PRIMARY HYPERTENSION: Chronic | ICD-10-CM

## 2022-03-07 DIAGNOSIS — R41.3 MEMORY CHANGES: ICD-10-CM

## 2022-03-07 DIAGNOSIS — E78.5 DYSLIPIDEMIA: ICD-10-CM

## 2022-03-07 DIAGNOSIS — I10 PRIMARY HYPERTENSION: Primary | Chronic | ICD-10-CM

## 2022-03-07 LAB
ALBUMIN SERPL BCP-MCNC: 4.2 G/DL (ref 3.5–5.2)
ALP SERPL-CCNC: 60 U/L (ref 55–135)
ALT SERPL W/O P-5'-P-CCNC: 29 U/L (ref 10–44)
ANION GAP SERPL CALC-SCNC: 15 MMOL/L (ref 8–16)
AST SERPL-CCNC: 28 U/L (ref 10–40)
BASOPHILS # BLD AUTO: 0.04 K/UL (ref 0–0.2)
BASOPHILS NFR BLD: 0.8 % (ref 0–1.9)
BILIRUB SERPL-MCNC: 0.7 MG/DL (ref 0.1–1)
BUN SERPL-MCNC: 14 MG/DL (ref 8–23)
CALCIUM SERPL-MCNC: 9.4 MG/DL (ref 8.7–10.5)
CHLORIDE SERPL-SCNC: 101 MMOL/L (ref 95–110)
CO2 SERPL-SCNC: 29 MMOL/L (ref 23–29)
CREAT SERPL-MCNC: 1.3 MG/DL (ref 0.5–1.4)
DIFFERENTIAL METHOD: NORMAL
EOSINOPHIL # BLD AUTO: 0.3 K/UL (ref 0–0.5)
EOSINOPHIL NFR BLD: 6.3 % (ref 0–8)
ERYTHROCYTE [DISTWIDTH] IN BLOOD BY AUTOMATED COUNT: 12 % (ref 11.5–14.5)
EST. GFR  (AFRICAN AMERICAN): >60 ML/MIN/1.73 M^2
EST. GFR  (NON AFRICAN AMERICAN): 56.1 ML/MIN/1.73 M^2
GLUCOSE SERPL-MCNC: 90 MG/DL (ref 70–110)
HCT VFR BLD AUTO: 42.8 % (ref 40–54)
HGB BLD-MCNC: 14.3 G/DL (ref 14–18)
IMM GRANULOCYTES # BLD AUTO: 0.01 K/UL (ref 0–0.04)
IMM GRANULOCYTES NFR BLD AUTO: 0.2 % (ref 0–0.5)
LYMPHOCYTES # BLD AUTO: 1.3 K/UL (ref 1–4.8)
LYMPHOCYTES NFR BLD: 23.9 % (ref 18–48)
MCH RBC QN AUTO: 30.3 PG (ref 27–31)
MCHC RBC AUTO-ENTMCNC: 33.4 G/DL (ref 32–36)
MCV RBC AUTO: 91 FL (ref 82–98)
MONOCYTES # BLD AUTO: 0.4 K/UL (ref 0.3–1)
MONOCYTES NFR BLD: 8.3 % (ref 4–15)
NEUTROPHILS # BLD AUTO: 3.2 K/UL (ref 1.8–7.7)
NEUTROPHILS NFR BLD: 60.5 % (ref 38–73)
NRBC BLD-RTO: 0 /100 WBC
PLATELET # BLD AUTO: 286 K/UL (ref 150–450)
PMV BLD AUTO: 10.8 FL (ref 9.2–12.9)
POTASSIUM SERPL-SCNC: 3.5 MMOL/L (ref 3.5–5.1)
PROT SERPL-MCNC: 7.4 G/DL (ref 6–8.4)
RBC # BLD AUTO: 4.72 M/UL (ref 4.6–6.2)
SODIUM SERPL-SCNC: 145 MMOL/L (ref 136–145)
TSH SERPL DL<=0.005 MIU/L-ACNC: 1.14 UIU/ML (ref 0.4–4)
VIT B12 SERPL-MCNC: 496 PG/ML (ref 210–950)
WBC # BLD AUTO: 5.27 K/UL (ref 3.9–12.7)

## 2022-03-07 PROCEDURE — 85025 COMPLETE CBC W/AUTO DIFF WBC: CPT | Performed by: INTERNAL MEDICINE

## 2022-03-07 PROCEDURE — 3079F DIAST BP 80-89 MM HG: CPT | Mod: CPTII,S$GLB,, | Performed by: INTERNAL MEDICINE

## 2022-03-07 PROCEDURE — 3008F BODY MASS INDEX DOCD: CPT | Mod: CPTII,S$GLB,, | Performed by: INTERNAL MEDICINE

## 2022-03-07 PROCEDURE — 3079F PR MOST RECENT DIASTOLIC BLOOD PRESSURE 80-89 MM HG: ICD-10-PCS | Mod: CPTII,S$GLB,, | Performed by: INTERNAL MEDICINE

## 2022-03-07 PROCEDURE — 1159F PR MEDICATION LIST DOCUMENTED IN MEDICAL RECORD: ICD-10-PCS | Mod: CPTII,S$GLB,, | Performed by: INTERNAL MEDICINE

## 2022-03-07 PROCEDURE — 1126F PR PAIN SEVERITY QUANTIFIED, NO PAIN PRESENT: ICD-10-PCS | Mod: CPTII,S$GLB,, | Performed by: INTERNAL MEDICINE

## 2022-03-07 PROCEDURE — 99397 PR PREVENTIVE VISIT,EST,65 & OVER: ICD-10-PCS | Mod: S$GLB,,, | Performed by: INTERNAL MEDICINE

## 2022-03-07 PROCEDURE — 99397 PER PM REEVAL EST PAT 65+ YR: CPT | Mod: S$GLB,,, | Performed by: INTERNAL MEDICINE

## 2022-03-07 PROCEDURE — 3008F PR BODY MASS INDEX (BMI) DOCUMENTED: ICD-10-PCS | Mod: CPTII,S$GLB,, | Performed by: INTERNAL MEDICINE

## 2022-03-07 PROCEDURE — 1126F AMNT PAIN NOTED NONE PRSNT: CPT | Mod: CPTII,S$GLB,, | Performed by: INTERNAL MEDICINE

## 2022-03-07 PROCEDURE — 84443 ASSAY THYROID STIM HORMONE: CPT | Performed by: INTERNAL MEDICINE

## 2022-03-07 PROCEDURE — 1101F PR PT FALLS ASSESS DOC 0-1 FALLS W/OUT INJ PAST YR: ICD-10-PCS | Mod: CPTII,S$GLB,, | Performed by: INTERNAL MEDICINE

## 2022-03-07 PROCEDURE — 99999 PR PBB SHADOW E&M-EST. PATIENT-LVL IV: ICD-10-PCS | Mod: PBBFAC,,, | Performed by: INTERNAL MEDICINE

## 2022-03-07 PROCEDURE — 4010F ACE/ARB THERAPY RXD/TAKEN: CPT | Mod: CPTII,S$GLB,, | Performed by: INTERNAL MEDICINE

## 2022-03-07 PROCEDURE — 80053 COMPREHEN METABOLIC PANEL: CPT | Performed by: INTERNAL MEDICINE

## 2022-03-07 PROCEDURE — 82607 VITAMIN B-12: CPT | Performed by: INTERNAL MEDICINE

## 2022-03-07 PROCEDURE — 3288F PR FALLS RISK ASSESSMENT DOCUMENTED: ICD-10-PCS | Mod: CPTII,S$GLB,, | Performed by: INTERNAL MEDICINE

## 2022-03-07 PROCEDURE — 3075F PR MOST RECENT SYSTOLIC BLOOD PRESS GE 130-139MM HG: ICD-10-PCS | Mod: CPTII,S$GLB,, | Performed by: INTERNAL MEDICINE

## 2022-03-07 PROCEDURE — 36415 COLL VENOUS BLD VENIPUNCTURE: CPT | Mod: PO | Performed by: INTERNAL MEDICINE

## 2022-03-07 PROCEDURE — 99999 PR PBB SHADOW E&M-EST. PATIENT-LVL IV: CPT | Mod: PBBFAC,,, | Performed by: INTERNAL MEDICINE

## 2022-03-07 PROCEDURE — 3288F FALL RISK ASSESSMENT DOCD: CPT | Mod: CPTII,S$GLB,, | Performed by: INTERNAL MEDICINE

## 2022-03-07 PROCEDURE — 1159F MED LIST DOCD IN RCRD: CPT | Mod: CPTII,S$GLB,, | Performed by: INTERNAL MEDICINE

## 2022-03-07 PROCEDURE — 3075F SYST BP GE 130 - 139MM HG: CPT | Mod: CPTII,S$GLB,, | Performed by: INTERNAL MEDICINE

## 2022-03-07 PROCEDURE — 1101F PT FALLS ASSESS-DOCD LE1/YR: CPT | Mod: CPTII,S$GLB,, | Performed by: INTERNAL MEDICINE

## 2022-03-07 PROCEDURE — 4010F PR ACE/ARB THEARPY RXD/TAKEN: ICD-10-PCS | Mod: CPTII,S$GLB,, | Performed by: INTERNAL MEDICINE

## 2022-03-07 NOTE — PROGRESS NOTES
Subjective:       Patient ID: Jose Long is a 66 y.o. male.    Chief Complaint: Annual Exam, Hypertension, Hyperlipidemia, and Memory Loss    Hypertension  Pertinent negatives include no chest pain, headaches, neck pain, palpitations or shortness of breath.   Hyperlipidemia  Pertinent negatives include no chest pain, myalgias or shortness of breath.     Past Medical History:   Diagnosis Date    Dyslipidemia     Hypertension     Multiple allergies      Past Surgical History:   Procedure Laterality Date    COLONOSCOPY N/A 6/29/2018    Procedure: COLONOSCOPY;  Surgeon: Kermit Leone MD;  Location: [facility];  Service: Endoscopy;  Laterality: N/A;     Family History   Problem Relation Age of Onset    Cataracts Mother     Hypertension Mother     Alzheimer's disease Mother     Diabetes Maternal Aunt     Macular degeneration Maternal Aunt     Diabetes Paternal Grandmother     Colon cancer Father     Melanoma Neg Hx      Social History     Socioeconomic History    Marital status: Single   Tobacco Use    Smoking status: Former Smoker    Smokeless tobacco: Never Used   Substance and Sexual Activity    Alcohol use: No    Drug use: No     Review of Systems   Constitutional: Positive for fatigue. Negative for activity change, appetite change, chills, diaphoresis, fever and unexpected weight change.   HENT: Negative for drooling, ear discharge, ear pain, facial swelling, hearing loss, mouth sores, nosebleeds, postnasal drip, rhinorrhea, sinus pressure, sneezing, sore throat, tinnitus, trouble swallowing and voice change.    Eyes: Negative for photophobia, redness and visual disturbance.   Respiratory: Negative for apnea, cough, choking, chest tightness, shortness of breath and wheezing.    Cardiovascular: Negative for chest pain, palpitations and leg swelling.   Gastrointestinal: Negative for abdominal distention, abdominal pain, anal bleeding, blood in stool, constipation, diarrhea, nausea, rectal pain and  vomiting.   Endocrine: Negative for cold intolerance, heat intolerance, polydipsia, polyphagia and polyuria.   Genitourinary: Negative for difficulty urinating, dysuria, enuresis, flank pain, frequency, genital sores, hematuria and urgency.   Musculoskeletal: Positive for arthralgias. Negative for back pain, gait problem, joint swelling, myalgias, neck pain and neck stiffness.   Skin: Negative for color change, pallor, rash and wound.   Allergic/Immunologic: Negative for food allergies and immunocompromised state.   Neurological: Negative for dizziness, tremors, seizures, syncope, facial asymmetry, speech difficulty, weakness, light-headedness, numbness and headaches.        Memory changes------more difficult to remember-------   Hematological: Negative for adenopathy. Does not bruise/bleed easily.   Psychiatric/Behavioral: Negative for agitation, behavioral problems, decreased concentration, dysphoric mood, hallucinations, self-injury, sleep disturbance and suicidal ideas. The patient is not nervous/anxious and is not hyperactive.        Objective:      Physical Exam  Vitals and nursing note reviewed.   Constitutional:       General: He is not in acute distress.     Appearance: Normal appearance. He is well-developed. He is not diaphoretic.   HENT:      Head: Normocephalic and atraumatic.      Mouth/Throat:      Pharynx: No oropharyngeal exudate.   Eyes:      General: No scleral icterus.     Pupils: Pupils are equal, round, and reactive to light.   Neck:      Thyroid: No thyromegaly.      Vascular: No carotid bruit or JVD.      Trachea: No tracheal deviation.   Cardiovascular:      Rate and Rhythm: Normal rate and regular rhythm.      Heart sounds: Normal heart sounds.   Pulmonary:      Effort: Pulmonary effort is normal. No respiratory distress.      Breath sounds: Normal breath sounds. No wheezing or rales.   Chest:      Chest wall: No tenderness.   Abdominal:      General: Bowel sounds are normal. There is no  distension.      Palpations: Abdomen is soft.      Tenderness: There is no abdominal tenderness. There is no guarding or rebound.   Musculoskeletal:         General: No tenderness. Normal range of motion.      Cervical back: Normal range of motion and neck supple.   Lymphadenopathy:      Cervical: No cervical adenopathy.   Skin:     General: Skin is warm and dry.      Coloration: Skin is not pale.      Findings: No erythema or rash.   Neurological:      Mental Status: He is alert and oriented to person, place, and time.   Psychiatric:         Behavior: Behavior normal.         Thought Content: Thought content normal.         Judgment: Judgment normal.         CMP  Sodium   Date Value Ref Range Status   06/28/2021 139 136 - 145 mmol/L Final     Potassium   Date Value Ref Range Status   06/28/2021 3.5 3.5 - 5.1 mmol/L Final     Chloride   Date Value Ref Range Status   06/28/2021 98 95 - 110 mmol/L Final     CO2   Date Value Ref Range Status   06/28/2021 24 23 - 29 mmol/L Final     Glucose   Date Value Ref Range Status   06/28/2021 88 70 - 110 mg/dL Final     BUN   Date Value Ref Range Status   06/28/2021 14 8 - 23 mg/dL Final     Creatinine   Date Value Ref Range Status   06/28/2021 1.2 0.5 - 1.4 mg/dL Final     Calcium   Date Value Ref Range Status   06/28/2021 9.5 8.7 - 10.5 mg/dL Final     Total Protein   Date Value Ref Range Status   06/28/2021 7.9 6.0 - 8.4 g/dL Final     Albumin   Date Value Ref Range Status   06/28/2021 3.9 3.5 - 5.2 g/dL Final     Total Bilirubin   Date Value Ref Range Status   06/28/2021 0.9 0.1 - 1.0 mg/dL Final     Comment:     For infants and newborns, interpretation of results should be based  on gestational age, weight and in agreement with clinical  observations.    Premature Infant recommended reference ranges:  Up to 24 hours.............<8.0 mg/dL  Up to 48 hours............<12.0 mg/dL  3-5 days..................<15.0 mg/dL  6-29 days.................<15.0 mg/dL       Alkaline  Phosphatase   Date Value Ref Range Status   06/28/2021 80 55 - 135 U/L Final     AST   Date Value Ref Range Status   06/28/2021 32 10 - 40 U/L Final     ALT   Date Value Ref Range Status   06/28/2021 15 10 - 44 U/L Final     Anion Gap   Date Value Ref Range Status   06/28/2021 17 (H) 8 - 16 mmol/L Final     eGFR if    Date Value Ref Range Status   06/28/2021 >60.0 >60 mL/min/1.73 m^2 Final     eGFR if non    Date Value Ref Range Status   06/28/2021 >60.0 >60 mL/min/1.73 m^2 Final     Comment:     Calculation used to obtain the estimated glomerular filtration  rate (eGFR) is the CKD-EPI equation.        Lab Results   Component Value Date    WBC 11.46 06/28/2021    HGB 14.8 06/28/2021    HCT 45.0 06/28/2021    MCV 89 06/28/2021     06/28/2021     Lab Results   Component Value Date    CHOL 173 06/08/2021     Lab Results   Component Value Date    HDL 60 06/08/2021     Lab Results   Component Value Date    LDLCALC 102.6 06/08/2021     Lab Results   Component Value Date    TRIG 52 06/08/2021     Lab Results   Component Value Date    CHOLHDL 34.7 06/08/2021     Lab Results   Component Value Date    TSH 0.847 05/24/2019     Lab Results   Component Value Date    HGBA1C 5.4 06/08/2021     Assessment:       1. Primary hypertension    2. Dyslipidemia    3. Memory changes        Plan:   Primary hypertension  -     Comprehensive Metabolic Panel; Future; Expected date: 03/07/2022  -     CBC Auto Differential; Future; Expected date: 03/07/2022    Dyslipidemia    Memory changes  -     TSH; Future; Expected date: 03/07/2022  -     Vitamin B12; Future; Expected date: 03/07/2022  -     Ambulatory referral/consult to Neurology; Future; Expected date: 03/14/2022    Continue meds, as above----------f/u 1 month--------------

## 2022-08-31 ENCOUNTER — OFFICE VISIT (OUTPATIENT)
Dept: DERMATOLOGY | Facility: CLINIC | Age: 67
End: 2022-08-31
Payer: COMMERCIAL

## 2022-08-31 DIAGNOSIS — L82.1 SEBORRHEIC KERATOSIS: Primary | ICD-10-CM

## 2022-08-31 DIAGNOSIS — L57.0 ACTINIC KERATOSES: ICD-10-CM

## 2022-08-31 PROCEDURE — 17000 PR DESTRUCTION(LASER SURGERY,CRYOSURGERY,CHEMOSURGERY),PREMALIGNANT LESIONS,FIRST LESION: ICD-10-PCS | Mod: S$GLB,,, | Performed by: DERMATOLOGY

## 2022-08-31 PROCEDURE — 99213 OFFICE O/P EST LOW 20 MIN: CPT | Mod: 25,S$GLB,, | Performed by: DERMATOLOGY

## 2022-08-31 PROCEDURE — 4010F ACE/ARB THERAPY RXD/TAKEN: CPT | Mod: CPTII,S$GLB,, | Performed by: DERMATOLOGY

## 2022-08-31 PROCEDURE — 17003 DESTRUCTION, PREMALIGNANT LESIONS; SECOND THROUGH 14 LESIONS: ICD-10-PCS | Mod: S$GLB,,, | Performed by: DERMATOLOGY

## 2022-08-31 PROCEDURE — 17003 DESTRUCT PREMALG LES 2-14: CPT | Mod: S$GLB,,, | Performed by: DERMATOLOGY

## 2022-08-31 PROCEDURE — 1126F AMNT PAIN NOTED NONE PRSNT: CPT | Mod: CPTII,S$GLB,, | Performed by: DERMATOLOGY

## 2022-08-31 PROCEDURE — 1101F PT FALLS ASSESS-DOCD LE1/YR: CPT | Mod: CPTII,S$GLB,, | Performed by: DERMATOLOGY

## 2022-08-31 PROCEDURE — 3288F FALL RISK ASSESSMENT DOCD: CPT | Mod: CPTII,S$GLB,, | Performed by: DERMATOLOGY

## 2022-08-31 PROCEDURE — 1126F PR PAIN SEVERITY QUANTIFIED, NO PAIN PRESENT: ICD-10-PCS | Mod: CPTII,S$GLB,, | Performed by: DERMATOLOGY

## 2022-08-31 PROCEDURE — 99213 PR OFFICE/OUTPT VISIT, EST, LEVL III, 20-29 MIN: ICD-10-PCS | Mod: 25,S$GLB,, | Performed by: DERMATOLOGY

## 2022-08-31 PROCEDURE — 1159F MED LIST DOCD IN RCRD: CPT | Mod: CPTII,S$GLB,, | Performed by: DERMATOLOGY

## 2022-08-31 PROCEDURE — 1159F PR MEDICATION LIST DOCUMENTED IN MEDICAL RECORD: ICD-10-PCS | Mod: CPTII,S$GLB,, | Performed by: DERMATOLOGY

## 2022-08-31 PROCEDURE — 1160F PR REVIEW ALL MEDS BY PRESCRIBER/CLIN PHARMACIST DOCUMENTED: ICD-10-PCS | Mod: CPTII,S$GLB,, | Performed by: DERMATOLOGY

## 2022-08-31 PROCEDURE — 99999 PR PBB SHADOW E&M-EST. PATIENT-LVL II: CPT | Mod: PBBFAC,,, | Performed by: DERMATOLOGY

## 2022-08-31 PROCEDURE — 17000 DESTRUCT PREMALG LESION: CPT | Mod: S$GLB,,, | Performed by: DERMATOLOGY

## 2022-08-31 PROCEDURE — 4010F PR ACE/ARB THEARPY RXD/TAKEN: ICD-10-PCS | Mod: CPTII,S$GLB,, | Performed by: DERMATOLOGY

## 2022-08-31 PROCEDURE — 1160F RVW MEDS BY RX/DR IN RCRD: CPT | Mod: CPTII,S$GLB,, | Performed by: DERMATOLOGY

## 2022-08-31 PROCEDURE — 1101F PR PT FALLS ASSESS DOC 0-1 FALLS W/OUT INJ PAST YR: ICD-10-PCS | Mod: CPTII,S$GLB,, | Performed by: DERMATOLOGY

## 2022-08-31 PROCEDURE — 99999 PR PBB SHADOW E&M-EST. PATIENT-LVL II: ICD-10-PCS | Mod: PBBFAC,,, | Performed by: DERMATOLOGY

## 2022-08-31 PROCEDURE — 3288F PR FALLS RISK ASSESSMENT DOCUMENTED: ICD-10-PCS | Mod: CPTII,S$GLB,, | Performed by: DERMATOLOGY

## 2022-08-31 NOTE — PROGRESS NOTES
Subjective:       Patient ID:  Jose Long is a 66 y.o. male who presents for   Chief Complaint   Patient presents with    Skin Check     Hx of AK's, last seen on  1/7/20.  He c/o irritated lesion of the left neck x several months. No tx tried. This is a high risk patient here to check for the development of new lesions. Denies bleeding, tender, growing, or concerning lesions  The patient denies personal history of skin cancer. Father c/ hx of skin CA.       Review of Systems   Constitutional:  Negative for fever and chills.   Gastrointestinal:  Negative for nausea and vomiting.   Skin:  Positive for activity-related sunscreen use. Negative for daily sunscreen use and recent sunburn.   Hematologic/Lymphatic: Does not bruise/bleed easily.      Objective:    Physical Exam   Constitutional: He appears well-developed and well-nourished. No distress.   Neurological: He is alert and oriented to person, place, and time. He is not disoriented.   Psychiatric: He has a normal mood and affect.   Skin:   Areas Examined (abnormalities noted in diagram):   Scalp / Hair Palpated and Inspected  Head / Face Inspection Performed  Neck Inspection Performed  Chest / Axilla Inspection Performed  Abdomen Inspection Performed  Genitals / Buttocks / Groin Inspection Performed  Back Inspection Performed  RUE Inspected  LUE Inspection Performed  RLE Inspected  LLE Inspection Performed  Nails and Digits Inspection Performed                 Diagram Legend     Erythematous scaling macule/papule c/w actinic keratosis       Vascular papule c/w angioma      Pigmented verrucoid papule/plaque c/w seborrheic keratosis      Yellow umbilicated papule c/w sebaceous hyperplasia      Irregularly shaped tan macule c/w lentigo     1-2 mm smooth white papules consistent with Milia      Movable subcutaneous cyst with punctum c/w epidermal inclusion cyst      Subcutaneous movable cyst c/w pilar cyst      Firm pink to brown papule c/w dermatofibroma       Pedunculated fleshy papule(s) c/w skin tag(s)      Evenly pigmented macule c/w junctional nevus     Mildly variegated pigmented, slightly irregular-bordered macule c/w mildly atypical nevus      Flesh colored to evenly pigmented papule c/w intradermal nevus       Pink pearly papule/plaque c/w basal cell carcinoma      Erythematous hyperkeratotic cursted plaque c/w SCC      Surgical scar with no sign of skin cancer recurrence      Open and closed comedones      Inflammatory papules and pustules      Verrucoid papule consistent consistent with wart     Erythematous eczematous patches and plaques     Dystrophic onycholytic nail with subungual debris c/w onychomycosis     Umbilicated papule    Erythematous-base heme-crusted tan verrucoid plaque consistent with inflamed seborrheic keratosis     Erythematous Silvery Scaling Plaque c/w Psoriasis     See annotation      Assessment / Plan:        Seborrheic keratosis  Reassurance given. Discussed diagnosis and that lesions are benign.  AAD handout given.     Actinic keratoses  Cryosurgery Procedure Note    The patient is informed of the precancerous quality and need for treatment of these lesions. After risks, benefits and alternatives explained, including blistering, pain, hyper- and hypopigmentation, patient verbally consents to cryotherapy to precancerous lesions. Liquid nitrogen cryosurgery is applied to the 5 actinic keratoses, as detailed in the physical exam, to produce a freeze injury. The patient is aware that blisters may form and is instructed on wound care with gentle cleansing and use of vaseline ointment to keep moist until healed. The patient is supplied a handout on cryosurgery and is instructed to call if lesions do not completely resolve.           Follow up in about 1 year (around 8/31/2023).

## 2022-08-31 NOTE — PATIENT INSTRUCTIONS

## 2022-09-02 ENCOUNTER — OFFICE VISIT (OUTPATIENT)
Dept: OPHTHALMOLOGY | Facility: CLINIC | Age: 67
End: 2022-09-02
Payer: COMMERCIAL

## 2022-09-02 DIAGNOSIS — H52.221 REGULAR ASTIGMATISM OF RIGHT EYE: ICD-10-CM

## 2022-09-02 DIAGNOSIS — H25.13 NUCLEAR SCLEROSIS, BILATERAL: Primary | ICD-10-CM

## 2022-09-02 DIAGNOSIS — H52.4 PRESBYOPIA: ICD-10-CM

## 2022-09-02 DIAGNOSIS — H43.813 POSTERIOR VITREOUS DETACHMENT OF BOTH EYES: ICD-10-CM

## 2022-09-02 PROCEDURE — 1159F PR MEDICATION LIST DOCUMENTED IN MEDICAL RECORD: ICD-10-PCS | Mod: CPTII,S$GLB,, | Performed by: OPTOMETRIST

## 2022-09-02 PROCEDURE — 92015 PR REFRACTION: ICD-10-PCS | Mod: S$GLB,,, | Performed by: OPTOMETRIST

## 2022-09-02 PROCEDURE — 1159F MED LIST DOCD IN RCRD: CPT | Mod: CPTII,S$GLB,, | Performed by: OPTOMETRIST

## 2022-09-02 PROCEDURE — 92015 DETERMINE REFRACTIVE STATE: CPT | Mod: S$GLB,,, | Performed by: OPTOMETRIST

## 2022-09-02 PROCEDURE — 1160F PR REVIEW ALL MEDS BY PRESCRIBER/CLIN PHARMACIST DOCUMENTED: ICD-10-PCS | Mod: CPTII,S$GLB,, | Performed by: OPTOMETRIST

## 2022-09-02 PROCEDURE — 1160F RVW MEDS BY RX/DR IN RCRD: CPT | Mod: CPTII,S$GLB,, | Performed by: OPTOMETRIST

## 2022-09-02 PROCEDURE — 4010F ACE/ARB THERAPY RXD/TAKEN: CPT | Mod: CPTII,S$GLB,, | Performed by: OPTOMETRIST

## 2022-09-02 PROCEDURE — 4010F PR ACE/ARB THEARPY RXD/TAKEN: ICD-10-PCS | Mod: CPTII,S$GLB,, | Performed by: OPTOMETRIST

## 2022-09-02 PROCEDURE — 92004 PR EYE EXAM, NEW PATIENT,COMPREHESV: ICD-10-PCS | Mod: S$GLB,,, | Performed by: OPTOMETRIST

## 2022-09-02 PROCEDURE — 99999 PR PBB SHADOW E&M-EST. PATIENT-LVL II: CPT | Mod: PBBFAC,,, | Performed by: OPTOMETRIST

## 2022-09-02 PROCEDURE — 99999 PR PBB SHADOW E&M-EST. PATIENT-LVL II: ICD-10-PCS | Mod: PBBFAC,,, | Performed by: OPTOMETRIST

## 2022-09-02 PROCEDURE — 92004 COMPRE OPH EXAM NEW PT 1/>: CPT | Mod: S$GLB,,, | Performed by: OPTOMETRIST

## 2022-09-02 NOTE — PROGRESS NOTES
HPI     Annual Exam            Comments: Pt reports for annual with floaters. Denies any pain or   irritation. Va stable. Noticing some floaters. Recently diagnosed with   alzheimer's.           Comments    Ocular Migraines  Presbyopia  Myopia  Nuclear sclerosis            Last edited by Doroteo Lord on 9/2/2022  2:32 PM.            Assessment /Plan     For exam results, see Encounter Report.    Nuclear sclerosis, bilateral  Cataracts not significantly affecting activities of daily living and therefore surgery is not indicated at this time.   Will continue to monitor over the next 12 months. Pt to call or RTC with any significant change in vision prior to next visit.     Posterior vitreous detachment of both eyes  The nature of posterior vitreous detachment was discussed with the patient.  Signs and symptoms of retinal detachment were discussed with patient.   Return to clinic as soon as possible (same day) if you notice any new floaters, flashes of light, curtain/veil over your vision from any direction, or any change in vision.    Regular astigmatism of right eye  Presbyopia  Eyeglass Final Rx       Eyeglass Final Rx         Sphere Cylinder Axis Add    Right -0.50 +0.75 010 +2.75    Left +0.50   +2.75      Expiration Date: 9/2/2023    Comments: Signs are correct                      RTC 1 yr for dilated eye exam or PRN if any problems.   Discussed above and answered questions.

## 2022-09-12 ENCOUNTER — TELEPHONE (OUTPATIENT)
Dept: OPHTHALMOLOGY | Facility: CLINIC | Age: 67
End: 2022-09-12
Payer: COMMERCIAL

## 2022-09-12 NOTE — TELEPHONE ENCOUNTER
----- Message from Charley Vasques sent at 9/12/2022  8:31 AM CDT -----  Contact: Jose Garcia would like a call back at [phone] or [phone], in regards to having concerns about his last appointment. Patient states he is still seeing floaters.  Thanks

## 2022-09-12 NOTE — TELEPHONE ENCOUNTER
PNG spoke to pt, pt states NO new symptoms. Pt states floaters are the same, no flashes or new floaters/ Pt had a question whether his floaters would go away or permanent. PNG will speak to DNL and return his call.

## 2022-09-19 ENCOUNTER — TELEPHONE (OUTPATIENT)
Dept: OPHTHALMOLOGY | Facility: CLINIC | Age: 67
End: 2022-09-19
Payer: COMMERCIAL

## 2022-09-19 NOTE — TELEPHONE ENCOUNTER
----- Message from Nisha Mensah sent at 9/19/2022 10:27 AM CDT -----  Contact: Patient, [phone]  Calling to speak with the nurse regarding he does remember what was discussed at his appointment. Please call him. Thanks.

## 2022-10-05 ENCOUNTER — TELEPHONE (OUTPATIENT)
Dept: OPHTHALMOLOGY | Facility: CLINIC | Age: 67
End: 2022-10-05
Payer: COMMERCIAL

## 2022-10-05 NOTE — TELEPHONE ENCOUNTER
----- Message from Ibrahima Alfredo sent at 10/5/2022 10:06 AM CDT -----  Contact: [phone]  Py is calling regarding eyes . Please call back at [phone] . Thanks/dj

## 2022-10-20 ENCOUNTER — OFFICE VISIT (OUTPATIENT)
Dept: OPHTHALMOLOGY | Facility: CLINIC | Age: 67
End: 2022-10-20
Payer: COMMERCIAL

## 2022-10-20 ENCOUNTER — PATIENT MESSAGE (OUTPATIENT)
Dept: RESEARCH | Facility: HOSPITAL | Age: 67
End: 2022-10-20
Payer: COMMERCIAL

## 2022-10-20 DIAGNOSIS — H25.13 NUCLEAR SCLEROSIS, BILATERAL: ICD-10-CM

## 2022-10-20 DIAGNOSIS — H43.813 POSTERIOR VITREOUS DETACHMENT OF BOTH EYES: Primary | ICD-10-CM

## 2022-10-20 PROCEDURE — 99999 PR PBB SHADOW E&M-EST. PATIENT-LVL II: CPT | Mod: PBBFAC,,, | Performed by: OPTOMETRIST

## 2022-10-20 PROCEDURE — 99999 PR PBB SHADOW E&M-EST. PATIENT-LVL II: ICD-10-PCS | Mod: PBBFAC,,, | Performed by: OPTOMETRIST

## 2022-10-20 PROCEDURE — 1159F MED LIST DOCD IN RCRD: CPT | Mod: CPTII,S$GLB,, | Performed by: OPTOMETRIST

## 2022-10-20 PROCEDURE — 1160F PR REVIEW ALL MEDS BY PRESCRIBER/CLIN PHARMACIST DOCUMENTED: ICD-10-PCS | Mod: CPTII,S$GLB,, | Performed by: OPTOMETRIST

## 2022-10-20 PROCEDURE — 92014 COMPRE OPH EXAM EST PT 1/>: CPT | Mod: S$GLB,,, | Performed by: OPTOMETRIST

## 2022-10-20 PROCEDURE — 4010F PR ACE/ARB THEARPY RXD/TAKEN: ICD-10-PCS | Mod: CPTII,S$GLB,, | Performed by: OPTOMETRIST

## 2022-10-20 PROCEDURE — 4010F ACE/ARB THERAPY RXD/TAKEN: CPT | Mod: CPTII,S$GLB,, | Performed by: OPTOMETRIST

## 2022-10-20 PROCEDURE — 1159F PR MEDICATION LIST DOCUMENTED IN MEDICAL RECORD: ICD-10-PCS | Mod: CPTII,S$GLB,, | Performed by: OPTOMETRIST

## 2022-10-20 PROCEDURE — 1160F RVW MEDS BY RX/DR IN RCRD: CPT | Mod: CPTII,S$GLB,, | Performed by: OPTOMETRIST

## 2022-10-20 PROCEDURE — 92014 PR EYE EXAM, EST PATIENT,COMPREHESV: ICD-10-PCS | Mod: S$GLB,,, | Performed by: OPTOMETRIST

## 2022-10-20 NOTE — PROGRESS NOTES
HPI    NP to DKT  Patient here today for floaters Ou  Patient states floaters have been present for a dew months  Has noticed that floaters have increased  No pain No flashes    1. NSC OU  2. PVD OU  Last edited by Lupe Browning, PCT on 10/20/2022  2:21 PM.            Assessment /Plan     For exam results, see Encounter Report.    Posterior vitreous detachment of both eyes  The nature of posterior vitreous detachment was discussed with the patient.  Signs and symptoms of retinal detachment were discussed with patient.   Return to clinic as soon as possible (same day) if you notice any new floaters, flashes of light, curtain/veil over your vision from any direction, or any change in vision.   Nuclear sclerosis, bilateral  Cataracts are not visually significant and not affecting activities of daily living. Annual observation is recommended at this time. Patient to call or RTC with any significant change in vision prior to next visit.     RTC with Dr. Marinelli as scheduled for annual eye exam, sooner if changes to vision or worsening symptoms

## 2022-11-02 ENCOUNTER — OFFICE VISIT (OUTPATIENT)
Dept: FAMILY MEDICINE | Facility: CLINIC | Age: 67
End: 2022-11-02
Payer: COMMERCIAL

## 2022-11-02 VITALS
WEIGHT: 151 LBS | OXYGEN SATURATION: 98 % | SYSTOLIC BLOOD PRESSURE: 124 MMHG | HEART RATE: 62 BPM | RESPIRATION RATE: 18 BRPM | TEMPERATURE: 98 F | BODY MASS INDEX: 21.62 KG/M2 | DIASTOLIC BLOOD PRESSURE: 62 MMHG | HEIGHT: 70 IN

## 2022-11-02 DIAGNOSIS — I10 PRIMARY HYPERTENSION: Primary | Chronic | ICD-10-CM

## 2022-11-02 DIAGNOSIS — G20.A1 PARKINSON'S DISEASE: ICD-10-CM

## 2022-11-02 DIAGNOSIS — M79.604 PAIN IN BOTH LOWER EXTREMITIES: ICD-10-CM

## 2022-11-02 DIAGNOSIS — E78.5 DYSLIPIDEMIA: ICD-10-CM

## 2022-11-02 DIAGNOSIS — F02.80 ALZHEIMER'S DEMENTIA WITHOUT BEHAVIORAL DISTURBANCE, PSYCHOTIC DISTURBANCE, MOOD DISTURBANCE, OR ANXIETY, UNSPECIFIED DEMENTIA SEVERITY, UNSPECIFIED TIMING OF DEMENTIA ONSET: ICD-10-CM

## 2022-11-02 DIAGNOSIS — G30.9 ALZHEIMER'S DEMENTIA WITHOUT BEHAVIORAL DISTURBANCE, PSYCHOTIC DISTURBANCE, MOOD DISTURBANCE, OR ANXIETY, UNSPECIFIED DEMENTIA SEVERITY, UNSPECIFIED TIMING OF DEMENTIA ONSET: ICD-10-CM

## 2022-11-02 DIAGNOSIS — Z00.00 ROUTINE ADULT HEALTH MAINTENANCE: ICD-10-CM

## 2022-11-02 DIAGNOSIS — M79.605 PAIN IN BOTH LOWER EXTREMITIES: ICD-10-CM

## 2022-11-02 PROCEDURE — 3288F PR FALLS RISK ASSESSMENT DOCUMENTED: ICD-10-PCS | Mod: CPTII,S$GLB,, | Performed by: INTERNAL MEDICINE

## 2022-11-02 PROCEDURE — 1126F PR PAIN SEVERITY QUANTIFIED, NO PAIN PRESENT: ICD-10-PCS | Mod: CPTII,S$GLB,, | Performed by: INTERNAL MEDICINE

## 2022-11-02 PROCEDURE — 99214 OFFICE O/P EST MOD 30 MIN: CPT | Mod: S$GLB,,, | Performed by: INTERNAL MEDICINE

## 2022-11-02 PROCEDURE — 99999 PR PBB SHADOW E&M-EST. PATIENT-LVL IV: CPT | Mod: PBBFAC,,, | Performed by: INTERNAL MEDICINE

## 2022-11-02 PROCEDURE — 1101F PR PT FALLS ASSESS DOC 0-1 FALLS W/OUT INJ PAST YR: ICD-10-PCS | Mod: CPTII,S$GLB,, | Performed by: INTERNAL MEDICINE

## 2022-11-02 PROCEDURE — 3078F DIAST BP <80 MM HG: CPT | Mod: CPTII,S$GLB,, | Performed by: INTERNAL MEDICINE

## 2022-11-02 PROCEDURE — 99214 PR OFFICE/OUTPT VISIT, EST, LEVL IV, 30-39 MIN: ICD-10-PCS | Mod: S$GLB,,, | Performed by: INTERNAL MEDICINE

## 2022-11-02 PROCEDURE — 4010F ACE/ARB THERAPY RXD/TAKEN: CPT | Mod: CPTII,S$GLB,, | Performed by: INTERNAL MEDICINE

## 2022-11-02 PROCEDURE — 1159F PR MEDICATION LIST DOCUMENTED IN MEDICAL RECORD: ICD-10-PCS | Mod: CPTII,S$GLB,, | Performed by: INTERNAL MEDICINE

## 2022-11-02 PROCEDURE — 4010F PR ACE/ARB THEARPY RXD/TAKEN: ICD-10-PCS | Mod: CPTII,S$GLB,, | Performed by: INTERNAL MEDICINE

## 2022-11-02 PROCEDURE — 1126F AMNT PAIN NOTED NONE PRSNT: CPT | Mod: CPTII,S$GLB,, | Performed by: INTERNAL MEDICINE

## 2022-11-02 PROCEDURE — 3008F BODY MASS INDEX DOCD: CPT | Mod: CPTII,S$GLB,, | Performed by: INTERNAL MEDICINE

## 2022-11-02 PROCEDURE — 1101F PT FALLS ASSESS-DOCD LE1/YR: CPT | Mod: CPTII,S$GLB,, | Performed by: INTERNAL MEDICINE

## 2022-11-02 PROCEDURE — 1159F MED LIST DOCD IN RCRD: CPT | Mod: CPTII,S$GLB,, | Performed by: INTERNAL MEDICINE

## 2022-11-02 PROCEDURE — 3074F SYST BP LT 130 MM HG: CPT | Mod: CPTII,S$GLB,, | Performed by: INTERNAL MEDICINE

## 2022-11-02 PROCEDURE — 3288F FALL RISK ASSESSMENT DOCD: CPT | Mod: CPTII,S$GLB,, | Performed by: INTERNAL MEDICINE

## 2022-11-02 PROCEDURE — 3074F PR MOST RECENT SYSTOLIC BLOOD PRESSURE < 130 MM HG: ICD-10-PCS | Mod: CPTII,S$GLB,, | Performed by: INTERNAL MEDICINE

## 2022-11-02 PROCEDURE — 3008F PR BODY MASS INDEX (BMI) DOCUMENTED: ICD-10-PCS | Mod: CPTII,S$GLB,, | Performed by: INTERNAL MEDICINE

## 2022-11-02 PROCEDURE — 99999 PR PBB SHADOW E&M-EST. PATIENT-LVL IV: ICD-10-PCS | Mod: PBBFAC,,, | Performed by: INTERNAL MEDICINE

## 2022-11-02 PROCEDURE — 3078F PR MOST RECENT DIASTOLIC BLOOD PRESSURE < 80 MM HG: ICD-10-PCS | Mod: CPTII,S$GLB,, | Performed by: INTERNAL MEDICINE

## 2022-11-02 RX ORDER — CARBIDOPA AND LEVODOPA 10; 100 MG/1; MG/1
1 TABLET ORAL 3 TIMES DAILY
COMMUNITY
Start: 2022-09-19

## 2022-11-02 RX ORDER — MEMANTINE HYDROCHLORIDE 10 MG/1
10 TABLET ORAL 2 TIMES DAILY
COMMUNITY
Start: 2022-10-16

## 2022-11-02 NOTE — PROGRESS NOTES
Subjective:       Patient ID: Jose Long is a 66 y.o. male.    Chief Complaint: Follow-up, Hypertension, Dementia, and Hyperlipidemia    Follow-up  Associated symptoms include arthralgias. Pertinent negatives include no abdominal pain, chest pain, chills, coughing, diaphoresis, fatigue, fever, headaches, joint swelling, myalgias, nausea, neck pain, numbness, rash, sore throat, vomiting or weakness.   Hypertension  Pertinent negatives include no chest pain, headaches, neck pain, palpitations or shortness of breath.   Hyperlipidemia  Pertinent negatives include no chest pain, myalgias or shortness of breath.   Past Medical History:   Diagnosis Date    Dyslipidemia     Hypertension     Multiple allergies      Past Surgical History:   Procedure Laterality Date    COLONOSCOPY N/A 6/29/2018    Procedure: COLONOSCOPY;  Surgeon: Kermit Leone MD;  Location: [facility];  Service: Endoscopy;  Laterality: N/A;     Family History   Problem Relation Age of Onset    Cataracts Mother     Hypertension Mother     Alzheimer's disease Mother     Diabetes Maternal Aunt     Macular degeneration Maternal Aunt     Diabetes Paternal Grandmother     Colon cancer Father     Melanoma Neg Hx      Social History     Socioeconomic History    Marital status: Single   Tobacco Use    Smoking status: Former    Smokeless tobacco: Never   Substance and Sexual Activity    Alcohol use: No    Drug use: No     Review of Systems   Constitutional:  Negative for activity change, appetite change, chills, diaphoresis, fatigue, fever and unexpected weight change.   HENT:  Negative for drooling, ear discharge, ear pain, facial swelling, hearing loss, mouth sores, nosebleeds, postnasal drip, rhinorrhea, sinus pressure, sneezing, sore throat, tinnitus, trouble swallowing and voice change.    Eyes:  Negative for photophobia, redness and visual disturbance.   Respiratory:  Negative for apnea, cough, choking, chest tightness, shortness of breath and wheezing.     Cardiovascular:  Negative for chest pain, palpitations and leg swelling.   Gastrointestinal:  Negative for abdominal distention, abdominal pain, anal bleeding, blood in stool, constipation, diarrhea, nausea and vomiting.   Endocrine: Negative for cold intolerance, heat intolerance, polydipsia, polyphagia and polyuria.   Genitourinary:  Negative for difficulty urinating, dysuria, enuresis, flank pain, frequency, genital sores, hematuria and urgency.   Musculoskeletal:  Positive for arthralgias. Negative for back pain, gait problem, joint swelling, myalgias, neck pain and neck stiffness.   Skin:  Negative for color change, pallor, rash and wound.   Allergic/Immunologic: Negative for food allergies and immunocompromised state.   Neurological:  Negative for dizziness, tremors, seizures, syncope, facial asymmetry, speech difficulty, weakness, light-headedness, numbness and headaches.   Hematological:  Negative for adenopathy. Does not bruise/bleed easily.   Psychiatric/Behavioral:  Positive for confusion. Negative for agitation, behavioral problems, decreased concentration, dysphoric mood, hallucinations, self-injury, sleep disturbance and suicidal ideas. The patient is not hyperactive.      Objective:      Physical Exam  Vitals and nursing note reviewed.   Constitutional:       General: He is not in acute distress.     Appearance: Normal appearance. He is well-developed. He is not diaphoretic.   HENT:      Head: Normocephalic and atraumatic.      Mouth/Throat:      Pharynx: No oropharyngeal exudate.   Eyes:      General: No scleral icterus.     Pupils: Pupils are equal, round, and reactive to light.   Neck:      Thyroid: No thyromegaly.      Vascular: No carotid bruit or JVD.      Trachea: No tracheal deviation.   Cardiovascular:      Rate and Rhythm: Normal rate and regular rhythm.      Heart sounds: Normal heart sounds.   Pulmonary:      Effort: Pulmonary effort is normal. No respiratory distress.      Breath  sounds: Normal breath sounds. No wheezing or rales.   Chest:      Chest wall: No tenderness.   Abdominal:      General: Bowel sounds are normal. There is no distension.      Palpations: Abdomen is soft.      Tenderness: There is no abdominal tenderness. There is no guarding or rebound.   Musculoskeletal:         General: No tenderness. Normal range of motion.      Cervical back: Normal range of motion and neck supple.   Lymphadenopathy:      Cervical: No cervical adenopathy.   Skin:     General: Skin is warm and dry.      Coloration: Skin is not pale.      Findings: No erythema or rash.   Neurological:      Mental Status: He is alert.   Psychiatric:         Behavior: Behavior normal.       CMP  Sodium   Date Value Ref Range Status   03/07/2022 145 136 - 145 mmol/L Final     Potassium   Date Value Ref Range Status   03/07/2022 3.5 3.5 - 5.1 mmol/L Final     Chloride   Date Value Ref Range Status   03/07/2022 101 95 - 110 mmol/L Final     CO2   Date Value Ref Range Status   03/07/2022 29 23 - 29 mmol/L Final     Glucose   Date Value Ref Range Status   03/07/2022 90 70 - 110 mg/dL Final     BUN   Date Value Ref Range Status   03/07/2022 14 8 - 23 mg/dL Final     Creatinine   Date Value Ref Range Status   03/07/2022 1.3 0.5 - 1.4 mg/dL Final     Calcium   Date Value Ref Range Status   03/07/2022 9.4 8.7 - 10.5 mg/dL Final     Total Protein   Date Value Ref Range Status   03/07/2022 7.4 6.0 - 8.4 g/dL Final     Albumin   Date Value Ref Range Status   03/07/2022 4.2 3.5 - 5.2 g/dL Final     Total Bilirubin   Date Value Ref Range Status   03/07/2022 0.7 0.1 - 1.0 mg/dL Final     Comment:     For infants and newborns, interpretation of results should be based  on gestational age, weight and in agreement with clinical  observations.    Premature Infant recommended reference ranges:  Up to 24 hours.............<8.0 mg/dL  Up to 48 hours............<12.0 mg/dL  3-5 days..................<15.0 mg/dL  6-29  days.................<15.0 mg/dL       Alkaline Phosphatase   Date Value Ref Range Status   03/07/2022 60 55 - 135 U/L Final     AST   Date Value Ref Range Status   03/07/2022 28 10 - 40 U/L Final     ALT   Date Value Ref Range Status   03/07/2022 29 10 - 44 U/L Final     Anion Gap   Date Value Ref Range Status   03/07/2022 15 8 - 16 mmol/L Final     eGFR if    Date Value Ref Range Status   03/07/2022 >60.0 >60 mL/min/1.73 m^2 Final     eGFR if non    Date Value Ref Range Status   03/07/2022 56.1 (A) >60 mL/min/1.73 m^2 Final     Comment:     Calculation used to obtain the estimated glomerular filtration  rate (eGFR) is the CKD-EPI equation.        Lab Results   Component Value Date    WBC 5.27 03/07/2022    HGB 14.3 03/07/2022    HCT 42.8 03/07/2022    MCV 91 03/07/2022     03/07/2022     Lab Results   Component Value Date    CHOL 173 06/08/2021     Lab Results   Component Value Date    HDL 60 06/08/2021     Lab Results   Component Value Date    LDLCALC 102.6 06/08/2021     Lab Results   Component Value Date    TRIG 52 06/08/2021     Lab Results   Component Value Date    CHOLHDL 34.7 06/08/2021     Lab Results   Component Value Date    TSH 1.136 03/07/2022     Lab Results   Component Value Date    HGBA1C 5.4 06/08/2021     Assessment:       1. Primary hypertension    2. Alzheimer's dementia without behavioral disturbance, psychotic disturbance, mood disturbance, or anxiety, unspecified dementia severity, unspecified timing of dementia onset    3. Parkinson's disease    4. Dyslipidemia    5. Pain in both lower extremities    6. Routine adult health maintenance          Plan:   Primary hypertension    Alzheimer's dementia without behavioral disturbance, psychotic disturbance, mood disturbance, or anxiety, unspecified dementia severity, unspecified timing of dementia onset    Parkinson's disease    Dyslipidemia    Pain in both lower extremities  -     Ankle Brachial Indices  (AMIE); Future    Routine adult health maintenance  -     PSA, Screening; Future; Expected date: 11/02/2022      Continue meds----------------------sees neurology at [facility].            As above-----------f/u 6 months=

## 2022-11-08 ENCOUNTER — HOSPITAL ENCOUNTER (OUTPATIENT)
Dept: CARDIOLOGY | Facility: HOSPITAL | Age: 67
Discharge: HOME | End: 2022-11-08
Attending: INTERNAL MEDICINE
Payer: COMMERCIAL

## 2022-11-08 VITALS
BODY MASS INDEX: 21.62 KG/M2 | WEIGHT: 151 LBS | HEIGHT: 70 IN | DIASTOLIC BLOOD PRESSURE: 76 MMHG | SYSTOLIC BLOOD PRESSURE: 178 MMHG

## 2022-11-08 DIAGNOSIS — M79.604 PAIN IN BOTH LOWER EXTREMITIES: ICD-10-CM

## 2022-11-08 DIAGNOSIS — M79.605 PAIN IN BOTH LOWER EXTREMITIES: ICD-10-CM

## 2022-11-08 LAB
LEFT ARM BP: 169 MMHG
LEFT TBI: 0.96
LEFT TOE PRESSURE: 171 MMHG
RIGHT ABI: 1.34
RIGHT ARM BP: 178 MMHG
RIGHT DORSALIS PEDIS: 238 MMHG
RIGHT TBI: 1.07
RIGHT TOE PRESSURE: 191 MMHG

## 2022-11-08 PROCEDURE — 93922 UPR/L XTREMITY ART 2 LEVELS: CPT

## 2022-11-08 PROCEDURE — 93922 ANKLE BRACHIAL INDICES (ABI): ICD-10-PCS | Mod: 26,,, | Performed by: INTERNAL MEDICINE

## 2022-11-08 PROCEDURE — 93922 UPR/L XTREMITY ART 2 LEVELS: CPT | Mod: 26,,, | Performed by: INTERNAL MEDICINE

## 2023-03-15 DIAGNOSIS — I10 HYPERTENSION: ICD-10-CM

## 2023-04-05 ENCOUNTER — OFFICE VISIT (OUTPATIENT)
Dept: FAMILY MEDICINE | Facility: CLINIC | Age: 68
End: 2023-04-05
Payer: COMMERCIAL

## 2023-04-05 ENCOUNTER — LAB VISIT (OUTPATIENT)
Dept: LAB | Facility: HOSPITAL | Age: 68
End: 2023-04-05
Attending: INTERNAL MEDICINE
Payer: COMMERCIAL

## 2023-04-05 VITALS
DIASTOLIC BLOOD PRESSURE: 86 MMHG | HEART RATE: 70 BPM | TEMPERATURE: 98 F | SYSTOLIC BLOOD PRESSURE: 120 MMHG | WEIGHT: 145.75 LBS | RESPIRATION RATE: 16 BRPM | OXYGEN SATURATION: 99 % | BODY MASS INDEX: 20.91 KG/M2

## 2023-04-05 DIAGNOSIS — I10 PRIMARY HYPERTENSION: Chronic | ICD-10-CM

## 2023-04-05 DIAGNOSIS — Z12.11 COLON CANCER SCREENING: ICD-10-CM

## 2023-04-05 DIAGNOSIS — E78.5 DYSLIPIDEMIA: ICD-10-CM

## 2023-04-05 DIAGNOSIS — F02.80 ALZHEIMER'S DEMENTIA WITHOUT BEHAVIORAL DISTURBANCE, PSYCHOTIC DISTURBANCE, MOOD DISTURBANCE, OR ANXIETY, UNSPECIFIED DEMENTIA SEVERITY, UNSPECIFIED TIMING OF DEMENTIA ONSET: Primary | ICD-10-CM

## 2023-04-05 DIAGNOSIS — R00.1 BRADYCARDIA: ICD-10-CM

## 2023-04-05 DIAGNOSIS — G30.9 ALZHEIMER'S DEMENTIA WITHOUT BEHAVIORAL DISTURBANCE, PSYCHOTIC DISTURBANCE, MOOD DISTURBANCE, OR ANXIETY, UNSPECIFIED DEMENTIA SEVERITY, UNSPECIFIED TIMING OF DEMENTIA ONSET: Primary | ICD-10-CM

## 2023-04-05 DIAGNOSIS — G20.A1 PARKINSON'S DISEASE: ICD-10-CM

## 2023-04-05 LAB
ALBUMIN SERPL BCP-MCNC: 3.9 G/DL (ref 3.5–5.2)
ALP SERPL-CCNC: 72 U/L (ref 55–135)
ALT SERPL W/O P-5'-P-CCNC: <5 U/L (ref 10–44)
ANION GAP SERPL CALC-SCNC: 14 MMOL/L (ref 8–16)
AST SERPL-CCNC: 16 U/L (ref 10–40)
BASOPHILS # BLD AUTO: 0.04 K/UL (ref 0–0.2)
BASOPHILS NFR BLD: 0.6 % (ref 0–1.9)
BILIRUB SERPL-MCNC: 0.7 MG/DL (ref 0.1–1)
BUN SERPL-MCNC: 16 MG/DL (ref 8–23)
CALCIUM SERPL-MCNC: 9.9 MG/DL (ref 8.7–10.5)
CHLORIDE SERPL-SCNC: 99 MMOL/L (ref 95–110)
CHOLEST SERPL-MCNC: 175 MG/DL (ref 120–199)
CHOLEST/HDLC SERPL: 3 {RATIO} (ref 2–5)
CO2 SERPL-SCNC: 30 MMOL/L (ref 23–29)
CREAT SERPL-MCNC: 1.2 MG/DL (ref 0.5–1.4)
DIFFERENTIAL METHOD: ABNORMAL
EOSINOPHIL # BLD AUTO: 0.4 K/UL (ref 0–0.5)
EOSINOPHIL NFR BLD: 6.3 % (ref 0–8)
ERYTHROCYTE [DISTWIDTH] IN BLOOD BY AUTOMATED COUNT: 11.9 % (ref 11.5–14.5)
EST. GFR  (NO RACE VARIABLE): >60 ML/MIN/1.73 M^2
GLUCOSE SERPL-MCNC: 80 MG/DL (ref 70–110)
HCT VFR BLD AUTO: 41.1 % (ref 40–54)
HDLC SERPL-MCNC: 59 MG/DL (ref 40–75)
HDLC SERPL: 33.7 % (ref 20–50)
HGB BLD-MCNC: 13.6 G/DL (ref 14–18)
IMM GRANULOCYTES # BLD AUTO: 0.01 K/UL (ref 0–0.04)
IMM GRANULOCYTES NFR BLD AUTO: 0.2 % (ref 0–0.5)
LDLC SERPL CALC-MCNC: 104.4 MG/DL (ref 63–159)
LYMPHOCYTES # BLD AUTO: 1.3 K/UL (ref 1–4.8)
LYMPHOCYTES NFR BLD: 20.3 % (ref 18–48)
MCH RBC QN AUTO: 30.5 PG (ref 27–31)
MCHC RBC AUTO-ENTMCNC: 33.1 G/DL (ref 32–36)
MCV RBC AUTO: 92 FL (ref 82–98)
MONOCYTES # BLD AUTO: 0.4 K/UL (ref 0.3–1)
MONOCYTES NFR BLD: 6.3 % (ref 4–15)
NEUTROPHILS # BLD AUTO: 4.3 K/UL (ref 1.8–7.7)
NEUTROPHILS NFR BLD: 66.3 % (ref 38–73)
NONHDLC SERPL-MCNC: 116 MG/DL
NRBC BLD-RTO: 0 /100 WBC
PLATELET # BLD AUTO: 259 K/UL (ref 150–450)
PMV BLD AUTO: 11.1 FL (ref 9.2–12.9)
POTASSIUM SERPL-SCNC: 3.3 MMOL/L (ref 3.5–5.1)
PROT SERPL-MCNC: 7.2 G/DL (ref 6–8.4)
RBC # BLD AUTO: 4.46 M/UL (ref 4.6–6.2)
SODIUM SERPL-SCNC: 143 MMOL/L (ref 136–145)
TRIGL SERPL-MCNC: 58 MG/DL (ref 30–150)
WBC # BLD AUTO: 6.4 K/UL (ref 3.9–12.7)

## 2023-04-05 PROCEDURE — 3288F FALL RISK ASSESSMENT DOCD: CPT | Mod: CPTII,S$GLB,, | Performed by: INTERNAL MEDICINE

## 2023-04-05 PROCEDURE — 4010F ACE/ARB THERAPY RXD/TAKEN: CPT | Mod: CPTII,S$GLB,, | Performed by: INTERNAL MEDICINE

## 2023-04-05 PROCEDURE — 3074F SYST BP LT 130 MM HG: CPT | Mod: CPTII,S$GLB,, | Performed by: INTERNAL MEDICINE

## 2023-04-05 PROCEDURE — 1101F PR PT FALLS ASSESS DOC 0-1 FALLS W/OUT INJ PAST YR: ICD-10-PCS | Mod: CPTII,S$GLB,, | Performed by: INTERNAL MEDICINE

## 2023-04-05 PROCEDURE — 1159F MED LIST DOCD IN RCRD: CPT | Mod: CPTII,S$GLB,, | Performed by: INTERNAL MEDICINE

## 2023-04-05 PROCEDURE — 1101F PT FALLS ASSESS-DOCD LE1/YR: CPT | Mod: CPTII,S$GLB,, | Performed by: INTERNAL MEDICINE

## 2023-04-05 PROCEDURE — 3074F PR MOST RECENT SYSTOLIC BLOOD PRESSURE < 130 MM HG: ICD-10-PCS | Mod: CPTII,S$GLB,, | Performed by: INTERNAL MEDICINE

## 2023-04-05 PROCEDURE — 85025 COMPLETE CBC W/AUTO DIFF WBC: CPT | Performed by: INTERNAL MEDICINE

## 2023-04-05 PROCEDURE — 80053 COMPREHEN METABOLIC PANEL: CPT | Performed by: INTERNAL MEDICINE

## 2023-04-05 PROCEDURE — 36415 COLL VENOUS BLD VENIPUNCTURE: CPT | Mod: PO | Performed by: INTERNAL MEDICINE

## 2023-04-05 PROCEDURE — 3288F PR FALLS RISK ASSESSMENT DOCUMENTED: ICD-10-PCS | Mod: CPTII,S$GLB,, | Performed by: INTERNAL MEDICINE

## 2023-04-05 PROCEDURE — 1126F AMNT PAIN NOTED NONE PRSNT: CPT | Mod: CPTII,S$GLB,, | Performed by: INTERNAL MEDICINE

## 2023-04-05 PROCEDURE — 99214 OFFICE O/P EST MOD 30 MIN: CPT | Mod: S$GLB,,, | Performed by: INTERNAL MEDICINE

## 2023-04-05 PROCEDURE — 3008F PR BODY MASS INDEX (BMI) DOCUMENTED: ICD-10-PCS | Mod: CPTII,S$GLB,, | Performed by: INTERNAL MEDICINE

## 2023-04-05 PROCEDURE — 4010F PR ACE/ARB THEARPY RXD/TAKEN: ICD-10-PCS | Mod: CPTII,S$GLB,, | Performed by: INTERNAL MEDICINE

## 2023-04-05 PROCEDURE — 99214 PR OFFICE/OUTPT VISIT, EST, LEVL IV, 30-39 MIN: ICD-10-PCS | Mod: S$GLB,,, | Performed by: INTERNAL MEDICINE

## 2023-04-05 PROCEDURE — 3079F DIAST BP 80-89 MM HG: CPT | Mod: CPTII,S$GLB,, | Performed by: INTERNAL MEDICINE

## 2023-04-05 PROCEDURE — 80061 LIPID PANEL: CPT | Performed by: INTERNAL MEDICINE

## 2023-04-05 PROCEDURE — 3008F BODY MASS INDEX DOCD: CPT | Mod: CPTII,S$GLB,, | Performed by: INTERNAL MEDICINE

## 2023-04-05 PROCEDURE — 3079F PR MOST RECENT DIASTOLIC BLOOD PRESSURE 80-89 MM HG: ICD-10-PCS | Mod: CPTII,S$GLB,, | Performed by: INTERNAL MEDICINE

## 2023-04-05 PROCEDURE — 1159F PR MEDICATION LIST DOCUMENTED IN MEDICAL RECORD: ICD-10-PCS | Mod: CPTII,S$GLB,, | Performed by: INTERNAL MEDICINE

## 2023-04-05 PROCEDURE — 1126F PR PAIN SEVERITY QUANTIFIED, NO PAIN PRESENT: ICD-10-PCS | Mod: CPTII,S$GLB,, | Performed by: INTERNAL MEDICINE

## 2023-04-05 PROCEDURE — 99999 PR PBB SHADOW E&M-EST. PATIENT-LVL V: CPT | Mod: PBBFAC,,, | Performed by: INTERNAL MEDICINE

## 2023-04-05 PROCEDURE — 99999 PR PBB SHADOW E&M-EST. PATIENT-LVL V: ICD-10-PCS | Mod: PBBFAC,,, | Performed by: INTERNAL MEDICINE

## 2023-04-05 RX ORDER — RIVASTIGMINE TARTRATE 1.5 MG/1
1.5 CAPSULE ORAL 2 TIMES DAILY
COMMUNITY
Start: 2023-03-17 | End: 2024-03-11

## 2023-04-05 RX ORDER — DONEPEZIL HYDROCHLORIDE 5 MG/1
5 TABLET, FILM COATED ORAL NIGHTLY
Status: ON HOLD | COMMUNITY
End: 2023-05-09 | Stop reason: HOSPADM

## 2023-04-05 NOTE — PROGRESS NOTES
Subjective:       Patient ID: Jose Long is a 67 y.o. male.    Chief Complaint: Follow-up (6m), Hypertension, Hyperlipidemia, and Dementia    Follow-up  Associated symptoms include arthralgias. Pertinent negatives include no abdominal pain, chest pain, chills, coughing, diaphoresis, fatigue, fever, headaches, joint swelling, myalgias, nausea, neck pain, numbness, rash, sore throat, vomiting or weakness.   Hypertension  Pertinent negatives include no chest pain, headaches, neck pain, palpitations or shortness of breath.   Hyperlipidemia  Pertinent negatives include no chest pain, myalgias or shortness of breath.   Past Medical History:   Diagnosis Date    Dyslipidemia     Hypertension     Multiple allergies      Past Surgical History:   Procedure Laterality Date    COLONOSCOPY N/A 6/29/2018    Procedure: COLONOSCOPY;  Surgeon: Kermit Leone MD;  Location: [facility];  Service: Endoscopy;  Laterality: N/A;     Family History   Problem Relation Age of Onset    Cataracts Mother     Hypertension Mother     Alzheimer's disease Mother     Diabetes Maternal Aunt     Macular degeneration Maternal Aunt     Diabetes Paternal Grandmother     Colon cancer Father     Melanoma Neg Hx      Social History     Socioeconomic History    Marital status: Single   Tobacco Use    Smoking status: Former    Smokeless tobacco: Never   Substance and Sexual Activity    Alcohol use: No    Drug use: No     Review of Systems   Constitutional:  Negative for activity change, appetite change, chills, diaphoresis, fatigue, fever and unexpected weight change.   HENT:  Negative for drooling, ear discharge, ear pain, facial swelling, hearing loss, mouth sores, nosebleeds, postnasal drip, rhinorrhea, sinus pressure, sneezing, sore throat, tinnitus, trouble swallowing and voice change.    Eyes:  Negative for photophobia, redness and visual disturbance.   Respiratory:  Negative for apnea, cough, choking, chest tightness, shortness of breath and  wheezing.    Cardiovascular:  Negative for chest pain, palpitations and leg swelling.   Gastrointestinal:  Negative for abdominal distention, abdominal pain, anal bleeding, blood in stool, constipation, diarrhea, nausea and vomiting.   Endocrine: Negative for cold intolerance, heat intolerance, polydipsia, polyphagia and polyuria.   Genitourinary:  Negative for difficulty urinating, dysuria, enuresis, flank pain, frequency, genital sores, hematuria and urgency.   Musculoskeletal:  Positive for arthralgias. Negative for back pain, gait problem, joint swelling, myalgias, neck pain and neck stiffness.   Skin:  Negative for color change, pallor, rash and wound.   Allergic/Immunologic: Negative for food allergies and immunocompromised state.   Neurological:  Negative for dizziness, tremors, seizures, syncope, facial asymmetry, speech difficulty, weakness, light-headedness, numbness and headaches.   Hematological:  Negative for adenopathy. Does not bruise/bleed easily.   Psychiatric/Behavioral:  Positive for confusion. Negative for agitation, behavioral problems, decreased concentration, dysphoric mood, hallucinations, self-injury, sleep disturbance and suicidal ideas. The patient is not nervous/anxious and is not hyperactive.      Objective:      Physical Exam  Vitals and nursing note reviewed.   Constitutional:       General: He is not in acute distress.     Appearance: Normal appearance. He is well-developed. He is not diaphoretic.   HENT:      Head: Normocephalic and atraumatic.      Mouth/Throat:      Pharynx: No oropharyngeal exudate.   Eyes:      General: No scleral icterus.     Pupils: Pupils are equal, round, and reactive to light.   Neck:      Thyroid: No thyromegaly.      Vascular: No carotid bruit or JVD.      Trachea: No tracheal deviation.   Cardiovascular:      Rate and Rhythm: Normal rate and regular rhythm.      Heart sounds: Normal heart sounds.   Pulmonary:      Effort: Pulmonary effort is normal. No  respiratory distress.      Breath sounds: Normal breath sounds. No wheezing or rales.   Chest:      Chest wall: No tenderness.   Abdominal:      General: Bowel sounds are normal. There is no distension.      Palpations: Abdomen is soft.      Tenderness: There is no abdominal tenderness. There is no guarding or rebound.   Musculoskeletal:         General: No tenderness. Normal range of motion.      Cervical back: Normal range of motion and neck supple.   Lymphadenopathy:      Cervical: No cervical adenopathy.   Skin:     General: Skin is warm and dry.      Coloration: Skin is not pale.      Findings: No erythema or rash.   Neurological:      Mental Status: He is alert and oriented to person, place, and time.   Psychiatric:         Behavior: Behavior normal.         Thought Content: Thought content normal.         Judgment: Judgment normal.       CMP  Sodium   Date Value Ref Range Status   03/07/2022 145 136 - 145 mmol/L Final     Potassium   Date Value Ref Range Status   03/07/2022 3.5 3.5 - 5.1 mmol/L Final     Chloride   Date Value Ref Range Status   03/07/2022 101 95 - 110 mmol/L Final     CO2   Date Value Ref Range Status   03/07/2022 29 23 - 29 mmol/L Final     Glucose   Date Value Ref Range Status   03/07/2022 90 70 - 110 mg/dL Final     BUN   Date Value Ref Range Status   03/07/2022 14 8 - 23 mg/dL Final     Creatinine   Date Value Ref Range Status   03/07/2022 1.3 0.5 - 1.4 mg/dL Final     Calcium   Date Value Ref Range Status   03/07/2022 9.4 8.7 - 10.5 mg/dL Final     Total Protein   Date Value Ref Range Status   03/07/2022 7.4 6.0 - 8.4 g/dL Final     Albumin   Date Value Ref Range Status   03/07/2022 4.2 3.5 - 5.2 g/dL Final     Total Bilirubin   Date Value Ref Range Status   03/07/2022 0.7 0.1 - 1.0 mg/dL Final     Comment:     For infants and newborns, interpretation of results should be based  on gestational age, weight and in agreement with clinical  observations.    Premature Infant recommended  reference ranges:  Up to 24 hours.............<8.0 mg/dL  Up to 48 hours............<12.0 mg/dL  3-5 days..................<15.0 mg/dL  6-29 days.................<15.0 mg/dL       Alkaline Phosphatase   Date Value Ref Range Status   03/07/2022 60 55 - 135 U/L Final     AST   Date Value Ref Range Status   03/07/2022 28 10 - 40 U/L Final     ALT   Date Value Ref Range Status   03/07/2022 29 10 - 44 U/L Final     Anion Gap   Date Value Ref Range Status   03/07/2022 15 8 - 16 mmol/L Final     eGFR if    Date Value Ref Range Status   03/07/2022 >60.0 >60 mL/min/1.73 m^2 Final     eGFR if non    Date Value Ref Range Status   03/07/2022 56.1 (A) >60 mL/min/1.73 m^2 Final     Comment:     Calculation used to obtain the estimated glomerular filtration  rate (eGFR) is the CKD-EPI equation.        Lab Results   Component Value Date    WBC 5.27 03/07/2022    HGB 14.3 03/07/2022    HCT 42.8 03/07/2022    MCV 91 03/07/2022     03/07/2022     Lab Results   Component Value Date    CHOL 173 06/08/2021     Lab Results   Component Value Date    HDL 60 06/08/2021     Lab Results   Component Value Date    LDLCALC 102.6 06/08/2021     Lab Results   Component Value Date    TRIG 52 06/08/2021     Lab Results   Component Value Date    CHOLHDL 34.7 06/08/2021     Lab Results   Component Value Date    TSH 1.136 03/07/2022     Lab Results   Component Value Date    HGBA1C 5.4 06/08/2021     Assessment:       1. Alzheimer's dementia without behavioral disturbance, psychotic disturbance, mood disturbance, or anxiety, unspecified dementia severity, unspecified timing of dementia onset    2. Bradycardia    3. Colon cancer screening    4. Dyslipidemia    5. Primary hypertension    6. Parkinson's disease        Plan:   Alzheimer's dementia without behavioral disturbance, psychotic disturbance, mood disturbance, or anxiety, unspecified dementia severity, unspecified timing of dementia  onset    Bradycardia    Colon cancer screening  -     Ambulatory referral/consult to Endo Procedure ; Future; Expected date: 04/06/2023    Dyslipidemia  -     Lipid Panel; Future; Expected date: 04/05/2023    Primary hypertension  -     Comprehensive Metabolic Panel; Future; Expected date: 04/05/2023  -     CBC Auto Differential; Future; Expected date: 04/05/2023    Parkinson's disease      Stable----------------continue meds-------------------as above----------------------sees neurology--------------f/u 6 months---------------

## 2023-04-06 ENCOUNTER — PATIENT MESSAGE (OUTPATIENT)
Dept: FAMILY MEDICINE | Facility: CLINIC | Age: 68
End: 2023-04-06
Payer: COMMERCIAL

## 2023-04-06 ENCOUNTER — TELEPHONE (OUTPATIENT)
Dept: FAMILY MEDICINE | Facility: CLINIC | Age: 68
End: 2023-04-06
Payer: COMMERCIAL

## 2023-04-06 DIAGNOSIS — D64.9 ANEMIA, UNSPECIFIED TYPE: Primary | ICD-10-CM

## 2023-04-17 ENCOUNTER — HOSPITAL ENCOUNTER (OUTPATIENT)
Dept: PREADMISSION TESTING | Facility: HOSPITAL | Age: 68
Discharge: HOME | End: 2023-04-17
Attending: INTERNAL MEDICINE
Payer: COMMERCIAL

## 2023-04-17 DIAGNOSIS — Z12.11 COLON CANCER SCREENING: Primary | ICD-10-CM

## 2023-04-21 ENCOUNTER — LAB VISIT (OUTPATIENT)
Dept: LAB | Facility: HOSPITAL | Age: 68
End: 2023-04-21
Attending: INTERNAL MEDICINE
Payer: COMMERCIAL

## 2023-04-21 DIAGNOSIS — I10 HYPERTENSION: ICD-10-CM

## 2023-04-21 DIAGNOSIS — D64.9 ANEMIA, UNSPECIFIED TYPE: ICD-10-CM

## 2023-04-21 LAB
ALBUMIN SERPL BCP-MCNC: 4 G/DL (ref 3.5–5.2)
ALP SERPL-CCNC: 69 U/L (ref 55–135)
ALT SERPL W/O P-5'-P-CCNC: 7 U/L (ref 10–44)
ANION GAP SERPL CALC-SCNC: 18 MMOL/L (ref 8–16)
AST SERPL-CCNC: 23 U/L (ref 10–40)
BASOPHILS # BLD AUTO: 0.04 K/UL (ref 0–0.2)
BASOPHILS NFR BLD: 0.6 % (ref 0–1.9)
BILIRUB SERPL-MCNC: 0.7 MG/DL (ref 0.1–1)
BUN SERPL-MCNC: 16 MG/DL (ref 8–23)
CALCIUM SERPL-MCNC: 9.6 MG/DL (ref 8.7–10.5)
CHLORIDE SERPL-SCNC: 101 MMOL/L (ref 95–110)
CO2 SERPL-SCNC: 26 MMOL/L (ref 23–29)
CREAT SERPL-MCNC: 1.3 MG/DL (ref 0.5–1.4)
DIFFERENTIAL METHOD: ABNORMAL
EOSINOPHIL # BLD AUTO: 0.3 K/UL (ref 0–0.5)
EOSINOPHIL NFR BLD: 3.9 % (ref 0–8)
ERYTHROCYTE [DISTWIDTH] IN BLOOD BY AUTOMATED COUNT: 11.9 % (ref 11.5–14.5)
EST. GFR  (NO RACE VARIABLE): 59.5 ML/MIN/1.73 M^2
FOLATE SERPL-MCNC: 11.3 NG/ML (ref 4–24)
GLUCOSE SERPL-MCNC: 67 MG/DL (ref 70–110)
HCT VFR BLD AUTO: 41.8 % (ref 40–54)
HGB BLD-MCNC: 13.2 G/DL (ref 14–18)
IMM GRANULOCYTES # BLD AUTO: 0.01 K/UL (ref 0–0.04)
IMM GRANULOCYTES NFR BLD AUTO: 0.2 % (ref 0–0.5)
IRON SERPL-MCNC: 107 UG/DL (ref 45–160)
LYMPHOCYTES # BLD AUTO: 1.2 K/UL (ref 1–4.8)
LYMPHOCYTES NFR BLD: 18.4 % (ref 18–48)
MCH RBC QN AUTO: 29.9 PG (ref 27–31)
MCHC RBC AUTO-ENTMCNC: 31.6 G/DL (ref 32–36)
MCV RBC AUTO: 95 FL (ref 82–98)
MONOCYTES # BLD AUTO: 0.4 K/UL (ref 0.3–1)
MONOCYTES NFR BLD: 6.6 % (ref 4–15)
NEUTROPHILS # BLD AUTO: 4.5 K/UL (ref 1.8–7.7)
NEUTROPHILS NFR BLD: 70.3 % (ref 38–73)
NRBC BLD-RTO: 0 /100 WBC
PLATELET # BLD AUTO: 278 K/UL (ref 150–450)
PMV BLD AUTO: 11 FL (ref 9.2–12.9)
POTASSIUM SERPL-SCNC: 3.4 MMOL/L (ref 3.5–5.1)
PROT SERPL-MCNC: 7.4 G/DL (ref 6–8.4)
RBC # BLD AUTO: 4.42 M/UL (ref 4.6–6.2)
SODIUM SERPL-SCNC: 145 MMOL/L (ref 136–145)
VIT B12 SERPL-MCNC: 359 PG/ML (ref 210–950)
WBC # BLD AUTO: 6.36 K/UL (ref 3.9–12.7)

## 2023-04-21 PROCEDURE — 82728 ASSAY OF FERRITIN: CPT | Performed by: INTERNAL MEDICINE

## 2023-04-21 PROCEDURE — 82746 ASSAY OF FOLIC ACID SERUM: CPT | Performed by: INTERNAL MEDICINE

## 2023-04-21 PROCEDURE — 82607 VITAMIN B-12: CPT | Performed by: INTERNAL MEDICINE

## 2023-04-21 PROCEDURE — 85025 COMPLETE CBC W/AUTO DIFF WBC: CPT | Performed by: INTERNAL MEDICINE

## 2023-04-21 PROCEDURE — 83540 ASSAY OF IRON: CPT | Performed by: INTERNAL MEDICINE

## 2023-04-21 PROCEDURE — 80053 COMPREHEN METABOLIC PANEL: CPT | Performed by: INTERNAL MEDICINE

## 2023-04-21 PROCEDURE — 36415 COLL VENOUS BLD VENIPUNCTURE: CPT | Mod: PO | Performed by: INTERNAL MEDICINE

## 2023-04-22 LAB — FERRITIN SERPL-MCNC: 202 NG/ML (ref 20–300)

## 2023-04-23 ENCOUNTER — TELEPHONE (OUTPATIENT)
Dept: FAMILY MEDICINE | Facility: CLINIC | Age: 68
End: 2023-04-23
Payer: COMMERCIAL

## 2023-04-23 DIAGNOSIS — D64.9 ANEMIA, UNSPECIFIED TYPE: Primary | ICD-10-CM

## 2023-04-24 ENCOUNTER — PATIENT MESSAGE (OUTPATIENT)
Dept: GASTROENTEROLOGY | Facility: CLINIC | Age: 68
End: 2023-04-24
Payer: COMMERCIAL

## 2023-05-05 ENCOUNTER — OFFICE VISIT (OUTPATIENT)
Dept: OPHTHALMOLOGY | Facility: CLINIC | Age: 68
End: 2023-05-05
Payer: COMMERCIAL

## 2023-05-05 DIAGNOSIS — H52.221 REGULAR ASTIGMATISM OF RIGHT EYE: ICD-10-CM

## 2023-05-05 DIAGNOSIS — H52.4 PRESBYOPIA: ICD-10-CM

## 2023-05-05 DIAGNOSIS — H04.129 DRY EYE: ICD-10-CM

## 2023-05-05 DIAGNOSIS — H25.13 NUCLEAR SCLEROSIS, BILATERAL: Primary | ICD-10-CM

## 2023-05-05 PROCEDURE — 4010F PR ACE/ARB THEARPY RXD/TAKEN: ICD-10-PCS | Mod: CPTII,S$GLB,, | Performed by: OPTOMETRIST

## 2023-05-05 PROCEDURE — 4010F ACE/ARB THERAPY RXD/TAKEN: CPT | Mod: CPTII,S$GLB,, | Performed by: OPTOMETRIST

## 2023-05-05 PROCEDURE — 99999 PR PBB SHADOW E&M-EST. PATIENT-LVL III: CPT | Mod: PBBFAC,,, | Performed by: OPTOMETRIST

## 2023-05-05 PROCEDURE — 1159F MED LIST DOCD IN RCRD: CPT | Mod: CPTII,S$GLB,, | Performed by: OPTOMETRIST

## 2023-05-05 PROCEDURE — 99999 PR PBB SHADOW E&M-EST. PATIENT-LVL III: ICD-10-PCS | Mod: PBBFAC,,, | Performed by: OPTOMETRIST

## 2023-05-05 PROCEDURE — 1160F RVW MEDS BY RX/DR IN RCRD: CPT | Mod: CPTII,S$GLB,, | Performed by: OPTOMETRIST

## 2023-05-05 PROCEDURE — 1160F PR REVIEW ALL MEDS BY PRESCRIBER/CLIN PHARMACIST DOCUMENTED: ICD-10-PCS | Mod: CPTII,S$GLB,, | Performed by: OPTOMETRIST

## 2023-05-05 PROCEDURE — 92014 COMPRE OPH EXAM EST PT 1/>: CPT | Mod: S$GLB,,, | Performed by: OPTOMETRIST

## 2023-05-05 PROCEDURE — 92014 PR EYE EXAM, EST PATIENT,COMPREHESV: ICD-10-PCS | Mod: S$GLB,,, | Performed by: OPTOMETRIST

## 2023-05-05 PROCEDURE — 1159F PR MEDICATION LIST DOCUMENTED IN MEDICAL RECORD: ICD-10-PCS | Mod: CPTII,S$GLB,, | Performed by: OPTOMETRIST

## 2023-05-05 NOTE — PROGRESS NOTES
"HPI     Annual Exam            Comments: Vision changes since last eye exam?: Pt states VA is "foggy",   started a few months ago, he was told he had cataracts but "apparently   they are not ready"  Any eye pain today: no but eyes are sticky  Other ocular symptoms: maybe new floaters but he isn't sure  Interested in contact lens fitting today? no           Last edited by Meenakshi Borjas MA on 5/5/2023 10:58 AM.            Assessment /Plan     For exam results, see Encounter Report.    Nuclear sclerosis, bilateral  Cataracts not significantly affecting activities of daily living and therefore surgery is not indicated at this time.   Will continue to monitor over the next 12 months. Pt to call or RTC with any significant change in vision prior to next visit.     Dry eye  Dryness likely causing patient's "foggy" symptoms  Start iVizia bid OU    Regular astigmatism of right eye  Presbyopia  Eyeglass Final Rx       Eyeglass Final Rx         Sphere Cylinder Axis Add    Right -0.50 +1.00 010 +2.50    Left +0.50   +2.50      Expiration Date: 5/5/2024    Comments: Signs are correct                  Normal mOCT today OU      RTC 1 yr for dilated eye exam or PRN if any problems.   Discussed above and answered questions.                     "

## 2023-05-08 ENCOUNTER — ANESTHESIA EVENT (OUTPATIENT)
Dept: ENDOSCOPY | Facility: HOSPITAL | Age: 68
End: 2023-05-08
Payer: COMMERCIAL

## 2023-05-08 NOTE — ANESTHESIA PREPROCEDURE EVALUATION
05/08/2023  Jose Long is a 67 y.o., male.    Past Medical History:   Diagnosis Date    Dyslipidemia     Hypertension     Multiple allergies      Past Surgical History:   Procedure Laterality Date    COLONOSCOPY N/A 6/29/2018    Procedure: COLONOSCOPY;  Surgeon: Kermit Leone MD;  Location: [facility];  Service: Endoscopy;  Laterality: N/A;     Patient Active Problem List   Diagnosis    HTN (hypertension)    Hypertension    Multiple allergies    Dyslipidemia    Personal history of colonic polyps    Colon cancer screening    Special screening for malignant neoplasms, colon    Colon polyp    Bradycardia    Memory changes    Alzheimer's dementia    Parkinson's disease      Latest Reference Range & Units 04/21/23 11:20   WBC 3.90 - 12.70 K/uL 6.36   RBC 4.60 - 6.20 M/uL 4.42 (L)   Hemoglobin 14.0 - 18.0 g/dL 13.2 (L)   Hematocrit 40.0 - 54.0 % 41.8   MCV 82 - 98 fL 95   MCH 27.0 - 31.0 pg 29.9   MCHC 32.0 - 36.0 g/dL 31.6 (L)   RDW 11.5 - 14.5 % 11.9   Platelets 150 - 450 K/uL 278   (L): Data is abnormally low   Latest Reference Range & Units 04/21/23 11:20   Sodium 136 - 145 mmol/L 145   Potassium 3.5 - 5.1 mmol/L 3.4 (L)   Chloride 95 - 110 mmol/L 101   CO2 23 - 29 mmol/L 26   Anion Gap 8 - 16 mmol/L 18 (H)   BUN 8 - 23 mg/dL 16   Creatinine 0.5 - 1.4 mg/dL 1.3   eGFR >60 mL/min/1.73 m^2 59.5 !   Glucose 70 - 110 mg/dL 67 (L)   Calcium 8.7 - 10.5 mg/dL 9.6   (L): Data is abnormally low  (H): Data is abnormally high  !: Data is abnormal  Pre-op Assessment    I have reviewed the Patient Summary Reports.     I have reviewed the Nursing Notes. I have reviewed the NPO Status.   I have reviewed the Medications.     Review of Systems  Anesthesia Hx:  No problems with previous Anesthesia parkinsons Denies Family Hx of Anesthesia complications.   Denies Personal Hx of Anesthesia complications.    Social:  No Alcohol Use, Former Smoker    Cardiovascular:   Hypertension    Hepatic/GI:   Bowel Prep.    Neurological:   Alzheimer's dementia; parkinson's disease   Psych:   Psychiatric History Alzheimer's dementia         Physical Exam  General: Well nourished, Cooperative, Alert and Oriented    Airway:  Mallampati: I / II/ I  Mouth Opening: Normal  TM Distance: Normal  Tongue: Normal  Neck ROM: Normal ROM    Dental:  Caps / Implants  Implants top  Chest/Lungs:  Normal Respiratory Rate        Anesthesia Plan  Type of Anesthesia, risks & benefits discussed:    Anesthesia Type: Gen Natural Airway  Intra-op Monitoring Plan: Standard ASA Monitors  Post Op Pain Control Plan: multimodal analgesia  Induction:  IV  Informed Consent: Informed consent signed with the Patient and all parties understand the risks and agree with anesthesia plan.  All questions answered.   ASA Score: 2  Day of Surgery Review of History & Physical: H&P Update referred to the surgeon/provider.    Ready For Surgery From Anesthesia Perspective.     .

## 2023-05-09 ENCOUNTER — ANESTHESIA (OUTPATIENT)
Dept: ENDOSCOPY | Facility: HOSPITAL | Age: 68
End: 2023-05-09
Payer: COMMERCIAL

## 2023-05-09 ENCOUNTER — HOSPITAL ENCOUNTER (OUTPATIENT)
Facility: HOSPITAL | Age: 68
Discharge: HOME | End: 2023-05-09
Attending: INTERNAL MEDICINE | Admitting: INTERNAL MEDICINE
Payer: COMMERCIAL

## 2023-05-09 DIAGNOSIS — Z86.010 PERSONAL HISTORY OF COLONIC POLYPS: ICD-10-CM

## 2023-05-09 DIAGNOSIS — D12.6 ADENOMATOUS POLYP OF COLON, UNSPECIFIED PART OF COLON: Primary | ICD-10-CM

## 2023-05-09 PROCEDURE — G0105 COLORECTAL SCRN; HI RISK IND: HCPCS | Performed by: INTERNAL MEDICINE

## 2023-05-09 PROCEDURE — 37000008 HC ANESTHESIA 1ST 15 MINUTES: Performed by: INTERNAL MEDICINE

## 2023-05-09 PROCEDURE — D9220A PRA ANESTHESIA: Mod: ,,, | Performed by: NURSE ANESTHETIST, CERTIFIED REGISTERED

## 2023-05-09 PROCEDURE — 25000003 PHARM REV CODE 250: Performed by: NURSE ANESTHETIST, CERTIFIED REGISTERED

## 2023-05-09 PROCEDURE — D9220A PRA ANESTHESIA: ICD-10-PCS | Mod: ,,, | Performed by: NURSE ANESTHETIST, CERTIFIED REGISTERED

## 2023-05-09 PROCEDURE — 63600175 PHARM REV CODE 636 W HCPCS: Performed by: INTERNAL MEDICINE

## 2023-05-09 PROCEDURE — G0105 COLORECTAL SCRN; HI RISK IND: HCPCS | Mod: ,,, | Performed by: INTERNAL MEDICINE

## 2023-05-09 PROCEDURE — 63600175 PHARM REV CODE 636 W HCPCS: Performed by: NURSE ANESTHETIST, CERTIFIED REGISTERED

## 2023-05-09 PROCEDURE — G0105 COLORECTAL SCRN; HI RISK IND: ICD-10-PCS | Mod: ,,, | Performed by: INTERNAL MEDICINE

## 2023-05-09 PROCEDURE — 37000009 HC ANESTHESIA EA ADD 15 MINS: Performed by: INTERNAL MEDICINE

## 2023-05-09 RX ORDER — PROPOFOL 10 MG/ML
VIAL (ML) INTRAVENOUS
Status: DISCONTINUED | OUTPATIENT
Start: 2023-05-09 | End: 2023-05-09

## 2023-05-09 RX ORDER — LIDOCAINE HYDROCHLORIDE 20 MG/ML
INJECTION INTRAVENOUS
Status: DISCONTINUED | OUTPATIENT
Start: 2023-05-09 | End: 2023-05-09

## 2023-05-09 RX ORDER — SODIUM CHLORIDE, SODIUM LACTATE, POTASSIUM CHLORIDE, CALCIUM CHLORIDE 600; 310; 30; 20 MG/100ML; MG/100ML; MG/100ML; MG/100ML
INJECTION, SOLUTION INTRAVENOUS CONTINUOUS
Status: DISCONTINUED | OUTPATIENT
Start: 2023-05-09 | End: 2023-05-09 | Stop reason: HOSPADM

## 2023-05-09 RX ADMIN — PROPOFOL 50 MG: 10 INJECTION, EMULSION INTRAVENOUS at 10:05

## 2023-05-09 RX ADMIN — SODIUM CHLORIDE, SODIUM LACTATE, POTASSIUM CHLORIDE, AND CALCIUM CHLORIDE: 600; 310; 30; 20 INJECTION, SOLUTION INTRAVENOUS at 10:05

## 2023-05-09 RX ADMIN — LIDOCAINE HYDROCHLORIDE 20 MG: 20 INJECTION INTRAVENOUS at 10:05

## 2023-05-09 NOTE — ANESTHESIA POSTPROCEDURE EVALUATION
Anesthesia Post Evaluation    Patient: Jose Long    Procedure(s) Performed: Procedure(s) (LRB):  COLONOSCOPY (N/A)    Final Anesthesia Type: general      Patient location during evaluation: GI PACU  Patient participation: Yes- Able to Participate  Level of consciousness: awake  Post-procedure vital signs: reviewed and stable  Pain management: adequate  Airway patency: patent    PONV status at discharge: No PONV  Anesthetic complications: no      Cardiovascular status: stable  Respiratory status: unassisted  Hydration status: euvolemic  Follow-up not needed.          Vitals Value Taken Time   /77 05/09/23 1123   Temp 36.6 °C (97.8 °F) 05/09/23 1103   Pulse 77 05/09/23 1123   Resp 18 05/09/23 1123   SpO2 100 % 05/09/23 1113         No case tracking events are documented in the log.      Pain/Jesús Score: Jesús Score: 9 (5/9/2023 11:04 AM)         housing concern

## 2023-05-09 NOTE — H&P
PRE PROCEDURE H&P    Patient Name: Jose Long  MRN: [id number]  : 1955  Date of Procedure:  2023  Referring Physician: Harshad Hernandez  Primary Physician: Harshad Hernandez MD  Procedure Physician: Jazmin Kraus MD       Planned Procedure: Colonoscopy  Diagnosis: previous adenomatous polyp  family history of colon cancer  Chief Complaint: Same as above    HPI: Patient is an 67 y.o. male is here for the above.     Last colonoscopy:   Family history: father   Anticoagulation: none     Past Medical History:   Past Medical History:   Diagnosis Date    Dyslipidemia     Hypertension     Multiple allergies         Past Surgical History:  Past Surgical History:   Procedure Laterality Date    COLONOSCOPY N/A 2018    Procedure: COLONOSCOPY;  Surgeon: Kermit Leone MD;  Location: [facility];  Service: Endoscopy;  Laterality: N/A;        Home Medications:  Prior to Admission medications    Medication Sig Start Date End Date Taking? Authorizing Provider   acetaminophen (TYLENOL ARTHRITIS PAIN ORAL) Take by mouth daily as needed.   Yes Historical Provider   amlodipine-benazepril 10-20mg (LOTREL) 10-20 mg per capsule Take 1 capsule by mouth once daily 23  Yes Harshad Hernandez MD   carbidopa-levodopa  mg (SINEMET)  mg per tablet Take 1 tablet by mouth 3 (three) times daily. 22  Yes Historical Provider   cetirizine (ZYRTEC) 10 MG tablet Take 1 tablet by mouth Daily. 1 Tablet Oral Every day.  To get over-the-counter   Yes Historical Provider   memantine (NAMENDA) 10 MG Tab Take 10 mg by mouth 2 (two) times daily. 10/16/22  Yes Historical Provider   rivastigmine tartrate (EXELON) 1.5 MG capsule Take 1.5 mg by mouth 2 (two) times daily. 3/17/23  Yes Historical Provider   donepeziL (ARICEPT) 5 MG tablet Take 5 mg by mouth every evening.    Historical Provider        Allergies:  Review of patient's allergies indicates:  No Known Allergies     Social History:   Social  "History     Socioeconomic History    Marital status: Single   Tobacco Use    Smoking status: Former    Smokeless tobacco: Never   Substance and Sexual Activity    Alcohol use: No    Drug use: No       Family History:  Family History   Problem Relation Age of Onset    Cataracts Mother     Hypertension Mother     Alzheimer's disease Mother     Diabetes Maternal Aunt     Macular degeneration Maternal Aunt     Diabetes Paternal Grandmother     Colon cancer Father     Melanoma Neg Hx        ROS: No acute cardiac events, no acute respiratory complaints.     Physical Exam (all patients):    BP (!) 154/70 (BP Location: Right arm, Patient Position: Sitting)   Pulse 74   Temp 97.5 °F (36.4 °C) (Temporal)   Resp 16   Ht 5' 10" (1.778 m)   Wt 62.6 kg (138 lb 0.1 oz)   SpO2 100%   BMI 19.80 kg/m²   Lungs: Clear to auscultation bilaterally, respirations unlabored  Heart: Regular rate and rhythm, S1 and S2 normal, no obvious murmurs  Abdomen:         Soft, non-tender, bowel sounds normal, no masses, no organomegaly    Lab Results   Component Value Date    WBC 6.36 04/21/2023    MCV 95 04/21/2023    RDW 11.9 04/21/2023     04/21/2023    GLU 67 (L) 04/21/2023    HGBA1C 5.4 06/08/2021    BUN 16 04/21/2023     04/21/2023    K 3.4 (L) 04/21/2023     04/21/2023        SEDATION PLAN: per anesthesia      History reviewed, vital signs satisfactory, cardiopulmonary status satisfactory, sedation options, risks and plans have been discussed with the patient  All their questions were answered and the patient agrees to the sedation procedures as planned and the patient is deemed an appropriate candidate for the sedation as planned.    Procedure explained to patient, informed consent obtained and placed in chart.    Jazmin Kraus  5/9/2023  10:37 AM    "

## 2023-05-09 NOTE — TRANSFER OF CARE
"Anesthesia Transfer of Care Note    Patient: Jose Long    Procedure(s) Performed: Procedure(s) (LRB):  COLONOSCOPY (N/A)    Patient location: PACU    Anesthesia Type: general    Transport from OR: Transported from OR on room air with adequate spontaneous ventilation    Post pain: adequate analgesia    Post assessment: no apparent anesthetic complications and tolerated procedure well    Post vital signs: stable    Level of consciousness: sedated and responds to stimulation    Nausea/Vomiting: no nausea/vomiting    Complications: none    Transfer of care protocol was followed      Last vitals:   Visit Vitals  BP (!) 151/70 (Patient Position: Lying)   Pulse 62   Temp 36.6 °C (97.8 °F) (Temporal)   Resp 18   Ht 5' 10" (1.778 m)   Wt 62.6 kg (138 lb 0.1 oz)   SpO2 99%   BMI 19.80 kg/m²     "

## 2023-05-09 NOTE — PROVATION PATIENT INSTRUCTIONS
Discharge Summary/Instructions after an Endoscopic Procedure  Patient Name: Jose Long  Patient MRN: [id number]  Patient YOB: 1955  Tuesday, May 9, 2023  Jazmin Kraus MD  Dear patient,  As a result of recent federal legislation (The Federal Cures Act), you may   receive lab or pathology results from your procedure in your [facility]   account before your physician is able to contact you. Your physician or   their representative will relay the results to you with their   recommendations at their soonest availability.  Thank you,  RESTRICTIONS:  During your procedure today, you received medications for sedation.  These   medications may affect your judgment, balance and coordination.  Therefore,   for 24 hours, you have the following restrictions:   - DO NOT drive a car, operate machinery, make legal/financial decisions,   sign important papers or drink alcohol.    ACTIVITY:  Today: no heavy lifting, straining or running due to procedural   sedation/anesthesia.  The following day: return to full activity including work.  DIET:  Eat and drink normally unless instructed otherwise.     TREATMENT FOR COMMON SIDE EFFECTS:  - Mild abdominal pain, nausea, belching, bloating or excessive gas:  rest,   eat lightly and use a heating pad.  - Sore Throat: treat with throat lozenges and/or gargle with warm salt   water.  - Because air was used during the procedure, expelling large amounts of air   from your rectum or belching is normal.  - If a bowel prep was taken, you may not have a bowel movement for 1-3 days.    This is normal.  SYMPTOMS TO WATCH FOR AND REPORT TO YOUR PHYSICIAN:  1. Abdominal pain or bloating, other than gas cramps.  2. Chest pain.  3. Back pain.  4. Signs of infection such as: chills or fever occurring within 24 hours   after the procedure.  5. Rectal bleeding, which would show as bright red, maroon, or black stools.   (A tablespoon of blood from the rectum is not serious, especially if    hemorrhoids are present.)  6. Vomiting.  7. Weakness or dizziness.  GO DIRECTLY TO THE NEAREST EMERGENCY ROOM IF YOU HAVE ANY OF THE FOLLOWING:      Difficulty breathing              Chills and/or fever over 101 F   Persistent vomiting and/or vomiting blood   Severe abdominal pain   Severe chest pain   Black, tarry stools   Bleeding- more than one tablespoon   Any other symptom or condition that you feel may need urgent attention  Your doctor recommends these additional instructions:  If any biopsies were taken, your doctors clinic will contact you in 1 to 2   weeks with any results.  - Patient has a contact number available for emergencies.  The signs and   symptoms of potential delayed complications were discussed with the   patient.  Return to normal activities tomorrow.  Written discharge   instructions were provided to the patient.   - Discharge patient to home (via wheelchair).   - Resume previous diet today.   - Continue present medications.   - Repeat colonoscopy in 5 years for screening purposes depending on overall   health at that time.  For questions, problems or results please call your physician Jazmin Kraus MD at Work:  [phone]  If you have any questions about the above instructions, call the GI   department at [phone] or call the endoscopy unit at [phone]   from 7am until 3 pm.  [facility] [place], EMERGENCY ROOM PHONE NUMBER:   [phone]  IF A COMPLICATION OR EMERGENCY SITUATION ARISES AND YOU ARE UNABLE TO REACH   YOUR PHYSICIAN - GO DIRECTLY TO THE EMERGENCY ROOM.  I have read or have had read to me these discharge instructions for my   procedure and have received a written copy.  I understand these   instructions and will follow-up with my physician if I have any questions.     __________________________________       _____________________________________  Nurse Signature                                          Patient/Designated   Responsible Party  Signature  MD Jazmin Hamilton MD  5/9/2023 11:03:30 AM  This report has been verified and signed electronically.  Dear patient,  As a result of recent federal legislation (The Federal Cures Act), you may   receive lab or pathology results from your procedure in your [facility]   account before your physician is able to contact you. Your physician or   their representative will relay the results to you with their   recommendations at their soonest availability.  Thank you,  PROVATION

## 2023-05-13 ENCOUNTER — PATIENT MESSAGE (OUTPATIENT)
Dept: OPHTHALMOLOGY | Facility: CLINIC | Age: 68
End: 2023-05-13
Payer: COMMERCIAL

## 2023-05-16 VITALS
SYSTOLIC BLOOD PRESSURE: 163 MMHG | OXYGEN SATURATION: 100 % | RESPIRATION RATE: 18 BRPM | DIASTOLIC BLOOD PRESSURE: 77 MMHG | HEIGHT: 70 IN | BODY MASS INDEX: 19.76 KG/M2 | TEMPERATURE: 98 F | HEART RATE: 75 BPM | WEIGHT: 138 LBS

## 2023-10-05 ENCOUNTER — LAB VISIT (OUTPATIENT)
Dept: LAB | Facility: HOSPITAL | Age: 68
End: 2023-10-05
Attending: INTERNAL MEDICINE
Payer: COMMERCIAL

## 2023-10-05 ENCOUNTER — OFFICE VISIT (OUTPATIENT)
Dept: FAMILY MEDICINE | Facility: CLINIC | Age: 68
End: 2023-10-05
Payer: COMMERCIAL

## 2023-10-05 VITALS
OXYGEN SATURATION: 98 % | WEIGHT: 140.63 LBS | HEART RATE: 70 BPM | TEMPERATURE: 98 F | DIASTOLIC BLOOD PRESSURE: 70 MMHG | BODY MASS INDEX: 20.18 KG/M2 | SYSTOLIC BLOOD PRESSURE: 128 MMHG | RESPIRATION RATE: 16 BRPM

## 2023-10-05 DIAGNOSIS — F02.80 ALZHEIMER'S DEMENTIA WITHOUT BEHAVIORAL DISTURBANCE, PSYCHOTIC DISTURBANCE, MOOD DISTURBANCE, OR ANXIETY, UNSPECIFIED DEMENTIA SEVERITY, UNSPECIFIED TIMING OF DEMENTIA ONSET: ICD-10-CM

## 2023-10-05 DIAGNOSIS — G30.9 ALZHEIMER'S DEMENTIA WITHOUT BEHAVIORAL DISTURBANCE, PSYCHOTIC DISTURBANCE, MOOD DISTURBANCE, OR ANXIETY, UNSPECIFIED DEMENTIA SEVERITY, UNSPECIFIED TIMING OF DEMENTIA ONSET: ICD-10-CM

## 2023-10-05 DIAGNOSIS — Z00.00 ROUTINE ADULT HEALTH MAINTENANCE: ICD-10-CM

## 2023-10-05 DIAGNOSIS — G20.A1 PARKINSON'S DISEASE, UNSPECIFIED WHETHER DYSKINESIA PRESENT, UNSPECIFIED WHETHER MANIFESTATIONS FLUCTUATE: ICD-10-CM

## 2023-10-05 DIAGNOSIS — I10 PRIMARY HYPERTENSION: Primary | Chronic | ICD-10-CM

## 2023-10-05 DIAGNOSIS — E78.5 DYSLIPIDEMIA: ICD-10-CM

## 2023-10-05 PROCEDURE — 3074F SYST BP LT 130 MM HG: CPT | Mod: CPTII,S$GLB,, | Performed by: INTERNAL MEDICINE

## 2023-10-05 PROCEDURE — 83540 ASSAY OF IRON: CPT | Performed by: INTERNAL MEDICINE

## 2023-10-05 PROCEDURE — 1101F PT FALLS ASSESS-DOCD LE1/YR: CPT | Mod: CPTII,S$GLB,, | Performed by: INTERNAL MEDICINE

## 2023-10-05 PROCEDURE — 3074F PR MOST RECENT SYSTOLIC BLOOD PRESSURE < 130 MM HG: ICD-10-PCS | Mod: CPTII,S$GLB,, | Performed by: INTERNAL MEDICINE

## 2023-10-05 PROCEDURE — 3078F DIAST BP <80 MM HG: CPT | Mod: CPTII,S$GLB,, | Performed by: INTERNAL MEDICINE

## 2023-10-05 PROCEDURE — 99397 PR PREVENTIVE VISIT,EST,65 & OVER: ICD-10-PCS | Mod: S$GLB,,, | Performed by: INTERNAL MEDICINE

## 2023-10-05 PROCEDURE — 1126F PR PAIN SEVERITY QUANTIFIED, NO PAIN PRESENT: ICD-10-PCS | Mod: CPTII,S$GLB,, | Performed by: INTERNAL MEDICINE

## 2023-10-05 PROCEDURE — 3008F BODY MASS INDEX DOCD: CPT | Mod: CPTII,S$GLB,, | Performed by: INTERNAL MEDICINE

## 2023-10-05 PROCEDURE — 1159F PR MEDICATION LIST DOCUMENTED IN MEDICAL RECORD: ICD-10-PCS | Mod: CPTII,S$GLB,, | Performed by: INTERNAL MEDICINE

## 2023-10-05 PROCEDURE — 3288F FALL RISK ASSESSMENT DOCD: CPT | Mod: CPTII,S$GLB,, | Performed by: INTERNAL MEDICINE

## 2023-10-05 PROCEDURE — 3288F PR FALLS RISK ASSESSMENT DOCUMENTED: ICD-10-PCS | Mod: CPTII,S$GLB,, | Performed by: INTERNAL MEDICINE

## 2023-10-05 PROCEDURE — 85025 COMPLETE CBC W/AUTO DIFF WBC: CPT | Performed by: INTERNAL MEDICINE

## 2023-10-05 PROCEDURE — 82728 ASSAY OF FERRITIN: CPT | Performed by: INTERNAL MEDICINE

## 2023-10-05 PROCEDURE — 84153 ASSAY OF PSA TOTAL: CPT | Performed by: INTERNAL MEDICINE

## 2023-10-05 PROCEDURE — 99397 PER PM REEVAL EST PAT 65+ YR: CPT | Mod: S$GLB,,, | Performed by: INTERNAL MEDICINE

## 2023-10-05 PROCEDURE — 99999 PR PBB SHADOW E&M-EST. PATIENT-LVL IV: ICD-10-PCS | Mod: PBBFAC,,, | Performed by: INTERNAL MEDICINE

## 2023-10-05 PROCEDURE — 4010F PR ACE/ARB THEARPY RXD/TAKEN: ICD-10-PCS | Mod: CPTII,S$GLB,, | Performed by: INTERNAL MEDICINE

## 2023-10-05 PROCEDURE — 84443 ASSAY THYROID STIM HORMONE: CPT | Performed by: INTERNAL MEDICINE

## 2023-10-05 PROCEDURE — 4010F ACE/ARB THERAPY RXD/TAKEN: CPT | Mod: CPTII,S$GLB,, | Performed by: INTERNAL MEDICINE

## 2023-10-05 PROCEDURE — 3008F PR BODY MASS INDEX (BMI) DOCUMENTED: ICD-10-PCS | Mod: CPTII,S$GLB,, | Performed by: INTERNAL MEDICINE

## 2023-10-05 PROCEDURE — 3078F PR MOST RECENT DIASTOLIC BLOOD PRESSURE < 80 MM HG: ICD-10-PCS | Mod: CPTII,S$GLB,, | Performed by: INTERNAL MEDICINE

## 2023-10-05 PROCEDURE — 99999 PR PBB SHADOW E&M-EST. PATIENT-LVL IV: CPT | Mod: PBBFAC,,, | Performed by: INTERNAL MEDICINE

## 2023-10-05 PROCEDURE — 1126F AMNT PAIN NOTED NONE PRSNT: CPT | Mod: CPTII,S$GLB,, | Performed by: INTERNAL MEDICINE

## 2023-10-05 PROCEDURE — 1159F MED LIST DOCD IN RCRD: CPT | Mod: CPTII,S$GLB,, | Performed by: INTERNAL MEDICINE

## 2023-10-05 PROCEDURE — 1101F PR PT FALLS ASSESS DOC 0-1 FALLS W/OUT INJ PAST YR: ICD-10-PCS | Mod: CPTII,S$GLB,, | Performed by: INTERNAL MEDICINE

## 2023-10-05 PROCEDURE — 36415 COLL VENOUS BLD VENIPUNCTURE: CPT | Mod: PO | Performed by: INTERNAL MEDICINE

## 2023-10-05 NOTE — PROGRESS NOTES
Subjective:       Patient ID: Jose Long is a 67 y.o. male.    Chief Complaint: Follow-up (6m), Hypertension, Hyperlipidemia, and Dementia    Follow-up  Associated symptoms include arthralgias. Pertinent negatives include no abdominal pain, chest pain, chills, coughing, diaphoresis, fatigue, fever, headaches, joint swelling, myalgias, nausea, neck pain, numbness, rash, sore throat, vomiting or weakness.   Hypertension  Pertinent negatives include no chest pain, headaches, neck pain, palpitations or shortness of breath.   Hyperlipidemia  Pertinent negatives include no chest pain, myalgias or shortness of breath.     Past Medical History:   Diagnosis Date    Dyslipidemia     Hypertension     Multiple allergies      Past Surgical History:   Procedure Laterality Date    COLONOSCOPY N/A 6/29/2018    Procedure: COLONOSCOPY;  Surgeon: Kermit Leone MD;  Location: [facility];  Service: Endoscopy;  Laterality: N/A;    COLONOSCOPY N/A 5/9/2023    Procedure: COLONOSCOPY;  Surgeon: Jazmin Kraus MD;  Location: [facility] ENDO;  Service: Endoscopy;  Laterality: N/A;     Family History   Problem Relation Age of Onset    Cataracts Mother     Hypertension Mother     Alzheimer's disease Mother     Diabetes Maternal Aunt     Macular degeneration Maternal Aunt     Diabetes Paternal Grandmother     Colon cancer Father     Melanoma Neg Hx      Social History     Socioeconomic History    Marital status: Single   Tobacco Use    Smoking status: Former    Smokeless tobacco: Never   Substance and Sexual Activity    Alcohol use: No    Drug use: No     Social Determinants of Health     Financial Resource Strain: Low Risk  (6/19/2019)    Overall Financial Resource Strain (CARDIA)     Difficulty of Paying Living Expenses: Not hard at all   Food Insecurity: No Food Insecurity (6/19/2019)    Hunger Vital Sign     Worried About Running Out of Food in the Last Year: Never true     Ran Out of Food in the Last Year: Never true   Transportation  Needs: Unknown (6/19/2019)    PRAPARE - Transportation     Lack of Transportation (Medical): No   Physical Activity: Sufficiently Active (6/19/2019)    Exercise Vital Sign     Days of Exercise per Week: 2 days     Minutes of Exercise per Session: 90 min   Stress: No Stress Concern Present (6/19/2019)    [employer] - Occupational Stress Questionnaire     Feeling of Stress : Not at all   Social Connections: Unknown (6/19/2019)    Social Connection and Isolation Panel [NHANES]     Frequency of Communication with Friends and Family: More than three times a week     Frequency of Social Gatherings with Friends and Family: More than three times a week     Active Member of Clubs or Organizations: No     Attends Club or Organization Meetings: Never     Marital Status: Never      Review of Systems   Constitutional:  Negative for activity change, appetite change, chills, diaphoresis, fatigue, fever and unexpected weight change.   HENT:  Negative for drooling, ear discharge, ear pain, facial swelling, hearing loss, mouth sores, nosebleeds, postnasal drip, rhinorrhea, sinus pressure, sneezing, sore throat, tinnitus, trouble swallowing and voice change.    Eyes:  Negative for photophobia, redness and visual disturbance.   Respiratory:  Negative for apnea, cough, choking, chest tightness, shortness of breath and wheezing.    Cardiovascular:  Negative for chest pain, palpitations and leg swelling.   Gastrointestinal:  Negative for abdominal distention, abdominal pain, anal bleeding, blood in stool, constipation, diarrhea, nausea and vomiting.   Endocrine: Negative for cold intolerance, heat intolerance, polydipsia, polyphagia and polyuria.   Genitourinary:  Negative for difficulty urinating, dysuria, enuresis, flank pain, frequency, genital sores, hematuria and urgency.   Musculoskeletal:  Positive for arthralgias. Negative for back pain, gait problem, joint swelling, myalgias, neck pain and  neck stiffness.   Skin:  Negative for color change, pallor, rash and wound.   Allergic/Immunologic: Negative for food allergies and immunocompromised state.   Neurological:  Negative for dizziness, tremors, seizures, syncope, facial asymmetry, speech difficulty, weakness, light-headedness, numbness and headaches.   Hematological:  Negative for adenopathy. Does not bruise/bleed easily.   Psychiatric/Behavioral:  Negative for agitation, behavioral problems, confusion, decreased concentration, dysphoric mood, hallucinations, self-injury, sleep disturbance and suicidal ideas. The patient is not nervous/anxious and is not hyperactive.        Objective:      Physical Exam  Vitals and nursing note reviewed.   Constitutional:       General: He is not in acute distress.     Appearance: Normal appearance. He is well-developed. He is not diaphoretic.   HENT:      Head: Normocephalic and atraumatic.      Mouth/Throat:      Pharynx: No oropharyngeal exudate.   Eyes:      General: No scleral icterus.     Pupils: Pupils are equal, round, and reactive to light.   Neck:      Thyroid: No thyromegaly.      Vascular: No carotid bruit or JVD.      Trachea: No tracheal deviation.   Cardiovascular:      Rate and Rhythm: Normal rate and regular rhythm.      Heart sounds: Normal heart sounds.   Pulmonary:      Effort: Pulmonary effort is normal. No respiratory distress.      Breath sounds: Normal breath sounds. No wheezing or rales.   Chest:      Chest wall: No tenderness.   Abdominal:      General: Bowel sounds are normal. There is no distension.      Palpations: Abdomen is soft.      Tenderness: There is no abdominal tenderness. There is no guarding or rebound.   Musculoskeletal:         General: No tenderness. Normal range of motion.      Cervical back: Normal range of motion and neck supple.   Lymphadenopathy:      Cervical: No cervical adenopathy.   Skin:     General: Skin is warm and dry.      Coloration: Skin is not pale.       Findings: No erythema or rash.   Neurological:      Mental Status: He is alert and oriented to person, place, and time.   Psychiatric:         Behavior: Behavior normal.         Thought Content: Thought content normal.         Judgment: Judgment normal.         CMP  Sodium   Date Value Ref Range Status   04/21/2023 145 136 - 145 mmol/L Final     Potassium   Date Value Ref Range Status   04/21/2023 3.4 (L) 3.5 - 5.1 mmol/L Final     Chloride   Date Value Ref Range Status   04/21/2023 101 95 - 110 mmol/L Final     CO2   Date Value Ref Range Status   04/21/2023 26 23 - 29 mmol/L Final     Glucose   Date Value Ref Range Status   04/21/2023 67 (L) 70 - 110 mg/dL Final     BUN   Date Value Ref Range Status   04/21/2023 16 8 - 23 mg/dL Final     Creatinine   Date Value Ref Range Status   04/21/2023 1.3 0.5 - 1.4 mg/dL Final     Calcium   Date Value Ref Range Status   04/21/2023 9.6 8.7 - 10.5 mg/dL Final     Total Protein   Date Value Ref Range Status   04/21/2023 7.4 6.0 - 8.4 g/dL Final     Albumin   Date Value Ref Range Status   04/21/2023 4.0 3.5 - 5.2 g/dL Final     Total Bilirubin   Date Value Ref Range Status   04/21/2023 0.7 0.1 - 1.0 mg/dL Final     Comment:     For infants and newborns, interpretation of results should be based  on gestational age, weight and in agreement with clinical  observations.    Premature Infant recommended reference ranges:  Up to 24 hours.............<8.0 mg/dL  Up to 48 hours............<12.0 mg/dL  3-5 days..................<15.0 mg/dL  6-29 days.................<15.0 mg/dL       Alkaline Phosphatase   Date Value Ref Range Status   04/21/2023 69 55 - 135 U/L Final     AST   Date Value Ref Range Status   04/21/2023 23 10 - 40 U/L Final     ALT   Date Value Ref Range Status   04/21/2023 7 (L) 10 - 44 U/L Final     Anion Gap   Date Value Ref Range Status   04/21/2023 18 (H) 8 - 16 mmol/L Final     eGFR if    Date Value Ref Range Status   03/07/2022 >60.0 >60 mL/min/1.73  m^2 Final     eGFR if non    Date Value Ref Range Status   03/07/2022 56.1 (A) >60 mL/min/1.73 m^2 Final     Comment:     Calculation used to obtain the estimated glomerular filtration  rate (eGFR) is the CKD-EPI equation.        Lab Results   Component Value Date    WBC 6.36 04/21/2023    HGB 13.2 (L) 04/21/2023    HCT 41.8 04/21/2023    MCV 95 04/21/2023     04/21/2023     Lab Results   Component Value Date    CHOL 175 04/05/2023     Lab Results   Component Value Date    HDL 59 04/05/2023     Lab Results   Component Value Date    LDLCALC 104.4 04/05/2023     Lab Results   Component Value Date    TRIG 58 04/05/2023     Lab Results   Component Value Date    CHOLHDL 33.7 04/05/2023     Lab Results   Component Value Date    TSH 1.136 03/07/2022     Lab Results   Component Value Date    HGBA1C 5.4 06/08/2021     Assessment:       1. Primary hypertension    2. Dyslipidemia    3. Alzheimer's dementia without behavioral disturbance, psychotic disturbance, mood disturbance, or anxiety, unspecified dementia severity, unspecified timing of dementia onset    4. Parkinson's disease, unspecified whether dyskinesia present, unspecified whether manifestations fluctuate    5. Routine adult health maintenance        Plan:   Primary hypertension    Dyslipidemia    Alzheimer's dementia without behavioral disturbance, psychotic disturbance, mood disturbance, or anxiety, unspecified dementia severity, unspecified timing of dementia onset-----------------sees neurology-----------------------    Parkinson's disease, unspecified whether dyskinesia present, unspecified whether manifestations fluctuate    Routine adult health maintenance  -     CBC Auto Differential; Future; Expected date: 10/05/2023  -     Ferritin; Future; Expected date: 10/05/2023  -     Iron; Future; Expected date: 10/05/2023  -     TSH; Future; Expected date: 10/05/2023  -     PSA, Screening; Future; Expected date:  10/05/2023      Stable--------------continue current meds--------------------as above------------------------f/u 4 months--------

## 2023-10-06 LAB
BASOPHILS # BLD AUTO: 0.03 K/UL (ref 0–0.2)
BASOPHILS NFR BLD: 0.4 % (ref 0–1.9)
COMPLEXED PSA SERPL-MCNC: 2.2 NG/ML (ref 0–4)
DIFFERENTIAL METHOD: ABNORMAL
EOSINOPHIL # BLD AUTO: 0.1 K/UL (ref 0–0.5)
EOSINOPHIL NFR BLD: 0.7 % (ref 0–8)
ERYTHROCYTE [DISTWIDTH] IN BLOOD BY AUTOMATED COUNT: 12.1 % (ref 11.5–14.5)
FERRITIN SERPL-MCNC: 196 NG/ML (ref 20–300)
HCT VFR BLD AUTO: 40.8 % (ref 40–54)
HGB BLD-MCNC: 13.8 G/DL (ref 14–18)
IMM GRANULOCYTES # BLD AUTO: 0.01 K/UL (ref 0–0.04)
IMM GRANULOCYTES NFR BLD AUTO: 0.1 % (ref 0–0.5)
IRON SERPL-MCNC: 70 UG/DL (ref 45–160)
LYMPHOCYTES # BLD AUTO: 0.6 K/UL (ref 1–4.8)
LYMPHOCYTES NFR BLD: 7.3 % (ref 18–48)
MCH RBC QN AUTO: 30.5 PG (ref 27–31)
MCHC RBC AUTO-ENTMCNC: 33.8 G/DL (ref 32–36)
MCV RBC AUTO: 90 FL (ref 82–98)
MONOCYTES # BLD AUTO: 0.5 K/UL (ref 0.3–1)
MONOCYTES NFR BLD: 5.8 % (ref 4–15)
NEUTROPHILS # BLD AUTO: 7 K/UL (ref 1.8–7.7)
NEUTROPHILS NFR BLD: 85.7 % (ref 38–73)
NRBC BLD-RTO: 0 /100 WBC
PLATELET # BLD AUTO: 284 K/UL (ref 150–450)
PMV BLD AUTO: 11.1 FL (ref 9.2–12.9)
RBC # BLD AUTO: 4.52 M/UL (ref 4.6–6.2)
TSH SERPL DL<=0.005 MIU/L-ACNC: 0.74 UIU/ML (ref 0.4–4)
WBC # BLD AUTO: 8.22 K/UL (ref 3.9–12.7)

## 2023-11-07 ENCOUNTER — OFFICE VISIT (OUTPATIENT)
Dept: FAMILY MEDICINE | Facility: CLINIC | Age: 68
End: 2023-11-07
Payer: COMMERCIAL

## 2023-11-07 VITALS
TEMPERATURE: 98 F | WEIGHT: 138 LBS | OXYGEN SATURATION: 99 % | DIASTOLIC BLOOD PRESSURE: 70 MMHG | BODY MASS INDEX: 19.8 KG/M2 | SYSTOLIC BLOOD PRESSURE: 124 MMHG | RESPIRATION RATE: 16 BRPM | HEART RATE: 65 BPM

## 2023-11-07 DIAGNOSIS — L29.9 ITCHY SCALP: Primary | ICD-10-CM

## 2023-11-07 PROCEDURE — 99213 OFFICE O/P EST LOW 20 MIN: CPT | Mod: S$GLB,,, | Performed by: INTERNAL MEDICINE

## 2023-11-07 PROCEDURE — 3074F SYST BP LT 130 MM HG: CPT | Mod: CPTII,S$GLB,, | Performed by: INTERNAL MEDICINE

## 2023-11-07 PROCEDURE — 4010F PR ACE/ARB THEARPY RXD/TAKEN: ICD-10-PCS | Mod: CPTII,S$GLB,, | Performed by: INTERNAL MEDICINE

## 2023-11-07 PROCEDURE — 1159F MED LIST DOCD IN RCRD: CPT | Mod: CPTII,S$GLB,, | Performed by: INTERNAL MEDICINE

## 2023-11-07 PROCEDURE — 3008F PR BODY MASS INDEX (BMI) DOCUMENTED: ICD-10-PCS | Mod: CPTII,S$GLB,, | Performed by: INTERNAL MEDICINE

## 2023-11-07 PROCEDURE — 3288F PR FALLS RISK ASSESSMENT DOCUMENTED: ICD-10-PCS | Mod: CPTII,S$GLB,, | Performed by: INTERNAL MEDICINE

## 2023-11-07 PROCEDURE — 99213 PR OFFICE/OUTPT VISIT, EST, LEVL III, 20-29 MIN: ICD-10-PCS | Mod: S$GLB,,, | Performed by: INTERNAL MEDICINE

## 2023-11-07 PROCEDURE — 3078F DIAST BP <80 MM HG: CPT | Mod: CPTII,S$GLB,, | Performed by: INTERNAL MEDICINE

## 2023-11-07 PROCEDURE — 1126F PR PAIN SEVERITY QUANTIFIED, NO PAIN PRESENT: ICD-10-PCS | Mod: CPTII,S$GLB,, | Performed by: INTERNAL MEDICINE

## 2023-11-07 PROCEDURE — 3008F BODY MASS INDEX DOCD: CPT | Mod: CPTII,S$GLB,, | Performed by: INTERNAL MEDICINE

## 2023-11-07 PROCEDURE — 1101F PR PT FALLS ASSESS DOC 0-1 FALLS W/OUT INJ PAST YR: ICD-10-PCS | Mod: CPTII,S$GLB,, | Performed by: INTERNAL MEDICINE

## 2023-11-07 PROCEDURE — 3078F PR MOST RECENT DIASTOLIC BLOOD PRESSURE < 80 MM HG: ICD-10-PCS | Mod: CPTII,S$GLB,, | Performed by: INTERNAL MEDICINE

## 2023-11-07 PROCEDURE — 99999 PR PBB SHADOW E&M-EST. PATIENT-LVL IV: CPT | Mod: PBBFAC,,, | Performed by: INTERNAL MEDICINE

## 2023-11-07 PROCEDURE — 4010F ACE/ARB THERAPY RXD/TAKEN: CPT | Mod: CPTII,S$GLB,, | Performed by: INTERNAL MEDICINE

## 2023-11-07 PROCEDURE — 3074F PR MOST RECENT SYSTOLIC BLOOD PRESSURE < 130 MM HG: ICD-10-PCS | Mod: CPTII,S$GLB,, | Performed by: INTERNAL MEDICINE

## 2023-11-07 PROCEDURE — 99999 PR PBB SHADOW E&M-EST. PATIENT-LVL IV: ICD-10-PCS | Mod: PBBFAC,,, | Performed by: INTERNAL MEDICINE

## 2023-11-07 PROCEDURE — 1126F AMNT PAIN NOTED NONE PRSNT: CPT | Mod: CPTII,S$GLB,, | Performed by: INTERNAL MEDICINE

## 2023-11-07 PROCEDURE — 1101F PT FALLS ASSESS-DOCD LE1/YR: CPT | Mod: CPTII,S$GLB,, | Performed by: INTERNAL MEDICINE

## 2023-11-07 PROCEDURE — 1159F PR MEDICATION LIST DOCUMENTED IN MEDICAL RECORD: ICD-10-PCS | Mod: CPTII,S$GLB,, | Performed by: INTERNAL MEDICINE

## 2023-11-07 PROCEDURE — 3288F FALL RISK ASSESSMENT DOCD: CPT | Mod: CPTII,S$GLB,, | Performed by: INTERNAL MEDICINE

## 2023-11-07 RX ORDER — KETOCONAZOLE 20 MG/ML
SHAMPOO, SUSPENSION TOPICAL
Qty: 120 ML | Refills: 2 | Status: SHIPPED | OUTPATIENT
Start: 2023-11-09

## 2023-11-07 NOTE — PROGRESS NOTES
Subjective:       Patient ID: Jose Long is a 67 y.o. male.    Chief Complaint: itchy scalp    3 weeks itchy scalp.           Past Medical History:   Diagnosis Date    Dyslipidemia     Hypertension     Multiple allergies      Past Surgical History:   Procedure Laterality Date    COLONOSCOPY N/A 6/29/2018    Procedure: COLONOSCOPY;  Surgeon: Kermit Leone MD;  Location: [facility] ENDO;  Service: Endoscopy;  Laterality: N/A;    COLONOSCOPY N/A 5/9/2023    Procedure: COLONOSCOPY;  Surgeon: Jazmin Kraus MD;  Location: [facility] ENDO;  Service: Endoscopy;  Laterality: N/A;     Family History   Problem Relation Age of Onset    Cataracts Mother     Hypertension Mother     Alzheimer's disease Mother     Diabetes Maternal Aunt     Macular degeneration Maternal Aunt     Diabetes Paternal Grandmother     Colon cancer Father     Melanoma Neg Hx      Social History     Socioeconomic History    Marital status: Single   Tobacco Use    Smoking status: Former    Smokeless tobacco: Never   Substance and Sexual Activity    Alcohol use: No    Drug use: No     Social Determinants of Health     Financial Resource Strain: Low Risk  (6/19/2019)    Overall Financial Resource Strain (CARDIA)     Difficulty of Paying Living Expenses: Not hard at all   Food Insecurity: No Food Insecurity (6/19/2019)    Hunger Vital Sign     Worried About Running Out of Food in the Last Year: Never true     Ran Out of Food in the Last Year: Never true   Transportation Needs: Unknown (6/19/2019)    PRAPARE - Transportation     Lack of Transportation (Medical): No   Physical Activity: Sufficiently Active (6/19/2019)    Exercise Vital Sign     Days of Exercise per Week: 2 days     Minutes of Exercise per Session: 90 min   Stress: No Stress Concern Present (6/19/2019)    [employer] - Occupational Stress Questionnaire     Feeling of Stress : Not at all   Social Connections: Unknown (6/19/2019)    Social Connection and Isolation Panel  [NHANES]     Frequency of Communication with Friends and Family: More than three times a week     Frequency of Social Gatherings with Friends and Family: More than three times a week     Active Member of Clubs or Organizations: No     Attends Club or Organization Meetings: Never     Marital Status: Never      Review of Systems   Constitutional:  Negative for chills and fever.   HENT: Negative.     Respiratory:  Negative for apnea, cough, choking, chest tightness, shortness of breath, wheezing and stridor.    Cardiovascular:  Negative for chest pain and palpitations.   Gastrointestinal:  Negative for abdominal pain, nausea and vomiting.   Neurological:  Negative for dizziness and weakness.   Psychiatric/Behavioral:  Negative for agitation, behavioral problems, confusion and self-injury. The patient is not nervous/anxious.        Objective:      Physical Exam  Vitals and nursing note reviewed.   Constitutional:       General: He is not in acute distress.     Appearance: Normal appearance. He is well-developed. He is not diaphoretic.   HENT:      Head: Normocephalic and atraumatic.      Mouth/Throat:      Pharynx: No oropharyngeal exudate.   Eyes:      General: No scleral icterus.     Pupils: Pupils are equal, round, and reactive to light.   Neck:      Thyroid: No thyromegaly.      Vascular: No carotid bruit or JVD.      Trachea: No tracheal deviation.   Cardiovascular:      Rate and Rhythm: Normal rate and regular rhythm.      Heart sounds: Normal heart sounds.   Pulmonary:      Effort: Pulmonary effort is normal. No respiratory distress.      Breath sounds: Normal breath sounds. No wheezing or rales.   Chest:      Chest wall: No tenderness.   Abdominal:      General: Bowel sounds are normal. There is no distension.      Palpations: Abdomen is soft.      Tenderness: There is no abdominal tenderness. There is no guarding or rebound.   Musculoskeletal:         General: No tenderness. Normal range of motion.       Cervical back: Normal range of motion and neck supple.   Lymphadenopathy:      Cervical: No cervical adenopathy.   Skin:     General: Skin is warm and dry.      Coloration: Skin is not pale.      Findings: No erythema or rash.      Comments: Dry flaky skin posterior scalp---------   Neurological:      Mental Status: He is alert and oriented to person, place, and time.   Psychiatric:         Behavior: Behavior normal.         Thought Content: Thought content normal.         Judgment: Judgment normal.         CMP  Sodium   Date Value Ref Range Status   04/21/2023 145 136 - 145 mmol/L Final     Potassium   Date Value Ref Range Status   04/21/2023 3.4 (L) 3.5 - 5.1 mmol/L Final     Chloride   Date Value Ref Range Status   04/21/2023 101 95 - 110 mmol/L Final     CO2   Date Value Ref Range Status   04/21/2023 26 23 - 29 mmol/L Final     Glucose   Date Value Ref Range Status   04/21/2023 67 (L) 70 - 110 mg/dL Final     BUN   Date Value Ref Range Status   04/21/2023 16 8 - 23 mg/dL Final     Creatinine   Date Value Ref Range Status   04/21/2023 1.3 0.5 - 1.4 mg/dL Final     Calcium   Date Value Ref Range Status   04/21/2023 9.6 8.7 - 10.5 mg/dL Final     Total Protein   Date Value Ref Range Status   04/21/2023 7.4 6.0 - 8.4 g/dL Final     Albumin   Date Value Ref Range Status   04/21/2023 4.0 3.5 - 5.2 g/dL Final     Total Bilirubin   Date Value Ref Range Status   04/21/2023 0.7 0.1 - 1.0 mg/dL Final     Comment:     For infants and newborns, interpretation of results should be based  on gestational age, weight and in agreement with clinical  observations.    Premature Infant recommended reference ranges:  Up to 24 hours.............<8.0 mg/dL  Up to 48 hours............<12.0 mg/dL  3-5 days..................<15.0 mg/dL  6-29 days.................<15.0 mg/dL       Alkaline Phosphatase   Date Value Ref Range Status   04/21/2023 69 55 - 135 U/L Final     AST   Date Value Ref Range Status   04/21/2023 23 10 - 40 U/L Final      ALT   Date Value Ref Range Status   04/21/2023 7 (L) 10 - 44 U/L Final     Anion Gap   Date Value Ref Range Status   04/21/2023 18 (H) 8 - 16 mmol/L Final     eGFR if    Date Value Ref Range Status   03/07/2022 >60.0 >60 mL/min/1.73 m^2 Final     eGFR if non    Date Value Ref Range Status   03/07/2022 56.1 (A) >60 mL/min/1.73 m^2 Final     Comment:     Calculation used to obtain the estimated glomerular filtration  rate (eGFR) is the CKD-EPI equation.        Lab Results   Component Value Date    WBC 8.22 10/05/2023    HGB 13.8 (L) 10/05/2023    HCT 40.8 10/05/2023    MCV 90 10/05/2023     10/05/2023     Lab Results   Component Value Date    CHOL 175 04/05/2023     Lab Results   Component Value Date    HDL 59 04/05/2023     Lab Results   Component Value Date    LDLCALC 104.4 04/05/2023     Lab Results   Component Value Date    TRIG 58 04/05/2023     Lab Results   Component Value Date    CHOLHDL 33.7 04/05/2023     Lab Results   Component Value Date    TSH 0.740 10/05/2023     Lab Results   Component Value Date    HGBA1C 5.4 06/08/2021     Assessment:       1. Itchy scalp        Plan:   Itchy scalp  -     Ambulatory referral/consult to Dermatology; Future; Expected date: 11/14/2023    Other orders  -     ketoconazole (NIZORAL) 2 % shampoo; Apply topically twice a week.  Dispense: 120 mL; Refill: 2      As above-----------------call if persists-----------------------

## 2024-02-13 ENCOUNTER — OFFICE VISIT (OUTPATIENT)
Dept: FAMILY MEDICINE | Facility: CLINIC | Age: 69
End: 2024-02-13
Payer: COMMERCIAL

## 2024-02-13 ENCOUNTER — LAB VISIT (OUTPATIENT)
Dept: LAB | Facility: HOSPITAL | Age: 69
End: 2024-02-13
Attending: INTERNAL MEDICINE
Payer: COMMERCIAL

## 2024-02-13 VITALS
RESPIRATION RATE: 18 BRPM | HEART RATE: 92 BPM | TEMPERATURE: 96 F | WEIGHT: 134.25 LBS | BODY MASS INDEX: 19.22 KG/M2 | HEIGHT: 70 IN | OXYGEN SATURATION: 95 %

## 2024-02-13 DIAGNOSIS — G30.9 ALZHEIMER'S DEMENTIA WITHOUT BEHAVIORAL DISTURBANCE, PSYCHOTIC DISTURBANCE, MOOD DISTURBANCE, OR ANXIETY, UNSPECIFIED DEMENTIA SEVERITY, UNSPECIFIED TIMING OF DEMENTIA ONSET: ICD-10-CM

## 2024-02-13 DIAGNOSIS — I10 PRIMARY HYPERTENSION: Chronic | ICD-10-CM

## 2024-02-13 DIAGNOSIS — E78.5 DYSLIPIDEMIA: ICD-10-CM

## 2024-02-13 DIAGNOSIS — F02.80 ALZHEIMER'S DEMENTIA WITHOUT BEHAVIORAL DISTURBANCE, PSYCHOTIC DISTURBANCE, MOOD DISTURBANCE, OR ANXIETY, UNSPECIFIED DEMENTIA SEVERITY, UNSPECIFIED TIMING OF DEMENTIA ONSET: ICD-10-CM

## 2024-02-13 DIAGNOSIS — G20.A1 PARKINSON'S DISEASE, UNSPECIFIED WHETHER DYSKINESIA PRESENT, UNSPECIFIED WHETHER MANIFESTATIONS FLUCTUATE: ICD-10-CM

## 2024-02-13 DIAGNOSIS — I10 PRIMARY HYPERTENSION: Primary | Chronic | ICD-10-CM

## 2024-02-13 LAB
ANION GAP SERPL CALC-SCNC: 11 MMOL/L (ref 8–16)
BASOPHILS # BLD AUTO: 0.03 K/UL (ref 0–0.2)
BASOPHILS NFR BLD: 0.4 % (ref 0–1.9)
BUN SERPL-MCNC: 23 MG/DL (ref 8–23)
CALCIUM SERPL-MCNC: 10 MG/DL (ref 8.7–10.5)
CHLORIDE SERPL-SCNC: 102 MMOL/L (ref 95–110)
CO2 SERPL-SCNC: 29 MMOL/L (ref 23–29)
CREAT SERPL-MCNC: 1.6 MG/DL (ref 0.5–1.4)
DIFFERENTIAL METHOD BLD: ABNORMAL
EOSINOPHIL # BLD AUTO: 0.1 K/UL (ref 0–0.5)
EOSINOPHIL NFR BLD: 1.7 % (ref 0–8)
ERYTHROCYTE [DISTWIDTH] IN BLOOD BY AUTOMATED COUNT: 12.1 % (ref 11.5–14.5)
EST. GFR  (NO RACE VARIABLE): 46.1 ML/MIN/1.73 M^2
GLUCOSE SERPL-MCNC: 84 MG/DL (ref 70–110)
HCT VFR BLD AUTO: 36.7 % (ref 40–54)
HGB BLD-MCNC: 12.6 G/DL (ref 14–18)
IMM GRANULOCYTES # BLD AUTO: 0.01 K/UL (ref 0–0.04)
IMM GRANULOCYTES NFR BLD AUTO: 0.1 % (ref 0–0.5)
LYMPHOCYTES # BLD AUTO: 1.6 K/UL (ref 1–4.8)
LYMPHOCYTES NFR BLD: 22.1 % (ref 18–48)
MCH RBC QN AUTO: 31.3 PG (ref 27–31)
MCHC RBC AUTO-ENTMCNC: 34.3 G/DL (ref 32–36)
MCV RBC AUTO: 91 FL (ref 82–98)
MONOCYTES # BLD AUTO: 0.5 K/UL (ref 0.3–1)
MONOCYTES NFR BLD: 7.6 % (ref 4–15)
NEUTROPHILS # BLD AUTO: 4.9 K/UL (ref 1.8–7.7)
NEUTROPHILS NFR BLD: 68.1 % (ref 38–73)
NRBC BLD-RTO: 0 /100 WBC
PLATELET # BLD AUTO: 262 K/UL (ref 150–450)
PMV BLD AUTO: 10.3 FL (ref 9.2–12.9)
POTASSIUM SERPL-SCNC: 3.3 MMOL/L (ref 3.5–5.1)
RBC # BLD AUTO: 4.02 M/UL (ref 4.6–6.2)
SODIUM SERPL-SCNC: 142 MMOL/L (ref 136–145)
WBC # BLD AUTO: 7.12 K/UL (ref 3.9–12.7)

## 2024-02-13 PROCEDURE — 80048 BASIC METABOLIC PNL TOTAL CA: CPT | Performed by: INTERNAL MEDICINE

## 2024-02-13 PROCEDURE — 1159F MED LIST DOCD IN RCRD: CPT | Mod: CPTII,S$GLB,, | Performed by: INTERNAL MEDICINE

## 2024-02-13 PROCEDURE — 1126F AMNT PAIN NOTED NONE PRSNT: CPT | Mod: CPTII,S$GLB,, | Performed by: INTERNAL MEDICINE

## 2024-02-13 PROCEDURE — 3288F FALL RISK ASSESSMENT DOCD: CPT | Mod: CPTII,S$GLB,, | Performed by: INTERNAL MEDICINE

## 2024-02-13 PROCEDURE — 3008F BODY MASS INDEX DOCD: CPT | Mod: CPTII,S$GLB,, | Performed by: INTERNAL MEDICINE

## 2024-02-13 PROCEDURE — 99397 PER PM REEVAL EST PAT 65+ YR: CPT | Mod: S$GLB,,, | Performed by: INTERNAL MEDICINE

## 2024-02-13 PROCEDURE — 85025 COMPLETE CBC W/AUTO DIFF WBC: CPT | Performed by: INTERNAL MEDICINE

## 2024-02-13 PROCEDURE — 1101F PT FALLS ASSESS-DOCD LE1/YR: CPT | Mod: CPTII,S$GLB,, | Performed by: INTERNAL MEDICINE

## 2024-02-13 PROCEDURE — 99999 PR PBB SHADOW E&M-EST. PATIENT-LVL IV: CPT | Mod: PBBFAC,,, | Performed by: INTERNAL MEDICINE

## 2024-02-13 PROCEDURE — 36415 COLL VENOUS BLD VENIPUNCTURE: CPT | Mod: PO | Performed by: INTERNAL MEDICINE

## 2024-02-13 PROCEDURE — 4010F ACE/ARB THERAPY RXD/TAKEN: CPT | Mod: CPTII,S$GLB,, | Performed by: INTERNAL MEDICINE

## 2024-02-13 NOTE — PROGRESS NOTES
Subjective:       Patient ID: Jose Long is a 68 y.o. male.    Chief Complaint: Follow-up, Hypertension, Hyperlipidemia, and Dementia    Follow-up  Associated symptoms include arthralgias. Pertinent negatives include no abdominal pain, chest pain, chills, coughing, diaphoresis, fatigue, fever, headaches, joint swelling, myalgias, nausea, neck pain, numbness, rash, sore throat, vomiting or weakness.   Hypertension  Pertinent negatives include no chest pain, headaches, neck pain, palpitations or shortness of breath.   Hyperlipidemia  Pertinent negatives include no chest pain, myalgias or shortness of breath.     Past Medical History:   Diagnosis Date    Dyslipidemia     Hypertension     Multiple allergies      Past Surgical History:   Procedure Laterality Date    COLONOSCOPY N/A 6/29/2018    Procedure: COLONOSCOPY;  Surgeon: Kermit Leone MD;  Location: [facility] ENDO;  Service: Endoscopy;  Laterality: N/A;    COLONOSCOPY N/A 5/9/2023    Procedure: COLONOSCOPY;  Surgeon: Jazmin Kraus MD;  Location: [facility] ENDO;  Service: Endoscopy;  Laterality: N/A;     Family History   Problem Relation Age of Onset    Cataracts Mother     Hypertension Mother     Alzheimer's disease Mother     Diabetes Maternal Aunt     Macular degeneration Maternal Aunt     Diabetes Paternal Grandmother     Colon cancer Father     Melanoma Neg Hx      Social History     Socioeconomic History    Marital status: Single   Tobacco Use    Smoking status: Former    Smokeless tobacco: Never   Substance and Sexual Activity    Alcohol use: No    Drug use: No     Social Determinants of Health     Financial Resource Strain: Low Risk  (6/19/2019)    Overall Financial Resource Strain (CARDIA)     Difficulty of Paying Living Expenses: Not hard at all   Food Insecurity: No Food Insecurity (6/19/2019)    Hunger Vital Sign     Worried About Running Out of Food in the Last Year: Never true     Ran Out of Food in the Last Year: Never true   Transportation Needs:  Unknown (6/19/2019)    PRAPARE - Transportation     Lack of Transportation (Medical): No   Physical Activity: Sufficiently Active (6/19/2019)    Exercise Vital Sign     Days of Exercise per Week: 2 days     Minutes of Exercise per Session: 90 min   Stress: No Stress Concern Present (6/19/2019)    [employer] - Occupational Stress Questionnaire     Feeling of Stress : Not at all   Social Connections: Unknown (6/19/2019)    Social Connection and Isolation Panel [NHANES]     Frequency of Communication with Friends and Family: More than three times a week     Frequency of Social Gatherings with Friends and Family: More than three times a week     Active Member of Clubs or Organizations: No     Attends Club or Organization Meetings: Never     Marital Status: Never      Review of Systems   Constitutional:  Negative for activity change, appetite change, chills, diaphoresis, fatigue, fever and unexpected weight change.   HENT:  Negative for drooling, ear discharge, ear pain, facial swelling, hearing loss, mouth sores, nosebleeds, postnasal drip, rhinorrhea, sinus pressure, sneezing, sore throat, tinnitus, trouble swallowing and voice change.    Eyes:  Negative for photophobia, redness and visual disturbance.   Respiratory:  Negative for apnea, cough, choking, chest tightness, shortness of breath and wheezing.    Cardiovascular:  Negative for chest pain and palpitations.   Gastrointestinal:  Negative for abdominal distention, abdominal pain, anal bleeding, blood in stool, constipation, diarrhea, nausea and vomiting.   Endocrine: Negative for cold intolerance, heat intolerance, polydipsia, polyphagia and polyuria.   Genitourinary:  Negative for difficulty urinating, dysuria, enuresis, flank pain, frequency, genital sores, hematuria and urgency.   Musculoskeletal:  Positive for arthralgias. Negative for back pain, gait problem, joint swelling, myalgias, neck pain and neck stiffness.   Skin:   Negative for color change, pallor, rash and wound.   Allergic/Immunologic: Negative for food allergies and immunocompromised state.   Neurological:  Negative for dizziness, tremors, seizures, syncope, facial asymmetry, speech difficulty, weakness, light-headedness, numbness and headaches.   Hematological:  Negative for adenopathy. Does not bruise/bleed easily.   Psychiatric/Behavioral:  Positive for confusion. Negative for agitation, behavioral problems, decreased concentration, dysphoric mood, hallucinations, self-injury, sleep disturbance and suicidal ideas. The patient is not nervous/anxious and is not hyperactive.        Objective:      Physical Exam  Vitals and nursing note reviewed.   Constitutional:       General: He is not in acute distress.     Appearance: Normal appearance. He is well-developed. He is not diaphoretic.   HENT:      Head: Normocephalic and atraumatic.      Mouth/Throat:      Pharynx: No oropharyngeal exudate.   Eyes:      General: No scleral icterus.     Pupils: Pupils are equal, round, and reactive to light.   Neck:      Thyroid: No thyromegaly.      Vascular: No carotid bruit or JVD.      Trachea: No tracheal deviation.   Cardiovascular:      Rate and Rhythm: Normal rate and regular rhythm.      Heart sounds: Normal heart sounds.   Pulmonary:      Effort: Pulmonary effort is normal. No respiratory distress.      Breath sounds: Normal breath sounds. No wheezing or rales.   Chest:      Chest wall: No tenderness.   Abdominal:      General: Bowel sounds are normal. There is no distension.      Palpations: Abdomen is soft.      Tenderness: There is no abdominal tenderness. There is no guarding or rebound.   Musculoskeletal:         General: No tenderness. Normal range of motion.      Cervical back: Normal range of motion and neck supple.   Lymphadenopathy:      Cervical: No cervical adenopathy.   Skin:     General: Skin is warm and dry.      Coloration: Skin is not pale.      Findings: No  erythema or rash.   Neurological:      Mental Status: He is alert and oriented to person, place, and time.         CMP  Sodium   Date Value Ref Range Status   04/21/2023 145 136 - 145 mmol/L Final     Potassium   Date Value Ref Range Status   04/21/2023 3.4 (L) 3.5 - 5.1 mmol/L Final     Chloride   Date Value Ref Range Status   04/21/2023 101 95 - 110 mmol/L Final     CO2   Date Value Ref Range Status   04/21/2023 26 23 - 29 mmol/L Final     Glucose   Date Value Ref Range Status   04/21/2023 67 (L) 70 - 110 mg/dL Final     BUN   Date Value Ref Range Status   04/21/2023 16 8 - 23 mg/dL Final     Creatinine   Date Value Ref Range Status   04/21/2023 1.3 0.5 - 1.4 mg/dL Final     Calcium   Date Value Ref Range Status   04/21/2023 9.6 8.7 - 10.5 mg/dL Final     Total Protein   Date Value Ref Range Status   04/21/2023 7.4 6.0 - 8.4 g/dL Final     Albumin   Date Value Ref Range Status   04/21/2023 4.0 3.5 - 5.2 g/dL Final     Total Bilirubin   Date Value Ref Range Status   04/21/2023 0.7 0.1 - 1.0 mg/dL Final     Comment:     For infants and newborns, interpretation of results should be based  on gestational age, weight and in agreement with clinical  observations.    Premature Infant recommended reference ranges:  Up to 24 hours.............<8.0 mg/dL  Up to 48 hours............<12.0 mg/dL  3-5 days..................<15.0 mg/dL  6-29 days.................<15.0 mg/dL       Alkaline Phosphatase   Date Value Ref Range Status   04/21/2023 69 55 - 135 U/L Final     AST   Date Value Ref Range Status   04/21/2023 23 10 - 40 U/L Final     ALT   Date Value Ref Range Status   04/21/2023 7 (L) 10 - 44 U/L Final     Anion Gap   Date Value Ref Range Status   04/21/2023 18 (H) 8 - 16 mmol/L Final     eGFR if    Date Value Ref Range Status   03/07/2022 >60.0 >60 mL/min/1.73 m^2 Final     eGFR if non    Date Value Ref Range Status   03/07/2022 56.1 (A) >60 mL/min/1.73 m^2 Final     Comment:      Calculation used to obtain the estimated glomerular filtration  rate (eGFR) is the CKD-EPI equation.        Lab Results   Component Value Date    WBC 8.22 10/05/2023    HGB 13.8 (L) 10/05/2023    HCT 40.8 10/05/2023    MCV 90 10/05/2023     10/05/2023     Lab Results   Component Value Date    CHOL 175 04/05/2023     Lab Results   Component Value Date    HDL 59 04/05/2023     Lab Results   Component Value Date    LDLCALC 104.4 04/05/2023     Lab Results   Component Value Date    TRIG 58 04/05/2023     Lab Results   Component Value Date    CHOLHDL 33.7 04/05/2023     Lab Results   Component Value Date    TSH 0.740 10/05/2023     Lab Results   Component Value Date    HGBA1C 5.4 06/08/2021     Assessment:       1. Primary hypertension    2. Dyslipidemia    3. Alzheimer's dementia without behavioral disturbance, psychotic disturbance, mood disturbance, or anxiety, unspecified dementia severity, unspecified timing of dementia onset    4. Parkinson's disease, unspecified whether dyskinesia present, unspecified whether manifestations fluctuate        Plan:   Primary hypertension  -     Basic Metabolic Panel; Future; Expected date: 02/13/2024  -     CBC Auto Differential; Future; Expected date: 02/13/2024    Dyslipidemia    Alzheimer's dementia without behavioral disturbance, psychotic disturbance, mood disturbance, or anxiety, unspecified dementia severity, unspecified timing of dementia onset    Parkinson's disease, unspecified whether dyskinesia present, unspecified whether manifestations fluctuate      Continue meds---------------------as above-------------------------sees neurology-----------------------f/u 6 months------------

## 2024-02-14 ENCOUNTER — TELEPHONE (OUTPATIENT)
Dept: FAMILY MEDICINE | Facility: CLINIC | Age: 69
End: 2024-02-14
Payer: COMMERCIAL

## 2024-02-14 ENCOUNTER — PATIENT MESSAGE (OUTPATIENT)
Dept: FAMILY MEDICINE | Facility: CLINIC | Age: 69
End: 2024-02-14
Payer: COMMERCIAL

## 2024-02-14 DIAGNOSIS — N17.9 AKI (ACUTE KIDNEY INJURY): ICD-10-CM

## 2024-02-14 DIAGNOSIS — D64.9 ANEMIA, UNSPECIFIED TYPE: Primary | ICD-10-CM

## 2024-02-14 DIAGNOSIS — R63.4 WEIGHT LOSS: ICD-10-CM

## 2024-02-14 NOTE — TELEPHONE ENCOUNTER
Repeat bmp,cbc,ferritin,iron,b12,folate on Monday for anemia-----------------get hematology consult for anemia--------------and nephrology consult for renal insuff.         And do ct abdomen/pelvis for weight loss-------------all ordered-------------f/u appt with me in 2 weeks-

## 2024-02-19 ENCOUNTER — LAB VISIT (OUTPATIENT)
Dept: LAB | Facility: HOSPITAL | Age: 69
End: 2024-02-19
Attending: INTERNAL MEDICINE
Payer: COMMERCIAL

## 2024-02-19 ENCOUNTER — TELEPHONE (OUTPATIENT)
Dept: HEMATOLOGY/ONCOLOGY | Facility: CLINIC | Age: 69
End: 2024-02-19
Payer: COMMERCIAL

## 2024-02-19 DIAGNOSIS — N17.9 AKI (ACUTE KIDNEY INJURY): ICD-10-CM

## 2024-02-19 DIAGNOSIS — D64.9 ANEMIA, UNSPECIFIED TYPE: ICD-10-CM

## 2024-02-19 LAB
ANION GAP SERPL CALC-SCNC: 9 MMOL/L (ref 8–16)
BASOPHILS # BLD AUTO: 0.04 K/UL (ref 0–0.2)
BASOPHILS NFR BLD: 0.6 % (ref 0–1.9)
BUN SERPL-MCNC: 22 MG/DL (ref 8–23)
CALCIUM SERPL-MCNC: 9.8 MG/DL (ref 8.7–10.5)
CHLORIDE SERPL-SCNC: 104 MMOL/L (ref 95–110)
CO2 SERPL-SCNC: 30 MMOL/L (ref 23–29)
CREAT SERPL-MCNC: 1.4 MG/DL (ref 0.5–1.4)
DIFFERENTIAL METHOD BLD: ABNORMAL
EOSINOPHIL # BLD AUTO: 0.1 K/UL (ref 0–0.5)
EOSINOPHIL NFR BLD: 1.8 % (ref 0–8)
ERYTHROCYTE [DISTWIDTH] IN BLOOD BY AUTOMATED COUNT: 12 % (ref 11.5–14.5)
EST. GFR  (NO RACE VARIABLE): 54.1 ML/MIN/1.73 M^2
GLUCOSE SERPL-MCNC: 113 MG/DL (ref 70–110)
HCT VFR BLD AUTO: 37.1 % (ref 40–54)
HGB BLD-MCNC: 12.5 G/DL (ref 14–18)
IMM GRANULOCYTES # BLD AUTO: 0.02 K/UL (ref 0–0.04)
IMM GRANULOCYTES NFR BLD AUTO: 0.3 % (ref 0–0.5)
IRON SERPL-MCNC: 80 UG/DL (ref 45–160)
LYMPHOCYTES # BLD AUTO: 1.4 K/UL (ref 1–4.8)
LYMPHOCYTES NFR BLD: 21 % (ref 18–48)
MCH RBC QN AUTO: 31 PG (ref 27–31)
MCHC RBC AUTO-ENTMCNC: 33.7 G/DL (ref 32–36)
MCV RBC AUTO: 92 FL (ref 82–98)
MONOCYTES # BLD AUTO: 0.4 K/UL (ref 0.3–1)
MONOCYTES NFR BLD: 5.9 % (ref 4–15)
NEUTROPHILS # BLD AUTO: 4.8 K/UL (ref 1.8–7.7)
NEUTROPHILS NFR BLD: 70.4 % (ref 38–73)
NRBC BLD-RTO: 0 /100 WBC
PLATELET # BLD AUTO: 272 K/UL (ref 150–450)
PMV BLD AUTO: 10.8 FL (ref 9.2–12.9)
POTASSIUM SERPL-SCNC: 3.6 MMOL/L (ref 3.5–5.1)
RBC # BLD AUTO: 4.03 M/UL (ref 4.6–6.2)
SODIUM SERPL-SCNC: 143 MMOL/L (ref 136–145)
WBC # BLD AUTO: 6.82 K/UL (ref 3.9–12.7)

## 2024-02-19 PROCEDURE — 85025 COMPLETE CBC W/AUTO DIFF WBC: CPT | Performed by: INTERNAL MEDICINE

## 2024-02-19 PROCEDURE — 83540 ASSAY OF IRON: CPT | Performed by: INTERNAL MEDICINE

## 2024-02-19 PROCEDURE — 82746 ASSAY OF FOLIC ACID SERUM: CPT | Performed by: INTERNAL MEDICINE

## 2024-02-19 PROCEDURE — 82607 VITAMIN B-12: CPT | Performed by: INTERNAL MEDICINE

## 2024-02-19 PROCEDURE — 82728 ASSAY OF FERRITIN: CPT | Performed by: INTERNAL MEDICINE

## 2024-02-19 PROCEDURE — 36415 COLL VENOUS BLD VENIPUNCTURE: CPT | Mod: PO | Performed by: INTERNAL MEDICINE

## 2024-02-19 PROCEDURE — 80048 BASIC METABOLIC PNL TOTAL CA: CPT | Performed by: INTERNAL MEDICINE

## 2024-02-19 NOTE — TELEPHONE ENCOUNTER
----- Message from Gini Mcneal MA sent at 2/19/2024  4:22 PM CST -----  Contact: patient    ----- Message -----  From: Tristen Treviño  Sent: 2/19/2024   2:39 PM CST  To: [facility] Hematology/Oncology Scheduling Pool    Type:  Patient Returning Call    Who Called:Jose Long   Who Left Message for Patient: Meera   Does the patient know what this is regarding?: yes   Would the patient rather a call back or a response via [facility]?  Call back   Best Call Back Number: [phone]   Additional Information:

## 2024-02-19 NOTE — TELEPHONE ENCOUNTER
Nurse spoke with pt in regards to rescheduling his appt. Pt has been scheduled,verbalized understanding.

## 2024-02-20 ENCOUNTER — TELEPHONE (OUTPATIENT)
Dept: FAMILY MEDICINE | Facility: CLINIC | Age: 69
End: 2024-02-20
Payer: COMMERCIAL

## 2024-02-20 ENCOUNTER — TELEPHONE (OUTPATIENT)
Dept: NEPHROLOGY | Facility: CLINIC | Age: 69
End: 2024-02-20
Payer: COMMERCIAL

## 2024-02-20 ENCOUNTER — PATIENT MESSAGE (OUTPATIENT)
Dept: FAMILY MEDICINE | Facility: CLINIC | Age: 69
End: 2024-02-20
Payer: COMMERCIAL

## 2024-02-20 LAB
FERRITIN SERPL-MCNC: 197 NG/ML (ref 20–300)
FOLATE SERPL-MCNC: 15.6 NG/ML (ref 4–24)
VIT B12 SERPL-MCNC: 340 PG/ML (ref 210–950)

## 2024-02-20 NOTE — TELEPHONE ENCOUNTER
----- Message from Tarsha Vega sent at 2/20/2024  9:31 AM CST -----  Regarding: New Patient Appointment  Good Morning, patient scheduled to see Dr. Jonathan Lee in [place] on 03/28/24 for DEBRA (acute kidney injury). states the appointment location was made in error, cannot go to [place], only want to be seen in [place]. Please schedule with someone in [place]. Please call [phone]. Thanks/elr

## 2024-02-20 NOTE — TELEPHONE ENCOUNTER
Patient was encouraged to keep his appointment with Dr. Lee for now. He will check with PCP next week and reschedule for [place] nephrology if needed.

## 2024-02-27 ENCOUNTER — LAB VISIT (OUTPATIENT)
Dept: LAB | Facility: HOSPITAL | Age: 69
End: 2024-02-27
Attending: INTERNAL MEDICINE
Payer: COMMERCIAL

## 2024-02-27 ENCOUNTER — OFFICE VISIT (OUTPATIENT)
Dept: FAMILY MEDICINE | Facility: CLINIC | Age: 69
End: 2024-02-27
Payer: COMMERCIAL

## 2024-02-27 VITALS
HEART RATE: 68 BPM | DIASTOLIC BLOOD PRESSURE: 64 MMHG | HEIGHT: 70 IN | SYSTOLIC BLOOD PRESSURE: 118 MMHG | BODY MASS INDEX: 19.94 KG/M2 | TEMPERATURE: 98 F | OXYGEN SATURATION: 97 % | WEIGHT: 139.31 LBS | RESPIRATION RATE: 18 BRPM

## 2024-02-27 DIAGNOSIS — N17.9 AKI (ACUTE KIDNEY INJURY): ICD-10-CM

## 2024-02-27 DIAGNOSIS — D64.9 ANEMIA, UNSPECIFIED TYPE: ICD-10-CM

## 2024-02-27 DIAGNOSIS — D64.9 ANEMIA, UNSPECIFIED TYPE: Primary | ICD-10-CM

## 2024-02-27 LAB
ANION GAP SERPL CALC-SCNC: 13 MMOL/L (ref 8–16)
BASOPHILS # BLD AUTO: 0.05 K/UL (ref 0–0.2)
BASOPHILS NFR BLD: 0.6 % (ref 0–1.9)
BUN SERPL-MCNC: 22 MG/DL (ref 8–23)
CALCIUM SERPL-MCNC: 9.3 MG/DL (ref 8.7–10.5)
CHLORIDE SERPL-SCNC: 104 MMOL/L (ref 95–110)
CO2 SERPL-SCNC: 28 MMOL/L (ref 23–29)
CREAT SERPL-MCNC: 1.6 MG/DL (ref 0.5–1.4)
DIFFERENTIAL METHOD BLD: ABNORMAL
EOSINOPHIL # BLD AUTO: 0.2 K/UL (ref 0–0.5)
EOSINOPHIL NFR BLD: 2.4 % (ref 0–8)
ERYTHROCYTE [DISTWIDTH] IN BLOOD BY AUTOMATED COUNT: 12.2 % (ref 11.5–14.5)
EST. GFR  (NO RACE VARIABLE): 46.1 ML/MIN/1.73 M^2
GLUCOSE SERPL-MCNC: 79 MG/DL (ref 70–110)
HCT VFR BLD AUTO: 36.1 % (ref 40–54)
HGB BLD-MCNC: 12.1 G/DL (ref 14–18)
IMM GRANULOCYTES # BLD AUTO: 0.02 K/UL (ref 0–0.04)
IMM GRANULOCYTES NFR BLD AUTO: 0.3 % (ref 0–0.5)
LYMPHOCYTES # BLD AUTO: 1.8 K/UL (ref 1–4.8)
LYMPHOCYTES NFR BLD: 22.9 % (ref 18–48)
MCH RBC QN AUTO: 31.6 PG (ref 27–31)
MCHC RBC AUTO-ENTMCNC: 33.5 G/DL (ref 32–36)
MCV RBC AUTO: 94 FL (ref 82–98)
MONOCYTES # BLD AUTO: 0.6 K/UL (ref 0.3–1)
MONOCYTES NFR BLD: 7.1 % (ref 4–15)
NEUTROPHILS # BLD AUTO: 5.3 K/UL (ref 1.8–7.7)
NEUTROPHILS NFR BLD: 66.7 % (ref 38–73)
NRBC BLD-RTO: 0 /100 WBC
PLATELET # BLD AUTO: 265 K/UL (ref 150–450)
PMV BLD AUTO: 10.9 FL (ref 9.2–12.9)
POTASSIUM SERPL-SCNC: 3.8 MMOL/L (ref 3.5–5.1)
RBC # BLD AUTO: 3.83 M/UL (ref 4.6–6.2)
SODIUM SERPL-SCNC: 145 MMOL/L (ref 136–145)
WBC # BLD AUTO: 7.92 K/UL (ref 3.9–12.7)

## 2024-02-27 PROCEDURE — 99999 PR PBB SHADOW E&M-EST. PATIENT-LVL IV: CPT | Mod: PBBFAC,,, | Performed by: INTERNAL MEDICINE

## 2024-02-27 PROCEDURE — 4010F ACE/ARB THERAPY RXD/TAKEN: CPT | Mod: CPTII,S$GLB,, | Performed by: INTERNAL MEDICINE

## 2024-02-27 PROCEDURE — 3074F SYST BP LT 130 MM HG: CPT | Mod: CPTII,S$GLB,, | Performed by: INTERNAL MEDICINE

## 2024-02-27 PROCEDURE — 1159F MED LIST DOCD IN RCRD: CPT | Mod: CPTII,S$GLB,, | Performed by: INTERNAL MEDICINE

## 2024-02-27 PROCEDURE — 3008F BODY MASS INDEX DOCD: CPT | Mod: CPTII,S$GLB,, | Performed by: INTERNAL MEDICINE

## 2024-02-27 PROCEDURE — 80048 BASIC METABOLIC PNL TOTAL CA: CPT | Performed by: INTERNAL MEDICINE

## 2024-02-27 PROCEDURE — 36415 COLL VENOUS BLD VENIPUNCTURE: CPT | Mod: PO | Performed by: INTERNAL MEDICINE

## 2024-02-27 PROCEDURE — 85025 COMPLETE CBC W/AUTO DIFF WBC: CPT | Performed by: INTERNAL MEDICINE

## 2024-02-27 PROCEDURE — 99213 OFFICE O/P EST LOW 20 MIN: CPT | Mod: S$GLB,,, | Performed by: INTERNAL MEDICINE

## 2024-02-27 PROCEDURE — 3078F DIAST BP <80 MM HG: CPT | Mod: CPTII,S$GLB,, | Performed by: INTERNAL MEDICINE

## 2024-02-27 PROCEDURE — 1126F AMNT PAIN NOTED NONE PRSNT: CPT | Mod: CPTII,S$GLB,, | Performed by: INTERNAL MEDICINE

## 2024-02-27 PROCEDURE — 3288F FALL RISK ASSESSMENT DOCD: CPT | Mod: CPTII,S$GLB,, | Performed by: INTERNAL MEDICINE

## 2024-02-27 PROCEDURE — 1101F PT FALLS ASSESS-DOCD LE1/YR: CPT | Mod: CPTII,S$GLB,, | Performed by: INTERNAL MEDICINE

## 2024-02-27 NOTE — PROGRESS NOTES
Subjective:       Patient ID: Jose Long is a 68 y.o. male.    Chief Complaint: Follow-up, Hypertension, Anemia, and jeannine    F/u ------pt has gained 5 #------------no new symptoms today--------------    Follow-up  Associated symptoms include arthralgias. Pertinent negatives include no abdominal pain, chest pain, chills, coughing, diaphoresis, fatigue, fever, headaches, joint swelling, myalgias, nausea, neck pain, numbness, rash, sore throat, vomiting or weakness.   Hypertension  Pertinent negatives include no chest pain, headaches, neck pain, palpitations or shortness of breath.   Anemia  There has been no abdominal pain, bruising/bleeding easily, fever, light-headedness, pallor or palpitations.     Past Medical History:   Diagnosis Date    Dyslipidemia     Hypertension     Multiple allergies      Past Surgical History:   Procedure Laterality Date    COLONOSCOPY N/A 6/29/2018    Procedure: COLONOSCOPY;  Surgeon: Kermit Leone MD;  Location: [facility] ENDO;  Service: Endoscopy;  Laterality: N/A;    COLONOSCOPY N/A 5/9/2023    Procedure: COLONOSCOPY;  Surgeon: Jazmin Kraus MD;  Location: [facility] ENDO;  Service: Endoscopy;  Laterality: N/A;     Family History   Problem Relation Age of Onset    Cataracts Mother     Hypertension Mother     Alzheimer's disease Mother     Diabetes Maternal Aunt     Macular degeneration Maternal Aunt     Diabetes Paternal Grandmother     Colon cancer Father     Melanoma Neg Hx      Social History     Socioeconomic History    Marital status: Single   Tobacco Use    Smoking status: Former    Smokeless tobacco: Never   Substance and Sexual Activity    Alcohol use: No    Drug use: No     Social Determinants of Health     Financial Resource Strain: Low Risk  (6/19/2019)    Overall Financial Resource Strain (CARDIA)     Difficulty of Paying Living Expenses: Not hard at all   Food Insecurity: No Food Insecurity (6/19/2019)    Hunger Vital Sign     Worried About Running Out of Food in the Last  Year: Never true     Ran Out of Food in the Last Year: Never true   Transportation Needs: Unknown (6/19/2019)    PRAPARE - Transportation     Lack of Transportation (Medical): No   Physical Activity: Sufficiently Active (6/19/2019)    Exercise Vital Sign     Days of Exercise per Week: 2 days     Minutes of Exercise per Session: 90 min   Stress: No Stress Concern Present (6/19/2019)    [employer] - Occupational Stress Questionnaire     Feeling of Stress : Not at all   Social Connections: Unknown (6/19/2019)    Social Connection and Isolation Panel [NHANES]     Frequency of Communication with Friends and Family: More than three times a week     Frequency of Social Gatherings with Friends and Family: More than three times a week     Active Member of Clubs or Organizations: No     Attends Club or Organization Meetings: Never     Marital Status: Never      Review of Systems   Constitutional:  Negative for activity change, appetite change, chills, diaphoresis, fatigue, fever and unexpected weight change.   HENT:  Negative for drooling, ear discharge, ear pain, facial swelling, hearing loss, mouth sores, nosebleeds, postnasal drip, rhinorrhea, sinus pressure, sneezing, sore throat, tinnitus, trouble swallowing and voice change.    Eyes:  Negative for photophobia, redness and visual disturbance.   Respiratory:  Negative for apnea, cough, choking, chest tightness, shortness of breath and wheezing.    Cardiovascular:  Negative for chest pain, palpitations and leg swelling.   Gastrointestinal:  Negative for abdominal distention, abdominal pain, anal bleeding, blood in stool, constipation, diarrhea, nausea and vomiting.   Endocrine: Negative for cold intolerance, heat intolerance, polydipsia, polyphagia and polyuria.   Genitourinary:  Negative for difficulty urinating, dysuria, enuresis, flank pain, frequency, genital sores, hematuria and urgency.   Musculoskeletal:  Positive for  arthralgias. Negative for back pain, gait problem, joint swelling, myalgias, neck pain and neck stiffness.   Skin:  Negative for color change, pallor, rash and wound.   Allergic/Immunologic: Negative for food allergies and immunocompromised state.   Neurological:  Negative for dizziness, tremors, seizures, syncope, facial asymmetry, speech difficulty, weakness, light-headedness, numbness and headaches.   Hematological:  Negative for adenopathy. Does not bruise/bleed easily.   Psychiatric/Behavioral:  Negative for agitation, behavioral problems, decreased concentration, dysphoric mood, hallucinations, self-injury, sleep disturbance and suicidal ideas. The patient is not nervous/anxious and is not hyperactive.        Objective:      Physical Exam  Vitals and nursing note reviewed.   Constitutional:       General: He is not in acute distress.     Appearance: Normal appearance. He is well-developed. He is not diaphoretic.   HENT:      Head: Normocephalic and atraumatic.      Mouth/Throat:      Pharynx: No oropharyngeal exudate.   Eyes:      General: No scleral icterus.     Pupils: Pupils are equal, round, and reactive to light.   Neck:      Thyroid: No thyromegaly.      Vascular: No carotid bruit or JVD.      Trachea: No tracheal deviation.   Cardiovascular:      Rate and Rhythm: Normal rate and regular rhythm.      Heart sounds: Normal heart sounds.   Pulmonary:      Effort: Pulmonary effort is normal. No respiratory distress.      Breath sounds: Normal breath sounds. No wheezing or rales.   Chest:      Chest wall: No tenderness.   Abdominal:      General: Bowel sounds are normal. There is no distension.      Palpations: Abdomen is soft.      Tenderness: There is no abdominal tenderness. There is no guarding or rebound.   Musculoskeletal:         General: No tenderness. Normal range of motion.      Cervical back: Normal range of motion and neck supple.   Lymphadenopathy:      Cervical: No cervical adenopathy.   Skin:      General: Skin is warm and dry.      Coloration: Skin is not pale.      Findings: No erythema or rash.   Neurological:      Mental Status: He is alert and oriented to person, place, and time.   Psychiatric:         Behavior: Behavior normal.         Thought Content: Thought content normal.         Judgment: Judgment normal.         CMP  Sodium   Date Value Ref Range Status   02/19/2024 143 136 - 145 mmol/L Final     Potassium   Date Value Ref Range Status   02/19/2024 3.6 3.5 - 5.1 mmol/L Final     Chloride   Date Value Ref Range Status   02/19/2024 104 95 - 110 mmol/L Final     CO2   Date Value Ref Range Status   02/19/2024 30 (H) 23 - 29 mmol/L Final     Glucose   Date Value Ref Range Status   02/19/2024 113 (H) 70 - 110 mg/dL Final     BUN   Date Value Ref Range Status   02/19/2024 22 8 - 23 mg/dL Final     Creatinine   Date Value Ref Range Status   02/19/2024 1.4 0.5 - 1.4 mg/dL Final     Calcium   Date Value Ref Range Status   02/19/2024 9.8 8.7 - 10.5 mg/dL Final     Total Protein   Date Value Ref Range Status   04/21/2023 7.4 6.0 - 8.4 g/dL Final     Albumin   Date Value Ref Range Status   04/21/2023 4.0 3.5 - 5.2 g/dL Final     Total Bilirubin   Date Value Ref Range Status   04/21/2023 0.7 0.1 - 1.0 mg/dL Final     Comment:     For infants and newborns, interpretation of results should be based  on gestational age, weight and in agreement with clinical  observations.    Premature Infant recommended reference ranges:  Up to 24 hours.............<8.0 mg/dL  Up to 48 hours............<12.0 mg/dL  3-5 days..................<15.0 mg/dL  6-29 days.................<15.0 mg/dL       Alkaline Phosphatase   Date Value Ref Range Status   04/21/2023 69 55 - 135 U/L Final     AST   Date Value Ref Range Status   04/21/2023 23 10 - 40 U/L Final     ALT   Date Value Ref Range Status   04/21/2023 7 (L) 10 - 44 U/L Final     Anion Gap   Date Value Ref Range Status   02/19/2024 9 8 - 16 mmol/L Final     eGFR if African  American   Date Value Ref Range Status   03/07/2022 >60.0 >60 mL/min/1.73 m^2 Final     eGFR if non    Date Value Ref Range Status   03/07/2022 56.1 (A) >60 mL/min/1.73 m^2 Final     Comment:     Calculation used to obtain the estimated glomerular filtration  rate (eGFR) is the CKD-EPI equation.        Lab Results   Component Value Date    WBC 6.82 02/19/2024    HGB 12.5 (L) 02/19/2024    HCT 37.1 (L) 02/19/2024    MCV 92 02/19/2024     02/19/2024     Lab Results   Component Value Date    CHOL 175 04/05/2023     Lab Results   Component Value Date    HDL 59 04/05/2023     Lab Results   Component Value Date    LDLCALC 104.4 04/05/2023     Lab Results   Component Value Date    TRIG 58 04/05/2023     Lab Results   Component Value Date    CHOLHDL 33.7 04/05/2023     Lab Results   Component Value Date    TSH 0.740 10/05/2023     Lab Results   Component Value Date    HGBA1C 5.4 06/08/2021     Assessment:       1. Anemia, unspecified type    2. DEBRA (acute kidney injury)        Plan:   Anemia, unspecified type---------------stable---------------neg colon 2023------------iron normal-------------f/u hematology consult--  -     CBC Auto Differential; Future; Expected date: 02/27/2024    DEBRA (acute kidney injury)----------?back to baseline-----------keep hydrated---------ct abdomen----------follow----------  -     Basic Metabolic Panel; Future; Expected date: 02/27/2024      As above-----------f/u as scheduled-----------

## 2024-02-28 ENCOUNTER — PATIENT MESSAGE (OUTPATIENT)
Dept: FAMILY MEDICINE | Facility: CLINIC | Age: 69
End: 2024-02-28
Payer: COMMERCIAL

## 2024-02-28 ENCOUNTER — TELEPHONE (OUTPATIENT)
Dept: FAMILY MEDICINE | Facility: CLINIC | Age: 69
End: 2024-02-28
Payer: COMMERCIAL

## 2024-02-28 DIAGNOSIS — N28.9 RENAL INSUFFICIENCY: Primary | ICD-10-CM

## 2024-02-28 NOTE — TELEPHONE ENCOUNTER
Get nephrology consult for renal insuff and repeat bmp, cbc in 2 weeks-----------ordered-  
[employer] message sent.   
(3) adequate

## 2024-02-29 ENCOUNTER — HOSPITAL ENCOUNTER (OUTPATIENT)
Dept: RADIOLOGY | Facility: HOSPITAL | Age: 69
Discharge: HOME | End: 2024-02-29
Attending: INTERNAL MEDICINE
Payer: COMMERCIAL

## 2024-02-29 DIAGNOSIS — R63.4 WEIGHT LOSS: ICD-10-CM

## 2024-02-29 PROCEDURE — A9698 NON-RAD CONTRAST MATERIALNOC: HCPCS | Performed by: INTERNAL MEDICINE

## 2024-02-29 PROCEDURE — 74176 CT ABD & PELVIS W/O CONTRAST: CPT | Mod: TC

## 2024-02-29 PROCEDURE — 25500020 PHARM REV CODE 255: Performed by: INTERNAL MEDICINE

## 2024-02-29 PROCEDURE — 74176 CT ABD & PELVIS W/O CONTRAST: CPT | Mod: 26,,, | Performed by: RADIOLOGY

## 2024-02-29 RX ORDER — LACTULOSE 10 G/15ML
10 SOLUTION ORAL 3 TIMES DAILY PRN
Qty: 60 ML | Refills: 2 | Status: SHIPPED | OUTPATIENT
Start: 2024-02-29 | End: 2024-03-11 | Stop reason: SDUPTHER

## 2024-02-29 RX ADMIN — IOHEXOL 1000 ML: 12 SOLUTION ORAL at 01:02

## 2024-03-01 ENCOUNTER — TELEPHONE (OUTPATIENT)
Dept: FAMILY MEDICINE | Facility: CLINIC | Age: 69
End: 2024-03-01
Payer: COMMERCIAL

## 2024-03-01 ENCOUNTER — PATIENT MESSAGE (OUTPATIENT)
Dept: FAMILY MEDICINE | Facility: CLINIC | Age: 69
End: 2024-03-01
Payer: COMMERCIAL

## 2024-03-01 DIAGNOSIS — K80.20 GALLSTONES: Primary | ICD-10-CM

## 2024-03-01 NOTE — TELEPHONE ENCOUNTER
Surgery consult for gallstones.         Spoke to amanuel and zaira berry-----he has chronic constipation-----no new symptoms --no abdominal or rectal pain.    Will try lactulose for his constipation-----noted on ct scan-------and both instructed to go to er for any abdominal / GI complaints--------------

## 2024-03-01 NOTE — TELEPHONE ENCOUNTER
[employer] message sent per patient preference. Will consult with pt. And schedule appointment as directed.

## 2024-03-06 ENCOUNTER — OFFICE VISIT (OUTPATIENT)
Dept: NEPHROLOGY | Facility: CLINIC | Age: 69
End: 2024-03-06
Payer: COMMERCIAL

## 2024-03-06 ENCOUNTER — LAB VISIT (OUTPATIENT)
Dept: LAB | Facility: HOSPITAL | Age: 69
End: 2024-03-06
Attending: INTERNAL MEDICINE
Payer: COMMERCIAL

## 2024-03-06 ENCOUNTER — OFFICE VISIT (OUTPATIENT)
Dept: SURGERY | Facility: CLINIC | Age: 69
End: 2024-03-06
Payer: COMMERCIAL

## 2024-03-06 VITALS
WEIGHT: 140.63 LBS | HEART RATE: 63 BPM | HEIGHT: 70 IN | DIASTOLIC BLOOD PRESSURE: 61 MMHG | SYSTOLIC BLOOD PRESSURE: 144 MMHG | TEMPERATURE: 98 F | BODY MASS INDEX: 20.13 KG/M2

## 2024-03-06 VITALS
BODY MASS INDEX: 20.04 KG/M2 | WEIGHT: 140 LBS | DIASTOLIC BLOOD PRESSURE: 64 MMHG | SYSTOLIC BLOOD PRESSURE: 140 MMHG | HEART RATE: 71 BPM | HEIGHT: 70 IN | RESPIRATION RATE: 18 BRPM

## 2024-03-06 DIAGNOSIS — N28.9 RENAL INSUFFICIENCY: ICD-10-CM

## 2024-03-06 DIAGNOSIS — N18.31 CHRONIC KIDNEY DISEASE, STAGE 3A: Primary | ICD-10-CM

## 2024-03-06 DIAGNOSIS — N17.9 AKI (ACUTE KIDNEY INJURY): ICD-10-CM

## 2024-03-06 DIAGNOSIS — K80.20 GALLSTONES: ICD-10-CM

## 2024-03-06 LAB
BACTERIA #/AREA URNS AUTO: ABNORMAL /HPF
BILIRUB UR QL STRIP: NEGATIVE
CAOX CRY UR QL COMP ASSIST: ABNORMAL
CLARITY UR REFRACT.AUTO: ABNORMAL
COLOR UR AUTO: YELLOW
CREAT UR-MCNC: 316 MG/DL (ref 23–375)
GLUCOSE UR QL STRIP: ABNORMAL
HGB UR QL STRIP: NEGATIVE
HYALINE CASTS UR QL AUTO: 12 /LPF
KETONES UR QL STRIP: ABNORMAL
LEUKOCYTE ESTERASE UR QL STRIP: ABNORMAL
MICROSCOPIC COMMENT: ABNORMAL
NITRITE UR QL STRIP: NEGATIVE
PH UR STRIP: 6 [PH] (ref 5–8)
PROT UR QL STRIP: ABNORMAL
RBC #/AREA URNS AUTO: 6 /HPF (ref 0–4)
SODIUM UR-SCNC: 52 MMOL/L (ref 20–250)
SP GR UR STRIP: 1.02 (ref 1–1.03)
URN SPEC COLLECT METH UR: ABNORMAL
WBC #/AREA URNS AUTO: 15 /HPF (ref 0–5)

## 2024-03-06 PROCEDURE — 4010F ACE/ARB THERAPY RXD/TAKEN: CPT | Mod: CPTII,S$GLB,, | Performed by: SURGERY

## 2024-03-06 PROCEDURE — 3078F DIAST BP <80 MM HG: CPT | Mod: CPTII,S$GLB,, | Performed by: SURGERY

## 2024-03-06 PROCEDURE — 99205 OFFICE O/P NEW HI 60 MIN: CPT | Mod: S$GLB,,, | Performed by: INTERNAL MEDICINE

## 2024-03-06 PROCEDURE — 99999 PR PBB SHADOW E&M-EST. PATIENT-LVL III: CPT | Mod: PBBFAC,,, | Performed by: SURGERY

## 2024-03-06 PROCEDURE — 3288F FALL RISK ASSESSMENT DOCD: CPT | Mod: CPTII,S$GLB,, | Performed by: INTERNAL MEDICINE

## 2024-03-06 PROCEDURE — 84300 ASSAY OF URINE SODIUM: CPT | Performed by: INTERNAL MEDICINE

## 2024-03-06 PROCEDURE — 1126F AMNT PAIN NOTED NONE PRSNT: CPT | Mod: CPTII,S$GLB,, | Performed by: INTERNAL MEDICINE

## 2024-03-06 PROCEDURE — 99999 PR PBB SHADOW E&M-EST. PATIENT-LVL IV: CPT | Mod: PBBFAC,,, | Performed by: INTERNAL MEDICINE

## 2024-03-06 PROCEDURE — 82570 ASSAY OF URINE CREATININE: CPT | Performed by: INTERNAL MEDICINE

## 2024-03-06 PROCEDURE — 3066F NEPHROPATHY DOC TX: CPT | Mod: CPTII,S$GLB,, | Performed by: SURGERY

## 2024-03-06 PROCEDURE — 1159F MED LIST DOCD IN RCRD: CPT | Mod: CPTII,S$GLB,, | Performed by: SURGERY

## 2024-03-06 PROCEDURE — 3077F SYST BP >= 140 MM HG: CPT | Mod: CPTII,S$GLB,, | Performed by: INTERNAL MEDICINE

## 2024-03-06 PROCEDURE — 81001 URINALYSIS AUTO W/SCOPE: CPT | Performed by: INTERNAL MEDICINE

## 2024-03-06 PROCEDURE — 3077F SYST BP >= 140 MM HG: CPT | Mod: CPTII,S$GLB,, | Performed by: SURGERY

## 2024-03-06 PROCEDURE — 1126F AMNT PAIN NOTED NONE PRSNT: CPT | Mod: CPTII,S$GLB,, | Performed by: SURGERY

## 2024-03-06 PROCEDURE — 1101F PT FALLS ASSESS-DOCD LE1/YR: CPT | Mod: CPTII,S$GLB,, | Performed by: INTERNAL MEDICINE

## 2024-03-06 PROCEDURE — 3078F DIAST BP <80 MM HG: CPT | Mod: CPTII,S$GLB,, | Performed by: INTERNAL MEDICINE

## 2024-03-06 PROCEDURE — 3066F NEPHROPATHY DOC TX: CPT | Mod: CPTII,S$GLB,, | Performed by: INTERNAL MEDICINE

## 2024-03-06 PROCEDURE — 3008F BODY MASS INDEX DOCD: CPT | Mod: CPTII,S$GLB,, | Performed by: INTERNAL MEDICINE

## 2024-03-06 PROCEDURE — 99203 OFFICE O/P NEW LOW 30 MIN: CPT | Mod: S$GLB,,, | Performed by: SURGERY

## 2024-03-06 PROCEDURE — 3008F BODY MASS INDEX DOCD: CPT | Mod: CPTII,S$GLB,, | Performed by: SURGERY

## 2024-03-06 PROCEDURE — 4010F ACE/ARB THERAPY RXD/TAKEN: CPT | Mod: CPTII,S$GLB,, | Performed by: INTERNAL MEDICINE

## 2024-03-06 PROCEDURE — 1160F RVW MEDS BY RX/DR IN RCRD: CPT | Mod: CPTII,S$GLB,, | Performed by: SURGERY

## 2024-03-06 RX ORDER — BENAZEPRIL HYDROCHLORIDE 20 MG/1
20 TABLET ORAL DAILY
Qty: 90 TABLET | Refills: 3 | Status: SHIPPED | OUTPATIENT
Start: 2024-03-06 | End: 2024-04-29

## 2024-03-06 RX ORDER — METOPROLOL SUCCINATE 50 MG/1
50 TABLET, EXTENDED RELEASE ORAL DAILY
Qty: 30 TABLET | Refills: 11 | Status: SHIPPED | OUTPATIENT
Start: 2024-03-06 | End: 2024-04-26

## 2024-03-07 ENCOUNTER — TELEPHONE (OUTPATIENT)
Dept: NEPHROLOGY | Facility: CLINIC | Age: 69
End: 2024-03-07
Payer: COMMERCIAL

## 2024-03-07 ENCOUNTER — PATIENT MESSAGE (OUTPATIENT)
Dept: FAMILY MEDICINE | Facility: CLINIC | Age: 69
End: 2024-03-07
Payer: COMMERCIAL

## 2024-03-07 ENCOUNTER — TELEPHONE (OUTPATIENT)
Dept: FAMILY MEDICINE | Facility: CLINIC | Age: 69
End: 2024-03-07
Payer: COMMERCIAL

## 2024-03-07 PROBLEM — N18.31 CHRONIC KIDNEY DISEASE, STAGE 3A: Status: ACTIVE | Noted: 2024-03-07

## 2024-03-07 NOTE — TELEPHONE ENCOUNTER
----- Message from Leatha Taveras sent at 3/7/2024 11:35 AM CST -----  Contact: self  Patient requesting a call back regarding his appointments he wants them verified. I informed him of his appointments that he has scheduled but he still would like a call from the staff. Please call back @ [phone]

## 2024-03-07 NOTE — TELEPHONE ENCOUNTER
----- Message from Christopher Dwyer MD sent at 3/7/2024  8:37 AM CST -----  Please repeat renal panel and CBC today. Please call the pt to go to lab today. Thanks (and return to clinic in March or April 2024)

## 2024-03-07 NOTE — TELEPHONE ENCOUNTER
----- Message from Christopher Dwyer MD sent at 3/7/2024 10:46 AM CST -----  Sooner will be better, 30th is too far out.  ----- Message -----  From: Dipti Garrido LPN  Sent: 3/7/2024   9:15 AM CST  To: Christopher Dwyer MD    April 3oth ok?  ----- Message -----  From: Christopher Dwyer MD  Sent: 3/7/2024   8:38 AM CST  To: Dipti Garrido LPN    Please repeat renal panel and CBC today. Please call the pt to go to lab today. Thanks (and return to clinic in March or April 2024)

## 2024-03-07 NOTE — PROGRESS NOTES
Jose Long is a 68 y.o. male for whom nephrology consult has been requested to evaluate and give opinion.   Renal clinic consult note:  Date of consult: 3/6/24  Date of this note: 3/7/24  Referring physician: / Harshad Hernandez    HPI: Thank you for referring the pt to us. H/o and chart were reviewed. Pt was seen and examined. Pt is a 69 y/o male with h/o HTN who was sent to renal clinic for s Cr fluctuatiosn between 1.2 and 1.6 in the past 10 months. Pt has no active c/o's, except taht he had diarrhea after he had taken lactulose for constipation. He was always chronically constipated. Pt denies using NSAIDs, no abx, no recent infections. Pt was sent to [facility] to do u/a and urine Na and Cr to calculate FENa. Curiously, lab reported Ca oxalate crystals in the urine. Pt was called at home by me this morning. I spoke directly to him. He denies any h/o of nephrolithiasis, denies food fetishes rich in oxalate (berries, leak, etc), also importantly denies using accidentally or oi/w anti-freeze. Feels well today.    PAST MEDICAL HISTORY:  He  has a past medical history of Dyslipidemia, Hypertension, and Multiple allergies.    PAST SURGICAL HISTORY:  He  has a past surgical history that includes Colonoscopy (N/A, 6/29/2018) and Colonoscopy (N/A, 5/9/2023).    SOCIAL HISTORY:  He  reports that he has quit smoking. He has never used smokeless tobacco. He reports that he does not drink alcohol and does not use drugs.    FAMILY MEDICAL HISTORY:  His family history includes Alzheimer's disease in his mother; Cataracts in his mother; Colon cancer in his father; Diabetes in his maternal aunt and paternal grandmother; Hypertension in his mother; Macular degeneration in his maternal aunt.    Review of patient's allergies indicates:  No Known Allergies        Prior to Admission medications    Medication Sig Start Date End Date Taking? Authorizing Provider   acetaminophen (TYLENOL ARTHRITIS PAIN ORAL) Take by mouth daily as  "needed.   Yes Provider, Historical   carbidopa-levodopa  mg (SINEMET)  mg per tablet Take 1 tablet by mouth 3 (three) times daily. 9/19/22  Yes Provider, Historical   cetirizine (ZYRTEC) 10 MG tablet Take 1 tablet by mouth Daily. 1 Tablet Oral Every day.  To get over-the-counter   Yes Provider, Historical   ketoconazole (NIZORAL) 2 % shampoo Apply topically twice a week. 11/9/23  Yes Harshad Hernandez MD   lactulose (CHRONULAC) 20 gram/30 mL Soln Take 15 mLs (10 g total) by mouth 3 (three) times daily as needed. 2/29/24  Yes Harshad Hernandez MD   memantine (NAMENDA) 10 MG Tab Take 10 mg by mouth 2 (two) times daily. 10/16/22  Yes Provider, Historical   benazepriL (LOTENSIN) 20 MG tablet Take 1 tablet (20 mg total) by mouth once daily. 3/6/24 3/6/25  Christopher Dwyer MD   metoprolol succinate (TOPROL-XL) 50 MG 24 hr tablet Take 1 tablet (50 mg total) by mouth once daily. 3/6/24 3/6/25  Christopher Dwyer MD   rivastigmine tartrate (EXELON) 1.5 MG capsule Take 1.5 mg by mouth 2 (two) times daily. 3/17/23   Provider, Historical        REVIEW OF SYSTEMS:  Patient has no fever, fatigue, visual changes, chest pain, edema, cough, dyspnea, nausea, vomiting, constipation, diarrhea, arthralgias, pruritis, dizziness, weakness, depression, confusion.    PHYSICAL EXAM:   height is 5' 10" (1.778 m) and weight is 63.5 kg (140 lb). His blood pressure is 140/64 (abnormal) and his pulse is 71. His respiration is 18.   Gen: WDWN male in no apparent distress  Psych: Normal mood and affect  Skin: No rashes or ulcers  Neck: No JVD  Chest: Clear with no rales, rhonchi, wheezing with normal effort  CV: Regular with no murmurs, gallops or rubs  Abd: Soft, nontender, no distension  Ext: No edema    Labs reviewed  BMP  Lab Results   Component Value Date     02/27/2024    K 3.8 02/27/2024     02/27/2024    CO2 28 02/27/2024    BUN 22 02/27/2024    CREATININE 1.6 (H) 02/27/2024    CALCIUM 9.3 02/27/2024    " ANIONGAP 13 02/27/2024    EGFRNORACEVR 46.1 (A) 02/27/2024     Lab Results   Component Value Date    WBC 7.92 02/27/2024    HGB 12.1 (L) 02/27/2024    HCT 36.1 (L) 02/27/2024    MCV 94 02/27/2024     02/27/2024     U Na 52, U Cr 316, Wendy 0.2%  U/a: 1+ protein, no blood, 6 RBC's, 12 hyaline casts, noted Ca oxalate crystals  PSA normal    IMPRESSION AND RECOMMENDATIONS:  67 y/o male with s Cr fluctuations presented for evaluation:    Renal: FENa <1%, c/w prerenal azotemia, c/w having had diarrhea  Diarrhea due to lactulose, has resolved  PSA normal    Curiously has Ca oxalate crystal in urine:  No h/o of kidney stones, no food fetishes rich in oxalate, no anti-freeze (ethylene glycol)  Called the pt  Will send pt back to lab today, pt verbalized understanding    2. HTn: Meds reviewed. BP controlled    3. Constipation: may be secondary to amlodipine  D/c amlodipine  Add toprol XL 50 mg po qd    Plans and recommendations:  As discussed above  Total time spent 60 minutes including time needed to review the records, the   patient evaluation, documentation, face-to-face discussion with the patient,   more than 50% of the time was spent on coordination of care and counseling.    Level V visit.  RTC within few weeks  Will chart check for the labs today    Christopher Dwyer MD

## 2024-03-07 NOTE — TELEPHONE ENCOUNTER
"Per dr morales asked pt to do lab today. He said he can go tomorrow". Scheduled lab. He has appt for April 30th. 3/7/24/sf  "

## 2024-03-08 ENCOUNTER — LAB VISIT (OUTPATIENT)
Dept: LAB | Facility: HOSPITAL | Age: 69
End: 2024-03-08
Attending: INTERNAL MEDICINE
Payer: COMMERCIAL

## 2024-03-08 DIAGNOSIS — N17.9 AKI (ACUTE KIDNEY INJURY): ICD-10-CM

## 2024-03-08 LAB
ALBUMIN SERPL BCP-MCNC: 3.9 G/DL (ref 3.5–5.2)
ANION GAP SERPL CALC-SCNC: 13 MMOL/L (ref 8–16)
BASOPHILS # BLD AUTO: 0.03 K/UL (ref 0–0.2)
BASOPHILS NFR BLD: 0.3 % (ref 0–1.9)
BUN SERPL-MCNC: 25 MG/DL (ref 8–23)
CALCIUM SERPL-MCNC: 9.4 MG/DL (ref 8.7–10.5)
CHLORIDE SERPL-SCNC: 106 MMOL/L (ref 95–110)
CO2 SERPL-SCNC: 28 MMOL/L (ref 23–29)
CREAT SERPL-MCNC: 1.7 MG/DL (ref 0.5–1.4)
DIFFERENTIAL METHOD BLD: ABNORMAL
EOSINOPHIL # BLD AUTO: 0.2 K/UL (ref 0–0.5)
EOSINOPHIL NFR BLD: 1.9 % (ref 0–8)
ERYTHROCYTE [DISTWIDTH] IN BLOOD BY AUTOMATED COUNT: 12.6 % (ref 11.5–14.5)
EST. GFR  (NO RACE VARIABLE): 42.8 ML/MIN/1.73 M^2
GLUCOSE SERPL-MCNC: 85 MG/DL (ref 70–110)
HCT VFR BLD AUTO: 36.1 % (ref 40–54)
HGB BLD-MCNC: 11.9 G/DL (ref 14–18)
IMM GRANULOCYTES # BLD AUTO: 0.03 K/UL (ref 0–0.04)
IMM GRANULOCYTES NFR BLD AUTO: 0.3 % (ref 0–0.5)
LYMPHOCYTES # BLD AUTO: 1.7 K/UL (ref 1–4.8)
LYMPHOCYTES NFR BLD: 19 % (ref 18–48)
MCH RBC QN AUTO: 31 PG (ref 27–31)
MCHC RBC AUTO-ENTMCNC: 33 G/DL (ref 32–36)
MCV RBC AUTO: 94 FL (ref 82–98)
MONOCYTES # BLD AUTO: 0.7 K/UL (ref 0.3–1)
MONOCYTES NFR BLD: 7.2 % (ref 4–15)
NEUTROPHILS # BLD AUTO: 6.5 K/UL (ref 1.8–7.7)
NEUTROPHILS NFR BLD: 71.3 % (ref 38–73)
NRBC BLD-RTO: 0 /100 WBC
PHOSPHATE SERPL-MCNC: 3.6 MG/DL (ref 2.7–4.5)
PLATELET # BLD AUTO: 290 K/UL (ref 150–450)
PMV BLD AUTO: 10.7 FL (ref 9.2–12.9)
POTASSIUM SERPL-SCNC: 3.6 MMOL/L (ref 3.5–5.1)
RBC # BLD AUTO: 3.84 M/UL (ref 4.6–6.2)
SODIUM SERPL-SCNC: 147 MMOL/L (ref 136–145)
WBC # BLD AUTO: 9.06 K/UL (ref 3.9–12.7)

## 2024-03-08 PROCEDURE — 80069 RENAL FUNCTION PANEL: CPT | Performed by: INTERNAL MEDICINE

## 2024-03-08 PROCEDURE — 85025 COMPLETE CBC W/AUTO DIFF WBC: CPT | Performed by: INTERNAL MEDICINE

## 2024-03-08 PROCEDURE — 36415 COLL VENOUS BLD VENIPUNCTURE: CPT | Mod: PO | Performed by: INTERNAL MEDICINE

## 2024-03-11 RX ORDER — LACTULOSE 10 G/15ML
10 SOLUTION ORAL 3 TIMES DAILY PRN
Qty: 60 ML | Refills: 2 | Status: SHIPPED | OUTPATIENT
Start: 2024-03-11

## 2024-03-11 NOTE — TELEPHONE ENCOUNTER
No care due was identified.  [facility] Embedded Care Due Messages. Reference number: [id number].   3/11/2024 3:28:38 PM CDT

## 2024-03-11 NOTE — PROGRESS NOTES
History & Physical    SUBJECTIVE:     History of Present Illness:  Patient is a 68 y.o. male referred for gallstones.  He underwent CT scan for weight loss and noted to have gallstones on his CT scan.  He reports less appetite at times than normal but denies any abdominal pain.  No specific postprandial food associations decreased appetite.  Denies any epigastric or right upper quadrant abdominal pain; no nausea or emesis, no change in his bowels although he has chronic constipation and has been going to the restroom well with laxatives as needed.  Previously diagnosed with Alzheimer's dementia and Parkinson's?    No chief complaint on file.      Review of patient's allergies indicates:  No Known Allergies    Current Outpatient Medications   Medication Sig Dispense Refill    acetaminophen (TYLENOL ARTHRITIS PAIN ORAL) Take by mouth daily as needed.      benazepriL (LOTENSIN) 20 MG tablet Take 1 tablet (20 mg total) by mouth once daily. 90 tablet 3    carbidopa-levodopa  mg (SINEMET)  mg per tablet Take 1 tablet by mouth 3 (three) times daily.      cetirizine (ZYRTEC) 10 MG tablet Take 1 tablet by mouth Daily. 1 Tablet Oral Every day.  To get over-the-counter      ketoconazole (NIZORAL) 2 % shampoo Apply topically twice a week. 120 mL 2    lactulose (CHRONULAC) 20 gram/30 mL Soln Take 15 mLs (10 g total) by mouth 3 (three) times daily as needed. 60 mL 2    memantine (NAMENDA) 10 MG Tab Take 10 mg by mouth 2 (two) times daily.      metoprolol succinate (TOPROL-XL) 50 MG 24 hr tablet Take 1 tablet (50 mg total) by mouth once daily. 30 tablet 11     No current facility-administered medications for this visit.       Past Medical History:   Diagnosis Date    Dyslipidemia     Hypertension     Multiple allergies      Past Surgical History:   Procedure Laterality Date    COLONOSCOPY N/A 6/29/2018    Procedure: COLONOSCOPY;  Surgeon: Kermit Leone MD;  Location: [facility];  Service: Endoscopy;  Laterality: N/A;  "   COLONOSCOPY N/A 5/9/2023    Procedure: COLONOSCOPY;  Surgeon: Jazmin Kraus MD;  Location: [facility];  Service: Endoscopy;  Laterality: N/A;     Family History   Problem Relation Age of Onset    Cataracts Mother     Hypertension Mother     Alzheimer's disease Mother     Diabetes Maternal Aunt     Macular degeneration Maternal Aunt     Diabetes Paternal Grandmother     Colon cancer Father     Melanoma Neg Hx      Social History     Tobacco Use    Smoking status: Former    Smokeless tobacco: Never   Substance Use Topics    Alcohol use: No    Drug use: No        Review of Systems:  Review of Systems   Constitutional:  Negative for chills, fever and unexpected weight change.   HENT:  Negative for congestion.    Eyes:  Negative for visual disturbance.   Respiratory:  Negative for shortness of breath.    Cardiovascular:  Negative for chest pain.   Gastrointestinal:  Negative for abdominal distention, abdominal pain, constipation, nausea, rectal pain and vomiting.   Genitourinary:  Negative for dysuria.   Musculoskeletal:  Negative for arthralgias.   Skin:  Negative for rash.   Neurological:  Negative for light-headedness.   Hematological:  Negative for adenopathy.       OBJECTIVE:     Vital Signs (Most Recent)  Temp: 97.6 °F (36.4 °C) (03/06/24 1250)  Pulse: 63 (03/06/24 1250)  BP: (!) 144/61 (03/06/24 1250)  5' 10" (1.778 m)  63.8 kg (140 lb 10.5 oz)     Physical Exam:  Physical Exam  Vitals reviewed.   Constitutional:       Appearance: He is well-developed.   HENT:      Head: Normocephalic and atraumatic.   Cardiovascular:      Rate and Rhythm: Normal rate.   Pulmonary:      Effort: Pulmonary effort is normal.   Abdominal:      General: There is no distension.      Palpations: Abdomen is soft.      Tenderness: There is no abdominal tenderness.   Musculoskeletal:      Cervical back: Normal range of motion and neck supple.   Skin:     General: Skin is warm and dry.   Neurological:      Mental Status: He is alert and " oriented to person, place, and time.       Diagnostic Results:  CT:  Impression:     Gallbladder is completely filled with stones.  No appreciable pericholecystic fluid or gallbladder wall thickening within limits of noncontrast CT.  Consider further evaluation with right upper quadrant ultrasound and/or HIDA scan as clinically warranted.     Large volume stool throughout the colon sigmoid and rectum.  6.5 cm transverse diameter developing rectal fecal impaction.       ASSESSMENT/PLAN:     68-year-old male with gallstones    PLAN:Plan     Imaging reviewed with patient.  Does not appear to have classic history for gallstones although he does have some decreased appetite.  We discussed common symptoms of symptomatic gallstones/biliary colic.  We also discussed options for cholecystectomy versus continued monitoring.  Due to his lack of specific symptoms he would like to monitor his gallbladder at this time.  He will call if he develops any symptoms or changes his mind and would like to move forward with cholecystectomy    Constipation - significant stool burden on recent CT.  He has been taking laxatives and going regularly.  He does not have any crampy abdominal pain or symptoms.  Continue current therapy for chronic constipation

## 2024-03-23 ENCOUNTER — OFFICE VISIT (OUTPATIENT)
Dept: URGENT CARE | Facility: CLINIC | Age: 69
End: 2024-03-23
Payer: COMMERCIAL

## 2024-03-23 VITALS
OXYGEN SATURATION: 99 % | WEIGHT: 140.63 LBS | DIASTOLIC BLOOD PRESSURE: 80 MMHG | HEIGHT: 70 IN | RESPIRATION RATE: 18 BRPM | TEMPERATURE: 98 F | BODY MASS INDEX: 20.13 KG/M2 | SYSTOLIC BLOOD PRESSURE: 126 MMHG | HEART RATE: 60 BPM

## 2024-03-23 DIAGNOSIS — R53.83 FATIGUE, UNSPECIFIED TYPE: Primary | ICD-10-CM

## 2024-03-23 DIAGNOSIS — J06.9 VIRAL URI WITH COUGH: ICD-10-CM

## 2024-03-23 LAB
CTP QC/QA: YES
SARS-COV-2 AG RESP QL IA.RAPID: NEGATIVE

## 2024-03-23 PROCEDURE — 87811 SARS-COV-2 COVID19 W/OPTIC: CPT | Mod: QW,S$GLB,, | Performed by: PHYSICIAN ASSISTANT

## 2024-03-23 PROCEDURE — 99214 OFFICE O/P EST MOD 30 MIN: CPT | Mod: S$GLB,,, | Performed by: PHYSICIAN ASSISTANT

## 2024-03-23 RX ORDER — BENZONATATE 100 MG/1
100 CAPSULE ORAL 3 TIMES DAILY PRN
Qty: 15 CAPSULE | Refills: 0 | Status: SHIPPED | OUTPATIENT
Start: 2024-03-23 | End: 2024-03-28

## 2024-03-23 RX ORDER — AZELASTINE 1 MG/ML
1 SPRAY, METERED NASAL 2 TIMES DAILY
Qty: 30 ML | Refills: 0 | Status: SHIPPED | OUTPATIENT
Start: 2024-03-23 | End: 2025-03-23

## 2024-03-23 NOTE — PATIENT INSTRUCTIONS
Repeat home covid test in 48 hours if symptoms worsen or fever.    Tessalon for cough up to 3x daily, astelin nasal spray 1 spray/nostril 2x daily for runny nose or post nasal drip.     Follow up with primary care doctor this week if fatigue or weakness worsens.    He needs more urgent re-eval if chest pain, shortness of breath or severe weakness.

## 2024-03-23 NOTE — PROGRESS NOTES
"Subjective:      Patient ID: Jose Long is a 68 y.o. male.    Vitals:  height is 5' 10" (1.778 m) and weight is 63.8 kg (140 lb 10.5 oz). His tympanic temperature is 97.8 °F (36.6 °C). His blood pressure is 126/80 and his pulse is 60. His respiration is 18 and oxygen saturation is 99%.     Chief Complaint: Fatigue    Patient presents with fatigue x4 days.  Runny nose and dry cough. + covid exposure. No fever, CP, SOB. No NVD or urinary complaints. Hx of anemia and DEBRA    Fatigue  This is a new problem. The current episode started in the past 7 days (4). The problem has been gradually worsening. Associated symptoms include fatigue. Pertinent negatives include no congestion, coughing or sore throat. Associated symptoms comments: Runny nose. He has tried acetaminophen for the symptoms. The treatment provided mild relief.       Constitution: Positive for fatigue and generalized weakness.   HENT:  Negative for congestion and sore throat.    Respiratory:  Negative for cough, sputum production and shortness of breath.       Objective:     Physical Exam   Constitutional: He is oriented to person, place, and time. He appears well-developed.   HENT:   Head: Normocephalic and atraumatic.   Ears:   Right Ear: External ear normal.   Left Ear: External ear normal.   Nose: Rhinorrhea present.   Mouth/Throat: Mucous membranes are normal. Mucous membranes are moist. Oropharynx is clear.   Eyes: Conjunctivae and lids are normal.   Neck: Trachea normal. Neck supple.   Cardiovascular: Normal rate, regular rhythm and normal heart sounds.   Pulmonary/Chest: Effort normal and breath sounds normal. No respiratory distress. He has no wheezes.   Abdominal: Normal appearance and bowel sounds are normal. He exhibits no distension and no mass. Soft. There is no abdominal tenderness. There is no rebound and no guarding.   Musculoskeletal: Normal range of motion.         General: Normal range of motion.   Neurological: He is alert and oriented " to person, place, and time. He has normal strength.   Skin: Skin is warm, dry, intact, not diaphoretic and not pale.   Psychiatric: His speech is normal and behavior is normal. Judgment and thought content normal.   Nursing note and vitals reviewed.    Results for orders placed or performed in visit on 03/23/24   SARS Coronavirus 2 Antigen, POCT Manual Read   Result Value Ref Range    SARS Coronavirus 2 Antigen Negative Negative     Acceptable Yes        Assessment:     1. Fatigue, unspecified type    2. Viral URI with cough        Plan:       Fatigue, unspecified type  -     SARS Coronavirus 2 Antigen, POCT Manual Read    Viral URI with cough  -     SARS Coronavirus 2 Antigen, POCT Manual Read  -     azelastine (ASTELIN) 137 mcg (0.1 %) nasal spray; 1 spray (137 mcg total) by Nasal route 2 (two) times daily.  Dispense: 30 mL; Refill: 0  -     benzonatate (TESSALON) 100 MG capsule; Take 1 capsule (100 mg total) by mouth 3 (three) times daily as needed for Cough.  Dispense: 15 capsule; Refill: 0    Caroline Fusilier PA-C [facility]       Patient Instructions   Repeat home covid test in 48 hours if symptoms worsen or fever.    Tessalon for cough up to 3x daily, astelin nasal spray 1 spray/nostril 2x daily for runny nose or post nasal drip.     Follow up with primary care doctor this week if fatigue or weakness worsens.    He needs more urgent re-eval if chest pain, shortness of breath or severe weakness.

## 2024-03-26 ENCOUNTER — TELEPHONE (OUTPATIENT)
Dept: HEMATOLOGY/ONCOLOGY | Facility: CLINIC | Age: 69
End: 2024-03-26
Payer: COMMERCIAL

## 2024-03-27 ENCOUNTER — TELEPHONE (OUTPATIENT)
Dept: HEMATOLOGY/ONCOLOGY | Facility: CLINIC | Age: 69
End: 2024-03-27
Payer: COMMERCIAL

## 2024-03-27 NOTE — TELEPHONE ENCOUNTER
----- Message from Ana M Galvez LPN sent at 3/26/2024  4:59 PM CDT -----  Contact:  [phone]    ----- Message -----  From: Francine Brady  Sent: 3/26/2024   3:52 PM CDT  To: Darcy Pelayo Staff    Patient call back after missing a  call from your office please call back [phone] CELL NUMBER  [phone]. Thanks IJ

## 2024-03-27 NOTE — TELEPHONE ENCOUNTER
----- Message from Archana May sent at 3/27/2024  7:12 AM CDT -----  Same Day Appointment Request     Caller Is Requesting A Same Day Appointment      Caller Declined First Available Appointment? 5/2024    Best Call Back Number?  [phone]    Additional Information:       Thank You

## 2024-04-23 ENCOUNTER — PATIENT MESSAGE (OUTPATIENT)
Dept: HEMATOLOGY/ONCOLOGY | Facility: CLINIC | Age: 69
End: 2024-04-23
Payer: COMMERCIAL

## 2024-04-24 ENCOUNTER — LAB VISIT (OUTPATIENT)
Dept: LAB | Facility: HOSPITAL | Age: 69
End: 2024-04-24
Attending: INTERNAL MEDICINE
Payer: COMMERCIAL

## 2024-04-24 DIAGNOSIS — N17.9 AKI (ACUTE KIDNEY INJURY): ICD-10-CM

## 2024-04-24 LAB
ALBUMIN SERPL BCP-MCNC: 3.8 G/DL (ref 3.5–5.2)
ANION GAP SERPL CALC-SCNC: 12 MMOL/L (ref 8–16)
BASOPHILS # BLD AUTO: 0.03 K/UL (ref 0–0.2)
BASOPHILS NFR BLD: 0.4 % (ref 0–1.9)
BUN SERPL-MCNC: 20 MG/DL (ref 8–23)
CALCIUM SERPL-MCNC: 9.7 MG/DL (ref 8.7–10.5)
CHLORIDE SERPL-SCNC: 102 MMOL/L (ref 95–110)
CO2 SERPL-SCNC: 33 MMOL/L (ref 23–29)
CREAT SERPL-MCNC: 1.5 MG/DL (ref 0.5–1.4)
DIFFERENTIAL METHOD BLD: ABNORMAL
EOSINOPHIL # BLD AUTO: 0.2 K/UL (ref 0–0.5)
EOSINOPHIL NFR BLD: 2.5 % (ref 0–8)
ERYTHROCYTE [DISTWIDTH] IN BLOOD BY AUTOMATED COUNT: 12 % (ref 11.5–14.5)
EST. GFR  (NO RACE VARIABLE): 49.8 ML/MIN/1.73 M^2
GLUCOSE SERPL-MCNC: 86 MG/DL (ref 70–110)
HCT VFR BLD AUTO: 38.6 % (ref 40–54)
HGB BLD-MCNC: 13 G/DL (ref 14–18)
IMM GRANULOCYTES # BLD AUTO: 0.01 K/UL (ref 0–0.04)
IMM GRANULOCYTES NFR BLD AUTO: 0.1 % (ref 0–0.5)
LYMPHOCYTES # BLD AUTO: 1.5 K/UL (ref 1–4.8)
LYMPHOCYTES NFR BLD: 20 % (ref 18–48)
MCH RBC QN AUTO: 31 PG (ref 27–31)
MCHC RBC AUTO-ENTMCNC: 33.7 G/DL (ref 32–36)
MCV RBC AUTO: 92 FL (ref 82–98)
MONOCYTES # BLD AUTO: 0.5 K/UL (ref 0.3–1)
MONOCYTES NFR BLD: 6.8 % (ref 4–15)
NEUTROPHILS # BLD AUTO: 5.2 K/UL (ref 1.8–7.7)
NEUTROPHILS NFR BLD: 70.2 % (ref 38–73)
NRBC BLD-RTO: 0 /100 WBC
PHOSPHATE SERPL-MCNC: 3.6 MG/DL (ref 2.7–4.5)
PLATELET # BLD AUTO: 270 K/UL (ref 150–450)
PMV BLD AUTO: 10.4 FL (ref 9.2–12.9)
POTASSIUM SERPL-SCNC: 3.1 MMOL/L (ref 3.5–5.1)
RBC # BLD AUTO: 4.2 M/UL (ref 4.6–6.2)
SODIUM SERPL-SCNC: 147 MMOL/L (ref 136–145)
WBC # BLD AUTO: 7.46 K/UL (ref 3.9–12.7)

## 2024-04-24 PROCEDURE — 80069 RENAL FUNCTION PANEL: CPT | Performed by: INTERNAL MEDICINE

## 2024-04-24 PROCEDURE — 85025 COMPLETE CBC W/AUTO DIFF WBC: CPT | Performed by: INTERNAL MEDICINE

## 2024-04-24 PROCEDURE — 36415 COLL VENOUS BLD VENIPUNCTURE: CPT | Mod: PO | Performed by: INTERNAL MEDICINE

## 2024-04-26 ENCOUNTER — LAB VISIT (OUTPATIENT)
Dept: LAB | Facility: HOSPITAL | Age: 69
End: 2024-04-26
Attending: INTERNAL MEDICINE
Payer: COMMERCIAL

## 2024-04-26 ENCOUNTER — OFFICE VISIT (OUTPATIENT)
Dept: NEPHROLOGY | Facility: CLINIC | Age: 69
End: 2024-04-26
Payer: COMMERCIAL

## 2024-04-26 VITALS
SYSTOLIC BLOOD PRESSURE: 102 MMHG | DIASTOLIC BLOOD PRESSURE: 64 MMHG | RESPIRATION RATE: 18 BRPM | BODY MASS INDEX: 18.97 KG/M2 | HEIGHT: 70 IN | WEIGHT: 132.5 LBS | HEART RATE: 66 BPM

## 2024-04-26 DIAGNOSIS — N17.9 AKI (ACUTE KIDNEY INJURY): Primary | ICD-10-CM

## 2024-04-26 DIAGNOSIS — N17.9 AKI (ACUTE KIDNEY INJURY): ICD-10-CM

## 2024-04-26 LAB
BACTERIA #/AREA URNS HPF: NORMAL /HPF
BILIRUB UR QL STRIP: NEGATIVE
CLARITY UR: CLEAR
COLOR UR: YELLOW
GLUCOSE UR QL STRIP: NEGATIVE
HGB UR QL STRIP: NEGATIVE
HYALINE CASTS #/AREA URNS LPF: 0 /LPF
KETONES UR QL STRIP: ABNORMAL
LEUKOCYTE ESTERASE UR QL STRIP: NEGATIVE
MICROSCOPIC COMMENT: NORMAL
NITRITE UR QL STRIP: NEGATIVE
PH UR STRIP: 6 [PH] (ref 5–8)
PROT UR QL STRIP: ABNORMAL
RBC #/AREA URNS HPF: 3 /HPF (ref 0–4)
SP GR UR STRIP: 1.02 (ref 1–1.03)
URN SPEC COLLECT METH UR: ABNORMAL
WBC #/AREA URNS HPF: 3 /HPF (ref 0–5)

## 2024-04-26 PROCEDURE — 3288F FALL RISK ASSESSMENT DOCD: CPT | Mod: CPTII,S$GLB,, | Performed by: INTERNAL MEDICINE

## 2024-04-26 PROCEDURE — 4010F ACE/ARB THERAPY RXD/TAKEN: CPT | Mod: CPTII,S$GLB,, | Performed by: INTERNAL MEDICINE

## 2024-04-26 PROCEDURE — 99215 OFFICE O/P EST HI 40 MIN: CPT | Mod: S$GLB,,, | Performed by: INTERNAL MEDICINE

## 2024-04-26 PROCEDURE — 1126F AMNT PAIN NOTED NONE PRSNT: CPT | Mod: CPTII,S$GLB,, | Performed by: INTERNAL MEDICINE

## 2024-04-26 PROCEDURE — 3074F SYST BP LT 130 MM HG: CPT | Mod: CPTII,S$GLB,, | Performed by: INTERNAL MEDICINE

## 2024-04-26 PROCEDURE — 3008F BODY MASS INDEX DOCD: CPT | Mod: CPTII,S$GLB,, | Performed by: INTERNAL MEDICINE

## 2024-04-26 PROCEDURE — 3066F NEPHROPATHY DOC TX: CPT | Mod: CPTII,S$GLB,, | Performed by: INTERNAL MEDICINE

## 2024-04-26 PROCEDURE — 1159F MED LIST DOCD IN RCRD: CPT | Mod: CPTII,S$GLB,, | Performed by: INTERNAL MEDICINE

## 2024-04-26 PROCEDURE — 81000 URINALYSIS NONAUTO W/SCOPE: CPT | Performed by: INTERNAL MEDICINE

## 2024-04-26 PROCEDURE — 1100F PTFALLS ASSESS-DOCD GE2>/YR: CPT | Mod: CPTII,S$GLB,, | Performed by: INTERNAL MEDICINE

## 2024-04-26 PROCEDURE — 99999 PR PBB SHADOW E&M-EST. PATIENT-LVL III: CPT | Mod: PBBFAC,,, | Performed by: INTERNAL MEDICINE

## 2024-04-26 PROCEDURE — 3078F DIAST BP <80 MM HG: CPT | Mod: CPTII,S$GLB,, | Performed by: INTERNAL MEDICINE

## 2024-04-26 RX ORDER — AMLODIPINE BESYLATE AND BENAZEPRIL HYDROCHLORIDE 2.5; 1 MG/1; MG/1
1 CAPSULE ORAL DAILY
COMMUNITY
End: 2024-05-15

## 2024-04-26 RX ORDER — METOPROLOL SUCCINATE 25 MG/1
25 TABLET, EXTENDED RELEASE ORAL DAILY
Qty: 30 TABLET | Refills: 11 | Status: SHIPPED | OUTPATIENT
Start: 2024-04-26 | End: 2024-05-13

## 2024-04-29 NOTE — PROGRESS NOTES
"Renal clinic consult f/u note:  Date of Pt visit: 4/26/24  Date of this note: 4/29/24  Reason for f/u and chief c/o: DEBRA, renal insufficiency  Referring physician: / Harshad Hernandez     HPI: Pt is a 67 y/o male with h/o HTN who was initially sent to renal clinic for mild increase in s Cr. Pt was seen about 1 month ago. He reported diarrhea after had taken lactulose for constipation. s Cr had fluctuated between 1.2 and 1.6 prior to visit. He reported almost chronic constipation. Pt denies using NSAIDs, no abx, no recent infections. FENa calculated was 0.2% c/w with prerenal azotemia. Curiously also, lab reported Ca oxalate crystals in the urine. He denied any h/o of nephrolithiasis, denies food fetishes rich in oxalate (berries, leak, etc), confirmed again at the time of this visit. Also importantly denied using accidentally or o/w anti-freeze. Felt well at the time of this visit, no c/o's. Reported "less diarrhea."     PAST MEDICAL HISTORY:  He  has a past medical history of Dyslipidemia, Hypertension, and Multiple allergies.     PAST SURGICAL HISTORY:  He  has a past surgical history that includes Colonoscopy (N/A, 6/29/2018) and Colonoscopy (N/A, 5/9/2023).     SOCIAL HISTORY:  He  reports that he has quit smoking. He has never used smokeless tobacco. He reports that he does not drink alcohol and does not use drugs.     FAMILY MEDICAL HISTORY:  His family history includes Alzheimer's disease in his mother; Cataracts in his mother; Colon cancer in his father; Diabetes in his maternal aunt and paternal grandmother; Hypertension in his mother; Macular degeneration in his maternal aunt.     Review of patient's allergies indicates:  No Known Allergies     Meds reviewed    Current Outpatient Medications:     acetaminophen (TYLENOL ARTHRITIS PAIN ORAL), Take by mouth daily as needed., Disp: , Rfl:     amlodipine-benazepril 2.5-10 mg (LOTREL) 2.5-10 mg per capsule, Take 1 capsule by mouth once daily., Disp: , Rfl: " "    carbidopa-levodopa  mg (SINEMET)  mg per tablet, Take 1 tablet by mouth 3 (three) times daily., Disp: , Rfl:     cetirizine (ZYRTEC) 10 MG tablet, Take 1 tablet by mouth Daily. 1 Tablet Oral Every day.  To get over-the-counter, Disp: , Rfl:     memantine (NAMENDA) 10 MG Tab, Take 10 mg by mouth 2 (two) times daily., Disp: , Rfl:     azelastine (ASTELIN) 137 mcg (0.1 %) nasal spray, 1 spray (137 mcg total) by Nasal route 2 (two) times daily. (Patient not taking: Reported on 4/26/2024), Disp: 30 mL, Rfl: 0    benazepriL (LOTENSIN) 20 MG tablet, Take 1 tablet (20 mg total) by mouth once daily. (Patient not taking: Reported on 4/26/2024), Disp: 90 tablet, Rfl: 3    ketoconazole (NIZORAL) 2 % shampoo, Apply topically twice a week. (Patient not taking: Reported on 4/26/2024), Disp: 120 mL, Rfl: 2    lactulose (CHRONULAC) 20 gram/30 mL Soln, Take 15 mLs (10 g total) by mouth 3 (three) times daily as needed. (Patient not taking: Reported on 4/26/2024), Disp: 60 mL, Rfl: 2    metoprolol succinate (TOPROL-XL) 25 MG 24 hr tablet, Take 1 tablet (25 mg total) by mouth once daily., Disp: 30 tablet, Rfl: 11          REVIEW OF SYSTEMS:  Patient has no fever, fatigue, visual changes, chest pain, edema, cough, dyspnea, nausea, vomiting, constipation, diarrhea, arthralgias, pruritis, dizziness, weakness, depression, confusion.     PHYSICAL EXAM:  Blood pressure 102/64, pulse 66, resp. rate 18, height 5' 10" (1.778 m), weight 60.1 kg (132 lb 7.9 oz).  Gen:WDWN male in no apparent distress  Psych:Normal mood and affect  Skin:No rashes or ulcers  Neck:No JVD  Chest:Clear with no rales, rhonchi, wheezing with normal effort  CV:Regular with no murmurs, gallops or rubs  Abd:Soft, nontender, no distension  Ext:No edema     Labs reviewed  BMP  Lab Results   Component Value Date     (H) 04/24/2024    K 3.1 (L) 04/24/2024     04/24/2024    CO2 33 (H) 04/24/2024    BUN 20 04/24/2024    CREATININE 1.5 (H) 04/24/2024    " CALCIUM 9.7 04/24/2024    ANIONGAP 12 04/24/2024    EGFRNORACEVR 49.8 (A) 04/24/2024     Lab Results   Component Value Date    WBC 7.46 04/24/2024    HGB 13.0 (L) 04/24/2024    HCT 38.6 (L) 04/24/2024    MCV 92 04/24/2024     04/24/2024       U Na 52, U Cr 316, FENa 0.2%  U/a: 1+ protein, no blood, 6 RBC's, 12 hyaline casts, noted Ca oxalate crystals  PSA normal         IMPRESSION AND RECOMMENDATIONS:  69 y/o male with s Cr fluctuations presented for f/u:     Renal: DEBRA and s Cr fluctuations secondary to intermittent diarrhea  FENa <1%, c/w prerenal azotemia, c/w diarrhea  Diarrhea due to lactulose  Hypokalemia secondary to GI loss  Hypernatremia secondary to diarrhea  PSA normal     Ca oxalate crystal in urine: reason not clear  No h/o of kidney stones, no food fetishes rich in oxalate, no anti-freeze (ethylene glycol)  Repeat labs (u/a) did not show any.     2. HTN: BP controlled to low   Meds reviewed.  Will lower toprol XL from 50 to 25 mg po qd     3. Constipation: may be secondary to amlodipine  Attempted to d/c amlodipine  But pt went back and re-started it (in combination with benzapril)  Will defer to PCP     Plans and recommendations:  As discussed above  Total time spent 40 minutes including time needed to review the records, the   patient evaluation, documentation, face-to-face discussion with the patient,   more than 50% of the time was spent on coordination of care and counseling.    Level V visit.  RTC 3-4 months     Christopher Dwyer MD

## 2024-04-30 ENCOUNTER — TELEPHONE (OUTPATIENT)
Dept: HEMATOLOGY/ONCOLOGY | Facility: CLINIC | Age: 69
End: 2024-04-30
Payer: COMMERCIAL

## 2024-04-30 NOTE — TELEPHONE ENCOUNTER
----- Message from Cata Barnard sent at 4/30/2024 12:39 PM CDT -----  Regarding: appt  Name of who is calling:   Jose      What is the request in detail: Pt is requesting a call back in ref to rescheduling appt for 05/01/2024 and moved back to 05/08/24  please call asap      Can the clinic reply by [facility]:no      What number to call back if not [facility]: [phone]   Patient placed on a low air loss, Pressure Redistribution mattress and bed as per instructions from Wound ostomy nurse, Melissa Perales.  Patient tolerated the move well

## 2024-05-01 ENCOUNTER — LAB VISIT (OUTPATIENT)
Dept: LAB | Facility: HOSPITAL | Age: 69
End: 2024-05-01
Attending: NURSE PRACTITIONER
Payer: COMMERCIAL

## 2024-05-01 ENCOUNTER — OFFICE VISIT (OUTPATIENT)
Dept: HEMATOLOGY/ONCOLOGY | Facility: CLINIC | Age: 69
End: 2024-05-01
Attending: INTERNAL MEDICINE
Payer: COMMERCIAL

## 2024-05-01 VITALS
SYSTOLIC BLOOD PRESSURE: 170 MMHG | OXYGEN SATURATION: 100 % | TEMPERATURE: 98 F | BODY MASS INDEX: 19.33 KG/M2 | HEART RATE: 67 BPM | DIASTOLIC BLOOD PRESSURE: 82 MMHG | WEIGHT: 135.06 LBS | HEIGHT: 70 IN

## 2024-05-01 DIAGNOSIS — D64.9 ANEMIA, UNSPECIFIED TYPE: ICD-10-CM

## 2024-05-01 DIAGNOSIS — N18.31 CHRONIC KIDNEY DISEASE, STAGE 3A: Primary | ICD-10-CM

## 2024-05-01 LAB
ALBUMIN SERPL BCP-MCNC: 4.1 G/DL (ref 3.5–5.2)
ALP SERPL-CCNC: 86 U/L (ref 55–135)
ALT SERPL W/O P-5'-P-CCNC: 10 U/L (ref 10–44)
ANION GAP SERPL CALC-SCNC: 8 MMOL/L (ref 8–16)
AST SERPL-CCNC: 16 U/L (ref 10–40)
B2 MICROGLOB SERPL-MCNC: 3.7 UG/ML (ref 0–2.5)
BASOPHILS # BLD AUTO: 0.03 K/UL (ref 0–0.2)
BASOPHILS NFR BLD: 0.5 % (ref 0–1.9)
BILIRUB SERPL-MCNC: 0.4 MG/DL (ref 0.1–1)
BUN SERPL-MCNC: 26 MG/DL (ref 8–23)
CALCIUM SERPL-MCNC: 9.7 MG/DL (ref 8.7–10.5)
CHLORIDE SERPL-SCNC: 103 MMOL/L (ref 95–110)
CO2 SERPL-SCNC: 33 MMOL/L (ref 23–29)
CREAT SERPL-MCNC: 1.6 MG/DL (ref 0.5–1.4)
DIFFERENTIAL METHOD BLD: ABNORMAL
EOSINOPHIL # BLD AUTO: 0.2 K/UL (ref 0–0.5)
EOSINOPHIL NFR BLD: 3.4 % (ref 0–8)
ERYTHROCYTE [DISTWIDTH] IN BLOOD BY AUTOMATED COUNT: 11.9 % (ref 11.5–14.5)
EST. GFR  (NO RACE VARIABLE): 46 ML/MIN/1.73 M^2
FERRITIN SERPL-MCNC: 250 NG/ML (ref 20–300)
FOLATE SERPL-MCNC: 17.3 NG/ML (ref 4–24)
GLUCOSE SERPL-MCNC: 93 MG/DL (ref 70–110)
HAPTOGLOB SERPL-MCNC: 268 MG/DL (ref 30–250)
HCT VFR BLD AUTO: 38.1 % (ref 40–54)
HGB BLD-MCNC: 13.1 G/DL (ref 14–18)
IGA SERPL-MCNC: 220 MG/DL (ref 40–350)
IGG SERPL-MCNC: 1125 MG/DL (ref 650–1600)
IGM SERPL-MCNC: 64 MG/DL (ref 50–300)
IMM GRANULOCYTES # BLD AUTO: 0.01 K/UL (ref 0–0.04)
IMM GRANULOCYTES NFR BLD AUTO: 0.2 % (ref 0–0.5)
IRON SERPL-MCNC: 75 UG/DL (ref 45–160)
LDH SERPL L TO P-CCNC: 190 U/L (ref 110–260)
LYMPHOCYTES # BLD AUTO: 1.4 K/UL (ref 1–4.8)
LYMPHOCYTES NFR BLD: 22 % (ref 18–48)
MCH RBC QN AUTO: 31.1 PG (ref 27–31)
MCHC RBC AUTO-ENTMCNC: 34.4 G/DL (ref 32–36)
MCV RBC AUTO: 91 FL (ref 82–98)
MONOCYTES # BLD AUTO: 0.5 K/UL (ref 0.3–1)
MONOCYTES NFR BLD: 7 % (ref 4–15)
NEUTROPHILS # BLD AUTO: 4.3 K/UL (ref 1.8–7.7)
NEUTROPHILS NFR BLD: 66.9 % (ref 38–73)
NRBC BLD-RTO: 0 /100 WBC
PLATELET # BLD AUTO: 266 K/UL (ref 150–450)
PMV BLD AUTO: 9.8 FL (ref 9.2–12.9)
POTASSIUM SERPL-SCNC: 3.2 MMOL/L (ref 3.5–5.1)
PROT SERPL-MCNC: 7.5 G/DL (ref 6–8.4)
RBC # BLD AUTO: 4.21 M/UL (ref 4.6–6.2)
RETICS/RBC NFR AUTO: 0.7 % (ref 0.4–2)
SATURATED IRON: 25 % (ref 20–50)
SODIUM SERPL-SCNC: 144 MMOL/L (ref 136–145)
TOTAL IRON BINDING CAPACITY: 305 UG/DL (ref 250–450)
TRANSFERRIN SERPL-MCNC: 206 MG/DL (ref 200–375)
TSH SERPL DL<=0.005 MIU/L-ACNC: 0.96 UIU/ML (ref 0.4–4)
VIT B12 SERPL-MCNC: 447 PG/ML (ref 210–950)
WBC # BLD AUTO: 6.46 K/UL (ref 3.9–12.7)

## 2024-05-01 PROCEDURE — 85025 COMPLETE CBC W/AUTO DIFF WBC: CPT | Performed by: NURSE PRACTITIONER

## 2024-05-01 PROCEDURE — 3008F BODY MASS INDEX DOCD: CPT | Mod: CPTII,S$GLB,, | Performed by: NURSE PRACTITIONER

## 2024-05-01 PROCEDURE — 84443 ASSAY THYROID STIM HORMONE: CPT | Performed by: NURSE PRACTITIONER

## 2024-05-01 PROCEDURE — 82525 ASSAY OF COPPER: CPT | Performed by: NURSE PRACTITIONER

## 2024-05-01 PROCEDURE — 1100F PTFALLS ASSESS-DOCD GE2>/YR: CPT | Mod: CPTII,S$GLB,, | Performed by: NURSE PRACTITIONER

## 2024-05-01 PROCEDURE — 1159F MED LIST DOCD IN RCRD: CPT | Mod: CPTII,S$GLB,, | Performed by: NURSE PRACTITIONER

## 2024-05-01 PROCEDURE — 83010 ASSAY OF HAPTOGLOBIN QUANT: CPT | Performed by: NURSE PRACTITIONER

## 2024-05-01 PROCEDURE — 86334 IMMUNOFIX E-PHORESIS SERUM: CPT | Mod: 26,,, | Performed by: PATHOLOGY

## 2024-05-01 PROCEDURE — 80053 COMPREHEN METABOLIC PANEL: CPT | Performed by: NURSE PRACTITIONER

## 2024-05-01 PROCEDURE — 1160F RVW MEDS BY RX/DR IN RCRD: CPT | Mod: CPTII,S$GLB,, | Performed by: NURSE PRACTITIONER

## 2024-05-01 PROCEDURE — 83521 IG LIGHT CHAINS FREE EACH: CPT | Mod: 59 | Performed by: NURSE PRACTITIONER

## 2024-05-01 PROCEDURE — 3288F FALL RISK ASSESSMENT DOCD: CPT | Mod: CPTII,S$GLB,, | Performed by: NURSE PRACTITIONER

## 2024-05-01 PROCEDURE — 82728 ASSAY OF FERRITIN: CPT | Mod: 91 | Performed by: NURSE PRACTITIONER

## 2024-05-01 PROCEDURE — 86334 IMMUNOFIX E-PHORESIS SERUM: CPT | Performed by: NURSE PRACTITIONER

## 2024-05-01 PROCEDURE — 99205 OFFICE O/P NEW HI 60 MIN: CPT | Mod: S$GLB,,, | Performed by: NURSE PRACTITIONER

## 2024-05-01 PROCEDURE — 82668 ASSAY OF ERYTHROPOIETIN: CPT | Performed by: NURSE PRACTITIONER

## 2024-05-01 PROCEDURE — 99999 PR PBB SHADOW E&M-EST. PATIENT-LVL IV: CPT | Mod: PBBFAC,,, | Performed by: NURSE PRACTITIONER

## 2024-05-01 PROCEDURE — 82784 ASSAY IGA/IGD/IGG/IGM EACH: CPT | Performed by: NURSE PRACTITIONER

## 2024-05-01 PROCEDURE — 82232 ASSAY OF BETA-2 PROTEIN: CPT | Performed by: NURSE PRACTITIONER

## 2024-05-01 PROCEDURE — 84165 PROTEIN E-PHORESIS SERUM: CPT | Performed by: NURSE PRACTITIONER

## 2024-05-01 PROCEDURE — 84165 PROTEIN E-PHORESIS SERUM: CPT | Mod: 26,,, | Performed by: PATHOLOGY

## 2024-05-01 PROCEDURE — 83540 ASSAY OF IRON: CPT | Performed by: NURSE PRACTITIONER

## 2024-05-01 PROCEDURE — 3077F SYST BP >= 140 MM HG: CPT | Mod: CPTII,S$GLB,, | Performed by: NURSE PRACTITIONER

## 2024-05-01 PROCEDURE — 4010F ACE/ARB THERAPY RXD/TAKEN: CPT | Mod: CPTII,S$GLB,, | Performed by: NURSE PRACTITIONER

## 2024-05-01 PROCEDURE — 83615 LACTATE (LD) (LDH) ENZYME: CPT | Performed by: NURSE PRACTITIONER

## 2024-05-01 PROCEDURE — 85045 AUTOMATED RETICULOCYTE COUNT: CPT | Performed by: NURSE PRACTITIONER

## 2024-05-01 PROCEDURE — 82607 VITAMIN B-12: CPT | Performed by: NURSE PRACTITIONER

## 2024-05-01 PROCEDURE — 3066F NEPHROPATHY DOC TX: CPT | Mod: CPTII,S$GLB,, | Performed by: NURSE PRACTITIONER

## 2024-05-01 PROCEDURE — 83021 HEMOGLOBIN CHROMOTOGRAPHY: CPT | Mod: 91 | Performed by: NURSE PRACTITIONER

## 2024-05-01 PROCEDURE — 1126F AMNT PAIN NOTED NONE PRSNT: CPT | Mod: CPTII,S$GLB,, | Performed by: NURSE PRACTITIONER

## 2024-05-01 PROCEDURE — 3079F DIAST BP 80-89 MM HG: CPT | Mod: CPTII,S$GLB,, | Performed by: NURSE PRACTITIONER

## 2024-05-01 PROCEDURE — 82746 ASSAY OF FOLIC ACID SERUM: CPT | Performed by: NURSE PRACTITIONER

## 2024-05-01 PROCEDURE — 84630 ASSAY OF ZINC: CPT | Performed by: NURSE PRACTITIONER

## 2024-05-01 NOTE — ASSESSMENT & PLAN NOTE
Continue Nephrology follow up. Encourage adequate daily PO hydration. Avoid nephrotoxic medications

## 2024-05-01 NOTE — ASSESSMENT & PLAN NOTE
Anemia evaluation thus far consistent with anemia of CKD. Mild anemia noted onset with decline in kidney function. No prior evidence of nutritional deficiencies. No B symptoms noted. No abnormal bleeding or anemia symptoms noted    Will do more extensive anemia evaluation to r/o other etiology. We discussed if anemia of CKD may be future role for EPO but not with hg > 10. F/u 2 weeks for lab review

## 2024-05-01 NOTE — PROGRESS NOTES
Subjective:       Patient ID: Jose Long is a 68 y.o. male.    Chief Complaint: Referral for anemia    PCP: Harshad Hernandez    HPI: 68 y.o male with Alzheimer's, Parkinson's, CKD III referred to Hematology by PCP for evaluation of anemia.     Review of medical record reveal anemia onset 4/2023 at which time kidney function decline noted    Patient denies B symptoms, abnormal bleeding, or anemia symptoms.     Most recent surveillance Colonoscopy 5/2023 normal, recommended repeat in 5 years given h/o colonic polyps.   Social History     Socioeconomic History    Marital status: Single   Tobacco Use    Smoking status: Former    Smokeless tobacco: Never   Substance and Sexual Activity    Alcohol use: No    Drug use: No     Social Determinants of Health     Financial Resource Strain: Low Risk  (6/19/2019)    Overall Financial Resource Strain (CARDIA)     Difficulty of Paying Living Expenses: Not hard at all   Food Insecurity: No Food Insecurity (6/19/2019)    Hunger Vital Sign     Worried About Running Out of Food in the Last Year: Never true     Ran Out of Food in the Last Year: Never true   Transportation Needs: Unknown (6/19/2019)    PRAPARE - Transportation     Lack of Transportation (Medical): No   Physical Activity: Sufficiently Active (6/19/2019)    Exercise Vital Sign     Days of Exercise per Week: 2 days     Minutes of Exercise per Session: 90 min   Stress: No Stress Concern Present (6/19/2019)    [employer] [place] [employer] - Occupational Stress Questionnaire     Feeling of Stress : Not at all       Past Medical History:   Diagnosis Date    Dyslipidemia     Hypertension     Multiple allergies        Family History   Problem Relation Name Age of Onset    Cataracts Mother      Hypertension Mother      Alzheimer's disease Mother      Diabetes Maternal Aunt      Macular degeneration Maternal Aunt      Diabetes Paternal Grandmother      Colon cancer Father      Melanoma Neg  Hx         Past Surgical History:   Procedure Laterality Date    COLONOSCOPY N/A 6/29/2018    Procedure: COLONOSCOPY;  Surgeon: Kermit Leone MD;  [facility] ENDO;  Service: Endoscopy;  Laterality: N/A;    COLONOSCOPY N/A 5/9/2023    Procedure: COLONOSCOPY;  Surgeon: Jazmin Kraus MD;  Location: [facility] ENDO;  Service: Endoscopy;  Laterality: N/A;       Review of Systems   Reason unable to perform ROS: limited due to Alzheimers.   Constitutional:  Negative for activity change and fatigue.   Respiratory:  Negative for chest tightness and shortness of breath.    Cardiovascular:  Negative for chest pain, palpitations and leg swelling.   Gastrointestinal:  Negative for anal bleeding and blood in stool.   Genitourinary:  Negative for difficulty urinating.   Musculoskeletal:  Positive for gait problem (utilizes walker, h/o parkinson's).         Medication List with Changes/Refills   Current Medications    ACETAMINOPHEN (TYLENOL ARTHRITIS PAIN ORAL)    Take by mouth daily as needed.    AMLODIPINE-BENAZEPRIL 2.5-10 MG (LOTREL) 2.5-10 MG PER CAPSULE    Take 1 capsule by mouth once daily.    AZELASTINE (ASTELIN) 137 MCG (0.1 %) NASAL SPRAY    1 spray (137 mcg total) by Nasal route 2 (two) times daily.    CARBIDOPA-LEVODOPA  MG (SINEMET)  MG PER TABLET    Take 1 tablet by mouth 3 (three) times daily.    CETIRIZINE (ZYRTEC) 10 MG TABLET    Take 1 tablet by mouth Daily. 1 Tablet Oral Every day.  To get over-the-counter    KETOCONAZOLE (NIZORAL) 2 % SHAMPOO    Apply topically twice a week.    LACTULOSE (CHRONULAC) 20 GRAM/30 ML SOLN    Take 15 mLs (10 g total) by mouth 3 (three) times daily as needed.    MEMANTINE (NAMENDA) 10 MG TAB    Take 10 mg by mouth 2 (two) times daily.    METOPROLOL SUCCINATE (TOPROL-XL) 25 MG 24 HR TABLET    Take 1 tablet (25 mg total) by mouth once daily.     Objective:     Vitals:    05/01/24 1355   BP: (!) 170/82   Pulse: 67   Temp: 97.6 °F (36.4 °C)     Lab Results   Component  Value Date    WBC 6.46 05/01/2024    HGB 13.1 (L) 05/01/2024    HCT 38.1 (L) 05/01/2024    MCV 91 05/01/2024     05/01/2024       BMP  Lab Results   Component Value Date     05/01/2024    K 3.2 (L) 05/01/2024     05/01/2024    CO2 33 (H) 05/01/2024    BUN 26 (H) 05/01/2024    CREATININE 1.6 (H) 05/01/2024    CALCIUM 9.7 05/01/2024    ANIONGAP 8 05/01/2024    EGFRNORACEVR 46 (A) 05/01/2024     Lab Results   Component Value Date    IRON 80 02/19/2024      Lab Results   Component Value Date    FOLATE 15.6 02/19/2024     Lab Results   Component Value Date    CZEROFEV88 340 02/19/2024     Lab Results   Component Value Date    ALT 10 05/01/2024    AST 16 05/01/2024    ALKPHOS 86 05/01/2024    BILITOT 0.4 05/01/2024         Physical Exam  Vitals reviewed.   Constitutional:       Appearance: He is well-developed.   HENT:      Head: Normocephalic.      Right Ear: External ear normal.      Left Ear: External ear normal.      Nose: Nose normal.   Eyes:      General: Lids are normal. No scleral icterus.        Right eye: No discharge.         Left eye: No discharge.      Conjunctiva/sclera: Conjunctivae normal.   Neck:      Thyroid: No thyroid mass.   Cardiovascular:      Rate and Rhythm: Normal rate and regular rhythm.      Heart sounds: Normal heart sounds.   Pulmonary:      Effort: Pulmonary effort is normal. No respiratory distress.      Breath sounds: Normal breath sounds. No wheezing or rales.   Abdominal:      General: There is no distension.   Genitourinary:     Comments: deferred  Musculoskeletal:         General: Normal range of motion.      Cervical back: Normal range of motion.   Lymphadenopathy:      Head:      Right side of head: No submandibular, preauricular or posterior auricular adenopathy.      Left side of head: No submandibular, preauricular or posterior auricular adenopathy.   Skin:     General: Skin is warm and dry.   Neurological:      Mental Status: He is alert and oriented to person,  place, and time.   Psychiatric:         Speech: Speech normal.         Behavior: Behavior normal. Behavior is cooperative.         Thought Content: Thought content normal.        Assessment:     Problem List Items Addressed This Visit          Renal/    Chronic kidney disease, stage 3a - Primary     Continue Nephrology follow up. Encourage adequate daily PO hydration. Avoid nephrotoxic medications             Oncology    Anemia     Anemia evaluation thus far consistent with anemia of CKD. Mild anemia noted onset with decline in kidney function. No prior evidence of nutritional deficiencies. No B symptoms noted. No abnormal bleeding or anemia symptoms noted    Will do more extensive anemia evaluation to r/o other etiology. We discussed if anemia of CKD may be future role for EPO but not with hg > 10. F/u 2 weeks for lab review         Relevant Orders    Beta 2 Microglobulin, Serum    CBC Auto Differential (Completed)    Comprehensive Metabolic Panel (Completed)    Copper, Serum    Erythropoietin    Ferritin    Folate    Haptoglobin    Immunofixation Electrophoresis    Immunoglobulin Free LT Chains Blood    Immunoglobulins (IgG, IgA, IgM) Quantitative    Iron and TIBC    Lactate Dehydrogenase (Completed)    Protein Electrophoresis, Serum    Reticulocytes (Completed)    Thalasseima and Hemoglobinopathy Eval    Zinc    Vitamin B12    TSH         Plan:     Chronic kidney disease, stage 3a    Anemia, unspecified type  -     Ambulatory referral/consult to Hematology / Oncology  -     Beta 2 Microglobulin, Serum; Future; Expected date: 05/01/2024  -     CBC Auto Differential; Future; Expected date: 05/01/2024  -     Comprehensive Metabolic Panel; Future; Expected date: 05/01/2024  -     Copper, Serum; Future; Expected date: 05/01/2024  -     Erythropoietin; Future; Expected date: 05/01/2024  -     Ferritin; Future; Expected date: 05/01/2024  -     Folate; Future; Expected date: 05/01/2024  -     Haptoglobin; Future;  Expected date: 05/01/2024  -     Immunofixation Electrophoresis; Future; Expected date: 05/01/2024  -     Immunoglobulin Free LT Chains Blood; Future; Expected date: 05/01/2024  -     Immunoglobulins (IgG, IgA, IgM) Quantitative; Future; Expected date: 05/01/2024  -     Iron and TIBC; Future; Expected date: 05/01/2024  -     Lactate Dehydrogenase; Future; Expected date: 05/01/2024  -     Protein Electrophoresis, Serum; Future; Expected date: 05/01/2024  -     Reticulocytes; Future; Expected date: 05/01/2024  -     Thalasseima and Hemoglobinopathy Eval; Future; Expected date: 05/01/2024  -     Zinc; Future; Expected date: 05/01/2024  -     Vitamin B12; Future; Expected date: 05/01/2024  -     TSH; Future; Expected date: 05/01/2024            Med Onc Chart Routing      Follow up with physician    Follow up with [facility] 2 weeks. Ty anemia eval review   Infusion scheduling note    Injection scheduling note    Labs None   Scheduling:  Preferred lab:  Lab interval:     Imaging None      Pharmacy appointment No pharmacy appointment needed      Other referrals       No additional referrals needed       JUVENAL Dumont

## 2024-05-02 LAB
ALBUMIN SERPL ELPH-MCNC: 4.04 G/DL (ref 3.35–5.55)
ALPHA1 GLOB SERPL ELPH-MCNC: 0.3 G/DL (ref 0.17–0.41)
ALPHA2 GLOB SERPL ELPH-MCNC: 0.84 G/DL (ref 0.43–0.99)
B-GLOBULIN SERPL ELPH-MCNC: 0.72 G/DL (ref 0.5–1.1)
GAMMA GLOB SERPL ELPH-MCNC: 1 G/DL (ref 0.67–1.58)
INTERPRETATION SERPL IFE-IMP: NORMAL
KAPPA LC SER QL IA: 3.41 MG/DL (ref 0.33–1.94)
KAPPA LC/LAMBDA SER IA: 1.76 (ref 0.26–1.65)
LAMBDA LC SER QL IA: 1.94 MG/DL (ref 0.57–2.63)
PATHOLOGIST INTERPRETATION IFE: NORMAL
PATHOLOGIST INTERPRETATION SPE: NORMAL
PROT SERPL-MCNC: 6.9 G/DL (ref 6–8.4)

## 2024-05-03 LAB
FERRITIN SERPL-MCNC: 281 MCG/L (ref 31–409)
HGB A MFR BLD ELPH: 97.6 % (ref 95.8–98)
HGB A2 MFR BLD: 2.4 % (ref 2–3.3)
HGB A2+XXX MFR BLD ELPH: NORMAL %
HGB F MFR BLD: 0 % (ref 0–0.9)
HGB XXX MFR BLD ELPH: NORMAL %
HPLC HB VARIANT: NORMAL
PATH REV BLD -IMP: NORMAL
PROVIDER SIGNING NAME: NORMAL

## 2024-05-05 NOTE — TELEPHONE ENCOUNTER
No care due was identified.  [facility] Embedded Care Due Messages. Reference number: [id number].   5/05/2024 12:25:47 PM CDT

## 2024-05-06 LAB
EPO SERPL-ACNC: 10.5 MIU/ML (ref 2.6–18.5)
ZINC SERPL-MCNC: 80 UG/DL (ref 60–130)

## 2024-05-06 RX ORDER — AMLODIPINE AND BENAZEPRIL HYDROCHLORIDE 10; 20 MG/1; MG/1
1 CAPSULE ORAL DAILY
Qty: 90 CAPSULE | Refills: 0 | OUTPATIENT
Start: 2024-05-06

## 2024-05-06 NOTE — TELEPHONE ENCOUNTER
Refill Decision Note   Jose Long  is requesting a refill authorization.  Brief Assessment and Rationale for Refill:  Quick Discontinue     Medication Therapy Plan:  The original prescription was discontinued on 3/6/2024 by Christopher Dwyer MD for the following reason: Alternate therapy.      Comments:     Note composed:3:14 AM 05/06/2024

## 2024-05-07 LAB — COPPER SERPL-MCNC: 850 UG/L (ref 665–1480)

## 2024-05-10 RX ORDER — AMLODIPINE AND BENAZEPRIL HYDROCHLORIDE 10; 20 MG/1; MG/1
1 CAPSULE ORAL DAILY
Qty: 90 CAPSULE | Refills: 0 | OUTPATIENT
Start: 2024-05-10

## 2024-05-10 NOTE — TELEPHONE ENCOUNTER
No care due was identified.  [facility] Embedded Care Due Messages. Reference number: [id number].   5/10/2024 1:07:28 PM CDT

## 2024-05-10 NOTE — TELEPHONE ENCOUNTER
Refill Decision Note  Mohsen DC. Inappropriate Request     Jose Long  is requesting a refill authorization.  Brief Assessment and Rationale for Refill:  Quick Discontinue     Medication Therapy Plan:  prescription was discontinued on 3/6/2024 by Christopher Dwyer MD for the following reason: Alternate therapy.2.5-10 mg    Medication Reconciliation Completed: No   Comments:           Note composed:2:39 PM 05/10/2024

## 2024-05-11 ENCOUNTER — PATIENT MESSAGE (OUTPATIENT)
Dept: FAMILY MEDICINE | Facility: CLINIC | Age: 69
End: 2024-05-11
Payer: COMMERCIAL

## 2024-05-15 RX ORDER — AMLODIPINE AND BENAZEPRIL HYDROCHLORIDE 10; 20 MG/1; MG/1
1 CAPSULE ORAL DAILY
Qty: 90 CAPSULE | Refills: 3 | Status: SHIPPED | OUTPATIENT
Start: 2024-05-15 | End: 2025-05-15

## 2024-05-27 ENCOUNTER — OFFICE VISIT (OUTPATIENT)
Dept: OPHTHALMOLOGY | Facility: CLINIC | Age: 69
End: 2024-05-27
Payer: COMMERCIAL

## 2024-05-27 ENCOUNTER — PATIENT MESSAGE (OUTPATIENT)
Dept: OPHTHALMOLOGY | Facility: CLINIC | Age: 69
End: 2024-05-27

## 2024-05-27 DIAGNOSIS — H04.129 DRY EYE: ICD-10-CM

## 2024-05-27 DIAGNOSIS — H25.13 NUCLEAR SCLEROSIS, BILATERAL: Primary | ICD-10-CM

## 2024-05-27 DIAGNOSIS — H52.221 REGULAR ASTIGMATISM OF RIGHT EYE: ICD-10-CM

## 2024-05-27 DIAGNOSIS — H52.4 PRESBYOPIA: ICD-10-CM

## 2024-05-27 PROCEDURE — 92015 DETERMINE REFRACTIVE STATE: CPT | Mod: S$GLB,,, | Performed by: OPTOMETRIST

## 2024-05-27 PROCEDURE — 99999 PR PBB SHADOW E&M-EST. PATIENT-LVL III: CPT | Mod: PBBFAC,,, | Performed by: OPTOMETRIST

## 2024-05-27 PROCEDURE — 92014 COMPRE OPH EXAM EST PT 1/>: CPT | Mod: S$GLB,,, | Performed by: OPTOMETRIST

## 2024-05-27 PROCEDURE — 1160F RVW MEDS BY RX/DR IN RCRD: CPT | Mod: CPTII,S$GLB,, | Performed by: OPTOMETRIST

## 2024-05-27 PROCEDURE — 3066F NEPHROPATHY DOC TX: CPT | Mod: CPTII,S$GLB,, | Performed by: OPTOMETRIST

## 2024-05-27 PROCEDURE — 4010F ACE/ARB THERAPY RXD/TAKEN: CPT | Mod: CPTII,S$GLB,, | Performed by: OPTOMETRIST

## 2024-05-27 PROCEDURE — 1159F MED LIST DOCD IN RCRD: CPT | Mod: CPTII,S$GLB,, | Performed by: OPTOMETRIST

## 2024-05-27 NOTE — PROGRESS NOTES
HPI     Annual Exam            Comments: Pt co glare from cataracts   Pt uses otc gtts prn ou, doesn't seem to help with burning  No itching ou   Pt co same floaters on and off           Comments    Ocular Migraines  Presbyopia  Myopia  Nuclear sclerosis             Last edited by Edis Thomas on 5/27/2024  2:28 PM.            Assessment /Plan     For exam results, see Encounter Report.    Nuclear sclerosis, bilateral  Cataracts not significantly affecting activities of daily living and therefore surgery is not indicated at this time.   Will continue to monitor over the next 12 months. Pt to call or RTC with any significant change in vision prior to next visit.     Dry eye  Recommended artificial tears bid OU  Ivizia coupon given to pt  Discussed rx options if symptoms persist or worsen  Pt to call or RTC    Presbyopia  Regular astigmatism of right eye  Eyeglass Final Rx       Eyeglass Final Rx         Sphere Cylinder Axis    Right -0.50 +0.25 020    Left +0.50        Expiration Date: 5/27/2025   Signs are correct                 Normal gOCT today with nice, symmetric GCL OU    RTC 1 yr for dilated eye exam or PRN if any problems.   Discussed above and answered questions.

## 2024-05-31 ENCOUNTER — OFFICE VISIT (OUTPATIENT)
Dept: HEMATOLOGY/ONCOLOGY | Facility: CLINIC | Age: 69
End: 2024-05-31
Payer: COMMERCIAL

## 2024-05-31 VITALS
SYSTOLIC BLOOD PRESSURE: 136 MMHG | HEIGHT: 70 IN | WEIGHT: 136.81 LBS | HEART RATE: 73 BPM | DIASTOLIC BLOOD PRESSURE: 65 MMHG | OXYGEN SATURATION: 98 % | TEMPERATURE: 98 F | BODY MASS INDEX: 19.59 KG/M2

## 2024-05-31 DIAGNOSIS — D64.9 ANEMIA, UNSPECIFIED TYPE: ICD-10-CM

## 2024-05-31 DIAGNOSIS — N18.31 CHRONIC KIDNEY DISEASE, STAGE 3A: Primary | ICD-10-CM

## 2024-05-31 PROCEDURE — 1100F PTFALLS ASSESS-DOCD GE2>/YR: CPT | Mod: CPTII,S$GLB,, | Performed by: NURSE PRACTITIONER

## 2024-05-31 PROCEDURE — 99999 PR PBB SHADOW E&M-EST. PATIENT-LVL III: CPT | Mod: PBBFAC,,, | Performed by: NURSE PRACTITIONER

## 2024-05-31 PROCEDURE — 1126F AMNT PAIN NOTED NONE PRSNT: CPT | Mod: CPTII,S$GLB,, | Performed by: NURSE PRACTITIONER

## 2024-05-31 PROCEDURE — 99214 OFFICE O/P EST MOD 30 MIN: CPT | Mod: S$GLB,,, | Performed by: NURSE PRACTITIONER

## 2024-05-31 PROCEDURE — 3288F FALL RISK ASSESSMENT DOCD: CPT | Mod: CPTII,S$GLB,, | Performed by: NURSE PRACTITIONER

## 2024-05-31 PROCEDURE — 3075F SYST BP GE 130 - 139MM HG: CPT | Mod: CPTII,S$GLB,, | Performed by: NURSE PRACTITIONER

## 2024-05-31 PROCEDURE — 3066F NEPHROPATHY DOC TX: CPT | Mod: CPTII,S$GLB,, | Performed by: NURSE PRACTITIONER

## 2024-05-31 PROCEDURE — 3078F DIAST BP <80 MM HG: CPT | Mod: CPTII,S$GLB,, | Performed by: NURSE PRACTITIONER

## 2024-05-31 PROCEDURE — 1159F MED LIST DOCD IN RCRD: CPT | Mod: CPTII,S$GLB,, | Performed by: NURSE PRACTITIONER

## 2024-05-31 PROCEDURE — 1160F RVW MEDS BY RX/DR IN RCRD: CPT | Mod: CPTII,S$GLB,, | Performed by: NURSE PRACTITIONER

## 2024-05-31 PROCEDURE — 4010F ACE/ARB THERAPY RXD/TAKEN: CPT | Mod: CPTII,S$GLB,, | Performed by: NURSE PRACTITIONER

## 2024-05-31 PROCEDURE — 3008F BODY MASS INDEX DOCD: CPT | Mod: CPTII,S$GLB,, | Performed by: NURSE PRACTITIONER

## 2024-05-31 NOTE — ASSESSMENT & PLAN NOTE
Anemia evaluation thus far consistent with anemia of CKD. Mild anemia noted onset with decline in kidney function. No prior evidence of nutritional deficiencies. No B symptoms noted. No abnormal bleeding or anemia symptoms noted    Anemia evaluation significant for elevated kappa free light chain with slightly elevated kappa/lambda ratio. No paraproteinemia. No hemolysis. Discussed recommendations for 24 hour urine electrophoresis/ZARI however given dementia diagnosis would likely be difficult to obtain. For now the patient and his caregiver would like to hold off on 24 hour urine collection. Given stability of anemia and essentially unremarkable workup will defer further evaluation for now.     F/u 3 months with repeat labs.

## 2024-05-31 NOTE — PROGRESS NOTES
Subjective:       Patient ID: Jose Long is a 68 y.o. male.    Chief Complaint: Anemia workup review     PCP: Harshad Hernandez    HPI: 68 y.o male with Alzheimer's, Parkinson's, CKD III referred to Hematology by PCP for evaluation of anemia.     Review of medical record reveal anemia onset 4/2023 at which time kidney function decline noted    Patient denies B symptoms, abnormal bleeding, or anemia symptoms.     Most recent surveillance Colonoscopy 5/2023 normal, recommended repeat in 5 years given h/o colonic polyps.     Patient presenting today for anemia evaluation review. Denies interval clinical concerns.  Social History     Socioeconomic History    Marital status: Single   Tobacco Use    Smoking status: Former    Smokeless tobacco: Never   Substance and Sexual Activity    Alcohol use: No    Drug use: No     Social Determinants of Health     Financial Resource Strain: Low Risk  (6/19/2019)    Overall Financial Resource Strain (CARDIA)     Difficulty of Paying Living Expenses: Not hard at all   Food Insecurity: No Food Insecurity (6/19/2019)    Hunger Vital Sign     Worried About Running Out of Food in the Last Year: Never true     Ran Out of Food in the Last Year: Never true   Transportation Needs: Unknown (6/19/2019)    PRAPARE - Transportation     Lack of Transportation (Medical): No   Physical Activity: Sufficiently Active (6/19/2019)    Exercise Vital Sign     Days of Exercise per Week: 2 days     Minutes of Exercise per Session: 90 min   Stress: No Stress Concern Present (6/19/2019)    [employer] [place] [employer] - Occupational Stress Questionnaire     Feeling of Stress : Not at all       Past Medical History:   Diagnosis Date    Dyslipidemia     Hypertension     Multiple allergies        Family History   Problem Relation Name Age of Onset    Cataracts Mother      Hypertension Mother      Alzheimer's disease Mother      Diabetes Maternal Aunt      Macular degeneration Maternal Aunt       Diabetes Paternal Grandmother      Colon cancer Father      Melanoma Neg Hx         Past Surgical History:   Procedure Laterality Date    COLONOSCOPY N/A 6/29/2018    Procedure: COLONOSCOPY;  Surgeon: Kermit Leone MD;  Location: [facility] ENDO;  Service: Endoscopy;  Laterality: N/A;    COLONOSCOPY N/A 5/9/2023    Procedure: COLONOSCOPY;  Surgeon: Jazmin Kraus MD;  Location: [facility] ENDO;  Service: Endoscopy;  Laterality: N/A;       Review of Systems   Reason unable to perform ROS: limited due to Alzheimers.   Constitutional:  Negative for activity change and fatigue.   Respiratory:  Negative for chest tightness and shortness of breath.    Cardiovascular:  Negative for chest pain, palpitations and leg swelling.   Gastrointestinal:  Negative for anal bleeding and blood in stool.   Genitourinary:  Negative for difficulty urinating.   Musculoskeletal:  Positive for gait problem (utilizes walker, h/o parkinson's).         Medication List with Changes/Refills   Current Medications    ACETAMINOPHEN (TYLENOL ARTHRITIS PAIN ORAL)    Take by mouth daily as needed.    AMLODIPINE-BENAZEPRIL 10-20MG (LOTREL) 10-20 MG PER CAPSULE    Take 1 capsule by mouth once daily.    AZELASTINE (ASTELIN) 137 MCG (0.1 %) NASAL SPRAY    1 spray (137 mcg total) by Nasal route 2 (two) times daily.    CARBIDOPA-LEVODOPA  MG (SINEMET)  MG PER TABLET    Take 1 tablet by mouth 3 (three) times daily.    CETIRIZINE (ZYRTEC) 10 MG TABLET    Take 1 tablet by mouth Daily. 1 Tablet Oral Every day.  To get over-the-counter    KETOCONAZOLE (NIZORAL) 2 % SHAMPOO    Apply topically twice a week.    LACTULOSE (CHRONULAC) 20 GRAM/30 ML SOLN    Take 15 mLs (10 g total) by mouth 3 (three) times daily as needed.    MEMANTINE (NAMENDA) 10 MG TAB    Take 10 mg by mouth 2 (two) times daily.     Objective:     Vitals:    05/31/24 1114   BP: 136/65   Pulse: 73   Temp: 98.3 °F (36.8 °C)     Lab Results   Component Value Date    WBC 6.46 05/01/2024    HGB 13.1  (L) 05/01/2024    HCT 38.1 (L) 05/01/2024    MCV 91 05/01/2024     05/01/2024       BMP  Lab Results   Component Value Date     05/01/2024    K 3.2 (L) 05/01/2024     05/01/2024    CO2 33 (H) 05/01/2024    BUN 26 (H) 05/01/2024    CREATININE 1.6 (H) 05/01/2024    CALCIUM 9.7 05/01/2024    ANIONGAP 8 05/01/2024    EGFRNORACEVR 46 (A) 05/01/2024     Lab Results   Component Value Date    IRON 75 05/01/2024    TRANSFERRIN 206 05/01/2024    TIBC 305 05/01/2024    FESATURATED 25 05/01/2024      Lab Results   Component Value Date    FOLATE 17.3 05/01/2024     Lab Results   Component Value Date    BXOFQQVM64 447 05/01/2024     Lab Results   Component Value Date    ALT 10 05/01/2024    AST 16 05/01/2024    ALKPHOS 86 05/01/2024    BILITOT 0.4 05/01/2024         Physical Exam  Vitals reviewed.   Constitutional:       Appearance: He is well-developed.   HENT:      Head: Normocephalic.      Right Ear: External ear normal.      Left Ear: External ear normal.      Nose: Nose normal.   Eyes:      General: Lids are normal. No scleral icterus.        Right eye: No discharge.         Left eye: No discharge.      Conjunctiva/sclera: Conjunctivae normal.   Neck:      Thyroid: No thyroid mass.   Cardiovascular:      Rate and Rhythm: Normal rate and regular rhythm.      Heart sounds: Normal heart sounds.   Pulmonary:      Effort: Pulmonary effort is normal. No respiratory distress.      Breath sounds: Normal breath sounds. No wheezing or rales.   Abdominal:      General: There is no distension.   Genitourinary:     Comments: deferred  Musculoskeletal:         General: Normal range of motion.      Cervical back: Normal range of motion.   Lymphadenopathy:      Head:      Right side of head: No submandibular, preauricular or posterior auricular adenopathy.      Left side of head: No submandibular, preauricular or posterior auricular adenopathy.   Skin:     General: Skin is warm and dry.   Neurological:      Mental Status:  He is alert and oriented to person, place, and time.   Psychiatric:         Speech: Speech normal.         Behavior: Behavior normal. Behavior is cooperative.         Thought Content: Thought content normal.        Assessment:     Problem List Items Addressed This Visit          Renal/    Chronic kidney disease, stage 3a - Primary     Continue Nephrology follow up. Encourage adequate daily PO hydration. Avoid nephrotoxic medications          Relevant Orders    CBC Auto Differential    Comprehensive Metabolic Panel    Beta 2 Microglobulin, Serum    Immunoglobulin Free LT Chains Blood       Oncology    Anemia     Anemia evaluation thus far consistent with anemia of CKD. Mild anemia noted onset with decline in kidney function. No prior evidence of nutritional deficiencies. No B symptoms noted. No abnormal bleeding or anemia symptoms noted    Anemia evaluation significant for elevated kappa free light chain with slightly elevated kappa/lambda ratio. No paraproteinemia. No hemolysis. Discussed recommendations for 24 hour urine electrophoresis/ZARI however given dementia diagnosis would likely be difficult to obtain. For now the patient and his caregiver would like to hold off on 24 hour urine collection. Given stability of anemia and essentially unremarkable workup will defer further evaluation for now.     F/u 3 months with repeat labs.         Relevant Orders    CBC Auto Differential    Comprehensive Metabolic Panel    Beta 2 Microglobulin, Serum    Immunoglobulin Free LT Chains Blood         Plan:     Chronic kidney disease, stage 3a  -     CBC Auto Differential; Future; Expected date: 05/31/2024  -     Comprehensive Metabolic Panel; Future; Expected date: 05/31/2024  -     Beta 2 Microglobulin, Serum; Future; Expected date: 05/31/2024  -     Immunoglobulin Free LT Chains Blood; Future; Expected date: 05/31/2024    Anemia, unspecified type  -     CBC Auto Differential; Future; Expected date: 05/31/2024  -      Comprehensive Metabolic Panel; Future; Expected date: 05/31/2024  -     Beta 2 Microglobulin, Serum; Future; Expected date: 05/31/2024  -     Immunoglobulin Free LT Chains Blood; Future; Expected date: 05/31/2024            Med Onc Chart Routing      Follow up with physician    Follow up with CASIE 3 months.   Infusion scheduling note    Injection scheduling note    Labs CBC, CMP, beta 2 microglobulin and free light chains   Scheduling:  Preferred lab:  Lab interval:  few days prior   Imaging None      Pharmacy appointment No pharmacy appointment needed      Other referrals       No additional referrals needed       JUVENAL Dumont

## 2024-07-18 ENCOUNTER — PATIENT MESSAGE (OUTPATIENT)
Dept: OPHTHALMOLOGY | Facility: CLINIC | Age: 69
End: 2024-07-18
Payer: COMMERCIAL

## 2024-08-13 ENCOUNTER — LAB VISIT (OUTPATIENT)
Dept: LAB | Facility: HOSPITAL | Age: 69
End: 2024-08-13
Attending: INTERNAL MEDICINE
Payer: COMMERCIAL

## 2024-08-13 ENCOUNTER — OFFICE VISIT (OUTPATIENT)
Dept: FAMILY MEDICINE | Facility: CLINIC | Age: 69
End: 2024-08-13
Payer: COMMERCIAL

## 2024-08-13 VITALS
BODY MASS INDEX: 18.37 KG/M2 | DIASTOLIC BLOOD PRESSURE: 80 MMHG | WEIGHT: 128.31 LBS | HEART RATE: 86 BPM | SYSTOLIC BLOOD PRESSURE: 138 MMHG | OXYGEN SATURATION: 98 % | RESPIRATION RATE: 18 BRPM | HEIGHT: 70 IN | TEMPERATURE: 98 F

## 2024-08-13 DIAGNOSIS — N18.31 CHRONIC KIDNEY DISEASE, STAGE 3A: ICD-10-CM

## 2024-08-13 DIAGNOSIS — D64.9 ANEMIA, UNSPECIFIED TYPE: ICD-10-CM

## 2024-08-13 DIAGNOSIS — G30.9 ALZHEIMER'S DEMENTIA WITHOUT BEHAVIORAL DISTURBANCE, PSYCHOTIC DISTURBANCE, MOOD DISTURBANCE, OR ANXIETY, UNSPECIFIED DEMENTIA SEVERITY, UNSPECIFIED TIMING OF DEMENTIA ONSET: Primary | ICD-10-CM

## 2024-08-13 DIAGNOSIS — E78.5 DYSLIPIDEMIA: ICD-10-CM

## 2024-08-13 DIAGNOSIS — F02.80 ALZHEIMER'S DEMENTIA WITHOUT BEHAVIORAL DISTURBANCE, PSYCHOTIC DISTURBANCE, MOOD DISTURBANCE, OR ANXIETY, UNSPECIFIED DEMENTIA SEVERITY, UNSPECIFIED TIMING OF DEMENTIA ONSET: Primary | ICD-10-CM

## 2024-08-13 DIAGNOSIS — I10 PRIMARY HYPERTENSION: Chronic | ICD-10-CM

## 2024-08-13 DIAGNOSIS — Z12.5 ENCOUNTER FOR PROSTATE CANCER SCREENING: ICD-10-CM

## 2024-08-13 DIAGNOSIS — G20.A1 PARKINSON'S DISEASE, UNSPECIFIED WHETHER DYSKINESIA PRESENT, UNSPECIFIED WHETHER MANIFESTATIONS FLUCTUATE: ICD-10-CM

## 2024-08-13 LAB
ALBUMIN SERPL BCP-MCNC: 4.4 G/DL (ref 3.5–5.2)
ALP SERPL-CCNC: 56 U/L (ref 55–135)
ALT SERPL W/O P-5'-P-CCNC: 14 U/L (ref 10–44)
ANION GAP SERPL CALC-SCNC: 14 MMOL/L (ref 8–16)
AST SERPL-CCNC: 24 U/L (ref 10–40)
BASOPHILS # BLD AUTO: 0.04 K/UL (ref 0–0.2)
BASOPHILS NFR BLD: 0.7 % (ref 0–1.9)
BILIRUB SERPL-MCNC: 0.7 MG/DL (ref 0.1–1)
BUN SERPL-MCNC: 23 MG/DL (ref 8–23)
CALCIUM SERPL-MCNC: 9.5 MG/DL (ref 8.7–10.5)
CHLORIDE SERPL-SCNC: 104 MMOL/L (ref 95–110)
CHOLEST SERPL-MCNC: 214 MG/DL (ref 120–199)
CHOLEST/HDLC SERPL: 3 {RATIO} (ref 2–5)
CO2 SERPL-SCNC: 29 MMOL/L (ref 23–29)
COMPLEXED PSA SERPL-MCNC: 2.8 NG/ML (ref 0–4)
CREAT SERPL-MCNC: 1.6 MG/DL (ref 0.5–1.4)
DIFFERENTIAL METHOD BLD: ABNORMAL
EOSINOPHIL # BLD AUTO: 0.1 K/UL (ref 0–0.5)
EOSINOPHIL NFR BLD: 1.7 % (ref 0–8)
ERYTHROCYTE [DISTWIDTH] IN BLOOD BY AUTOMATED COUNT: 11.9 % (ref 11.5–14.5)
EST. GFR  (NO RACE VARIABLE): 46 ML/MIN/1.73 M^2
GLUCOSE SERPL-MCNC: 95 MG/DL (ref 70–110)
HCT VFR BLD AUTO: 37 % (ref 40–54)
HDLC SERPL-MCNC: 71 MG/DL (ref 40–75)
HDLC SERPL: 33.2 % (ref 20–50)
HGB BLD-MCNC: 12.5 G/DL (ref 14–18)
IMM GRANULOCYTES # BLD AUTO: 0.01 K/UL (ref 0–0.04)
IMM GRANULOCYTES NFR BLD AUTO: 0.2 % (ref 0–0.5)
LDLC SERPL CALC-MCNC: 127.8 MG/DL (ref 63–159)
LYMPHOCYTES # BLD AUTO: 1.2 K/UL (ref 1–4.8)
LYMPHOCYTES NFR BLD: 21.2 % (ref 18–48)
MCH RBC QN AUTO: 30.7 PG (ref 27–31)
MCHC RBC AUTO-ENTMCNC: 33.8 G/DL (ref 32–36)
MCV RBC AUTO: 91 FL (ref 82–98)
MONOCYTES # BLD AUTO: 0.4 K/UL (ref 0.3–1)
MONOCYTES NFR BLD: 6.1 % (ref 4–15)
NEUTROPHILS # BLD AUTO: 4 K/UL (ref 1.8–7.7)
NEUTROPHILS NFR BLD: 70.1 % (ref 38–73)
NONHDLC SERPL-MCNC: 143 MG/DL
NRBC BLD-RTO: 0 /100 WBC
PLATELET # BLD AUTO: 242 K/UL (ref 150–450)
PMV BLD AUTO: 10.7 FL (ref 9.2–12.9)
POTASSIUM SERPL-SCNC: 3.4 MMOL/L (ref 3.5–5.1)
PROT SERPL-MCNC: 7.7 G/DL (ref 6–8.4)
RBC # BLD AUTO: 4.07 M/UL (ref 4.6–6.2)
SODIUM SERPL-SCNC: 147 MMOL/L (ref 136–145)
TRIGL SERPL-MCNC: 76 MG/DL (ref 30–150)
WBC # BLD AUTO: 5.72 K/UL (ref 3.9–12.7)

## 2024-08-13 PROCEDURE — 80061 LIPID PANEL: CPT | Performed by: INTERNAL MEDICINE

## 2024-08-13 PROCEDURE — 1159F MED LIST DOCD IN RCRD: CPT | Mod: CPTII,S$GLB,, | Performed by: INTERNAL MEDICINE

## 2024-08-13 PROCEDURE — 3288F FALL RISK ASSESSMENT DOCD: CPT | Mod: CPTII,S$GLB,, | Performed by: INTERNAL MEDICINE

## 2024-08-13 PROCEDURE — 4010F ACE/ARB THERAPY RXD/TAKEN: CPT | Mod: CPTII,S$GLB,, | Performed by: INTERNAL MEDICINE

## 2024-08-13 PROCEDURE — 83521 IG LIGHT CHAINS FREE EACH: CPT | Mod: 59 | Performed by: NURSE PRACTITIONER

## 2024-08-13 PROCEDURE — 36415 COLL VENOUS BLD VENIPUNCTURE: CPT | Mod: PO | Performed by: NURSE PRACTITIONER

## 2024-08-13 PROCEDURE — 1100F PTFALLS ASSESS-DOCD GE2>/YR: CPT | Mod: CPTII,S$GLB,, | Performed by: INTERNAL MEDICINE

## 2024-08-13 PROCEDURE — 99397 PER PM REEVAL EST PAT 65+ YR: CPT | Mod: S$GLB,,, | Performed by: INTERNAL MEDICINE

## 2024-08-13 PROCEDURE — 99999 PR PBB SHADOW E&M-EST. PATIENT-LVL IV: CPT | Mod: PBBFAC,,, | Performed by: INTERNAL MEDICINE

## 2024-08-13 PROCEDURE — 3075F SYST BP GE 130 - 139MM HG: CPT | Mod: CPTII,S$GLB,, | Performed by: INTERNAL MEDICINE

## 2024-08-13 PROCEDURE — 84153 ASSAY OF PSA TOTAL: CPT | Performed by: INTERNAL MEDICINE

## 2024-08-13 PROCEDURE — 82232 ASSAY OF BETA-2 PROTEIN: CPT | Performed by: NURSE PRACTITIONER

## 2024-08-13 PROCEDURE — 85025 COMPLETE CBC W/AUTO DIFF WBC: CPT | Performed by: NURSE PRACTITIONER

## 2024-08-13 PROCEDURE — 3079F DIAST BP 80-89 MM HG: CPT | Mod: CPTII,S$GLB,, | Performed by: INTERNAL MEDICINE

## 2024-08-13 PROCEDURE — 3066F NEPHROPATHY DOC TX: CPT | Mod: CPTII,S$GLB,, | Performed by: INTERNAL MEDICINE

## 2024-08-13 PROCEDURE — 80053 COMPREHEN METABOLIC PANEL: CPT | Performed by: NURSE PRACTITIONER

## 2024-08-13 PROCEDURE — 1126F AMNT PAIN NOTED NONE PRSNT: CPT | Mod: CPTII,S$GLB,, | Performed by: INTERNAL MEDICINE

## 2024-08-13 PROCEDURE — 3008F BODY MASS INDEX DOCD: CPT | Mod: CPTII,S$GLB,, | Performed by: INTERNAL MEDICINE

## 2024-08-13 NOTE — PROGRESS NOTES
Subjective:       Patient ID: Jose Long is a 68 y.o. male.    Chief Complaint: Follow-up, Hypertension, Hyperlipidemia, Anemia, Chronic Kidney Disease, and Dementia    Follow-up  Associated symptoms include arthralgias. Pertinent negatives include no abdominal pain, chest pain, chills, coughing, diaphoresis, fatigue, fever, headaches, joint swelling, myalgias, nausea, neck pain, numbness, rash, sore throat, vomiting or weakness.   Hypertension  Pertinent negatives include no chest pain, headaches, neck pain, palpitations or shortness of breath.   Hyperlipidemia  Pertinent negatives include no chest pain, myalgias or shortness of breath.   Anemia  Symptoms include confusion. There has been no abdominal pain, bruising/bleeding easily, fever, light-headedness, pallor or palpitations.     Past Medical History:   Diagnosis Date    Dyslipidemia     Hypertension     Multiple allergies      Past Surgical History:   Procedure Laterality Date    COLONOSCOPY N/A 6/29/2018    Procedure: COLONOSCOPY;  Surgeon: Kermit Leone MD;  Location: [facility] ENDO;  Service: Endoscopy;  Laterality: N/A;    COLONOSCOPY N/A 5/9/2023    Procedure: COLONOSCOPY;  Surgeon: Jazmin Kraus MD;  Location: [facility] ENDO;  Service: Endoscopy;  Laterality: N/A;     Family History   Problem Relation Name Age of Onset    Cataracts Mother      Hypertension Mother      Alzheimer's disease Mother      Diabetes Maternal Aunt      Macular degeneration Maternal Aunt      Diabetes Paternal Grandmother      Colon cancer Father      Melanoma Neg Hx       Social History     Socioeconomic History    Marital status: Single   Tobacco Use    Smoking status: Former    Smokeless tobacco: Never   Substance and Sexual Activity    Alcohol use: No    Drug use: No     Social Determinants of Health     Financial Resource Strain: Low Risk  (6/19/2019)    Overall Financial Resource Strain (CARDIA)     Difficulty of Paying Living Expenses: Not hard at all   Food Insecurity: No  Food Insecurity (6/19/2019)    Hunger Vital Sign     Worried About Running Out of Food in the Last Year: Never true     Ran Out of Food in the Last Year: Never true   Transportation Needs: Unknown (6/19/2019)    PRAPARE - Transportation     Lack of Transportation (Medical): No   Physical Activity: Sufficiently Active (6/19/2019)    Exercise Vital Sign     Days of Exercise per Week: 2 days     Minutes of Exercise per Session: 90 min   Stress: No Stress Concern Present (6/19/2019)    [employer] [place] [employer] - Occupational Stress Questionnaire     Feeling of Stress : Not at all     Review of Systems   Constitutional:  Negative for activity change, appetite change, chills, diaphoresis, fatigue, fever and unexpected weight change.   HENT:  Negative for drooling, ear discharge, ear pain, facial swelling, hearing loss, mouth sores, nosebleeds, postnasal drip, rhinorrhea, sinus pressure, sneezing, sore throat, tinnitus, trouble swallowing and voice change.    Eyes:  Negative for photophobia, redness and visual disturbance.   Respiratory:  Negative for apnea, cough, choking, chest tightness, shortness of breath and wheezing.    Cardiovascular:  Negative for chest pain, palpitations and leg swelling.   Gastrointestinal:  Negative for abdominal distention, abdominal pain, anal bleeding, blood in stool, constipation, diarrhea, nausea, rectal pain and vomiting.   Endocrine: Negative for cold intolerance, heat intolerance, polydipsia, polyphagia and polyuria.   Genitourinary:  Negative for difficulty urinating, dysuria, enuresis, flank pain, frequency, genital sores, hematuria and urgency.   Musculoskeletal:  Positive for arthralgias. Negative for back pain, gait problem, joint swelling, myalgias, neck pain and neck stiffness.   Skin:  Negative for color change, pallor, rash and wound.   Allergic/Immunologic: Negative for food allergies and immunocompromised state.   Neurological:  Negative for dizziness,  tremors, seizures, syncope, facial asymmetry, speech difficulty, weakness, light-headedness, numbness and headaches.   Hematological:  Negative for adenopathy. Does not bruise/bleed easily.   Psychiatric/Behavioral:  Positive for confusion. Negative for agitation, behavioral problems, decreased concentration, dysphoric mood, hallucinations, self-injury, sleep disturbance and suicidal ideas. The patient is not nervous/anxious and is not hyperactive.        Objective:      Physical Exam  Vitals and nursing note reviewed.   Constitutional:       General: He is not in acute distress.     Appearance: Normal appearance. He is well-developed. He is not diaphoretic.   HENT:      Head: Normocephalic and atraumatic.      Mouth/Throat:      Pharynx: No oropharyngeal exudate.   Eyes:      General: No scleral icterus.     Pupils: Pupils are equal, round, and reactive to light.   Neck:      Thyroid: No thyromegaly.      Vascular: No carotid bruit or JVD.      Trachea: No tracheal deviation.   Cardiovascular:      Rate and Rhythm: Normal rate and regular rhythm.      Heart sounds: Normal heart sounds.   Pulmonary:      Effort: Pulmonary effort is normal. No respiratory distress.      Breath sounds: Normal breath sounds. No wheezing or rales.   Chest:      Chest wall: No tenderness.   Abdominal:      General: Bowel sounds are normal. There is no distension.      Palpations: Abdomen is soft.      Tenderness: There is no abdominal tenderness. There is no guarding or rebound.   Musculoskeletal:         General: No tenderness. Normal range of motion.      Cervical back: Normal range of motion and neck supple.   Lymphadenopathy:      Cervical: No cervical adenopathy.   Skin:     General: Skin is warm and dry.      Coloration: Skin is not pale.      Findings: No erythema or rash.   Neurological:      Mental Status: He is alert and oriented to person, place, and time.   Psychiatric:         Behavior: Behavior normal.         Thought  Content: Thought content normal.         Judgment: Judgment normal.         CMP  Sodium   Date Value Ref Range Status   05/01/2024 144 136 - 145 mmol/L Final     Potassium   Date Value Ref Range Status   05/01/2024 3.2 (L) 3.5 - 5.1 mmol/L Final     Chloride   Date Value Ref Range Status   05/01/2024 103 95 - 110 mmol/L Final     CO2   Date Value Ref Range Status   05/01/2024 33 (H) 23 - 29 mmol/L Final     Glucose   Date Value Ref Range Status   05/01/2024 93 70 - 110 mg/dL Final     BUN   Date Value Ref Range Status   05/01/2024 26 (H) 8 - 23 mg/dL Final     Creatinine   Date Value Ref Range Status   05/01/2024 1.6 (H) 0.5 - 1.4 mg/dL Final     Calcium   Date Value Ref Range Status   05/01/2024 9.7 8.7 - 10.5 mg/dL Final     Total Protein   Date Value Ref Range Status   05/01/2024 7.5 6.0 - 8.4 g/dL Final     Albumin   Date Value Ref Range Status   05/01/2024 4.1 3.5 - 5.2 g/dL Final     Total Bilirubin   Date Value Ref Range Status   05/01/2024 0.4 0.1 - 1.0 mg/dL Final     Comment:     For infants and newborns, interpretation of results should be based  on gestational age, weight and in agreement with clinical  observations.    Premature Infant recommended reference ranges:  Up to 24 hours.............<8.0 mg/dL  Up to 48 hours............<12.0 mg/dL  3-5 days..................<15.0 mg/dL  6-29 days.................<15.0 mg/dL       Alkaline Phosphatase   Date Value Ref Range Status   05/01/2024 86 55 - 135 U/L Final     AST   Date Value Ref Range Status   05/01/2024 16 10 - 40 U/L Final     ALT   Date Value Ref Range Status   05/01/2024 10 10 - 44 U/L Final     Anion Gap   Date Value Ref Range Status   05/01/2024 8 8 - 16 mmol/L Final     eGFR if    Date Value Ref Range Status   03/07/2022 >60.0 >60 mL/min/1.73 m^2 Final     eGFR if non    Date Value Ref Range Status   03/07/2022 56.1 (A) >60 mL/min/1.73 m^2 Final     Comment:     Calculation used to obtain the estimated  glomerular filtration  rate (eGFR) is the CKD-EPI equation.        Lab Results   Component Value Date    WBC 6.46 05/01/2024    HGB 13.1 (L) 05/01/2024    HCT 38.1 (L) 05/01/2024    MCV 91 05/01/2024     05/01/2024     Lab Results   Component Value Date    CHOL 175 04/05/2023     Lab Results   Component Value Date    HDL 59 04/05/2023     Lab Results   Component Value Date    LDLCALC 104.4 04/05/2023     Lab Results   Component Value Date    TRIG 58 04/05/2023     Lab Results   Component Value Date    CHOLHDL 33.7 04/05/2023     Lab Results   Component Value Date    TSH 0.960 05/01/2024     Lab Results   Component Value Date    HGBA1C 5.4 06/08/2021     Assessment:       1. Alzheimer's dementia without behavioral disturbance, psychotic disturbance, mood disturbance, or anxiety, unspecified dementia severity, unspecified timing of dementia onset    2. Anemia, unspecified type    3. Chronic kidney disease, stage 3a    4. Dyslipidemia    5. Primary hypertension    6. Parkinson's disease, unspecified whether dyskinesia present, unspecified whether manifestations fluctuate    7. Encounter for prostate cancer screening        Plan:   Alzheimer's dementia without behavioral disturbance, psychotic disturbance, mood disturbance, or anxiety, unspecified dementia severity, unspecified timing of dementia onset--------------------sees neurology----------------------------    Anemia, unspecified type----------------------f/u hematology---------------------------------    Chronic kidney disease, stage 3a--------------stable-----------------------    Dyslipidemia  -     Lipid Panel; Future; Expected date: 08/13/2024    Primary hypertension    Parkinson's disease, unspecified whether dyskinesia present, unspecified whether manifestations fluctuate    Encounter for prostate cancer screening  -     PSA, Screening; Future; Expected date: 11/13/2024    Stable----------------------continue meds------------------watch  diet,exercise--------------as above--------------------------f/u 6 months-

## 2024-08-14 LAB — B2 MICROGLOB SERPL-MCNC: 3.5 UG/ML (ref 0–2.5)

## 2024-08-19 LAB
KAPPA LC SER QL IA: 3.66 MG/DL (ref 0.33–1.94)
KAPPA LC/LAMBDA SER IA: 1.94 (ref 0.26–1.65)
LAMBDA LC SER QL IA: 1.89 MG/DL (ref 0.57–2.63)

## 2024-09-14 NOTE — TELEPHONE ENCOUNTER
No care due was identified.  [facility] Embedded Care Due Messages. Reference number: [id number].   9/14/2024 12:30:52 PM CDT

## 2024-09-16 RX ORDER — LACTULOSE 10 G/15ML
SOLUTION ORAL
Qty: 60 ML | Refills: 0 | Status: SHIPPED | OUTPATIENT
Start: 2024-09-16

## 2024-10-09 DIAGNOSIS — D64.9 ANEMIA, UNSPECIFIED TYPE: Primary | ICD-10-CM

## 2024-10-14 ENCOUNTER — HOSPITAL ENCOUNTER (INPATIENT)
Facility: HOSPITAL | Age: 69
LOS: 3 days | Discharge: NURSING, REHAB OR LONG-TERM CARE | DRG: 536 | End: 2024-10-17
Attending: EMERGENCY MEDICINE | Admitting: HOSPITALIST
Payer: COMMERCIAL

## 2024-10-14 ENCOUNTER — HOSPITAL ENCOUNTER (OUTPATIENT)
Dept: RADIOLOGY | Facility: HOSPITAL | Age: 69
Discharge: HOME | End: 2024-10-14
Attending: INTERNAL MEDICINE
Payer: COMMERCIAL

## 2024-10-14 ENCOUNTER — OFFICE VISIT (OUTPATIENT)
Dept: FAMILY MEDICINE | Facility: CLINIC | Age: 69
End: 2024-10-14
Payer: COMMERCIAL

## 2024-10-14 ENCOUNTER — TELEPHONE (OUTPATIENT)
Dept: FAMILY MEDICINE | Facility: CLINIC | Age: 69
End: 2024-10-14

## 2024-10-14 VITALS
HEART RATE: 93 BPM | OXYGEN SATURATION: 98 % | TEMPERATURE: 97 F | BODY MASS INDEX: 18.37 KG/M2 | SYSTOLIC BLOOD PRESSURE: 110 MMHG | DIASTOLIC BLOOD PRESSURE: 60 MMHG | HEIGHT: 70 IN | WEIGHT: 128.31 LBS

## 2024-10-14 DIAGNOSIS — M54.50 CHRONIC BILATERAL LOW BACK PAIN WITHOUT SCIATICA: ICD-10-CM

## 2024-10-14 DIAGNOSIS — Z01.818 PRE-OPERATIVE CLEARANCE: ICD-10-CM

## 2024-10-14 DIAGNOSIS — F03.90 DEMENTIA, UNSPECIFIED DEMENTIA SEVERITY, UNSPECIFIED DEMENTIA TYPE, UNSPECIFIED WHETHER BEHAVIORAL, PSYCHOTIC, OR MOOD DISTURBANCE OR ANXIETY: ICD-10-CM

## 2024-10-14 DIAGNOSIS — R53.1 WEAKNESS: ICD-10-CM

## 2024-10-14 DIAGNOSIS — G20.A1 PARKINSON'S DISEASE, UNSPECIFIED WHETHER DYSKINESIA PRESENT, UNSPECIFIED WHETHER MANIFESTATIONS FLUCTUATE: ICD-10-CM

## 2024-10-14 DIAGNOSIS — G89.29 CHRONIC BILATERAL LOW BACK PAIN WITHOUT SCIATICA: ICD-10-CM

## 2024-10-14 DIAGNOSIS — R29.6 FALLS FREQUENTLY: ICD-10-CM

## 2024-10-14 DIAGNOSIS — K59.09 OTHER CONSTIPATION: ICD-10-CM

## 2024-10-14 DIAGNOSIS — S72.141A CLOSED COMMINUTED INTERTROCHANTERIC FRACTURE OF RIGHT FEMUR, INITIAL ENCOUNTER: Primary | ICD-10-CM

## 2024-10-14 DIAGNOSIS — S72.91XA CLOSED FRACTURE OF RIGHT FEMUR, UNSPECIFIED FRACTURE MORPHOLOGY, UNSPECIFIED PORTION OF FEMUR, INITIAL ENCOUNTER: Primary | ICD-10-CM

## 2024-10-14 DIAGNOSIS — N18.31 CHRONIC KIDNEY DISEASE, STAGE 3A: ICD-10-CM

## 2024-10-14 DIAGNOSIS — M79.604 LEG PAIN, ANTERIOR, RIGHT: ICD-10-CM

## 2024-10-14 DIAGNOSIS — D64.9 ANEMIA, UNSPECIFIED TYPE: Primary | ICD-10-CM

## 2024-10-14 DIAGNOSIS — I10 PRIMARY HYPERTENSION: Chronic | ICD-10-CM

## 2024-10-14 DIAGNOSIS — E78.5 DYSLIPIDEMIA: ICD-10-CM

## 2024-10-14 LAB
ALBUMIN SERPL BCP-MCNC: 3.3 G/DL (ref 3.5–5.2)
ALP SERPL-CCNC: 165 U/L (ref 55–135)
ALT SERPL W/O P-5'-P-CCNC: 10 U/L (ref 10–44)
ANION GAP SERPL CALC-SCNC: 11 MMOL/L (ref 8–16)
APTT PPP: 26.7 SEC (ref 21–32)
AST SERPL-CCNC: 19 U/L (ref 10–40)
BACTERIA #/AREA URNS HPF: NORMAL /HPF
BASOPHILS # BLD AUTO: 0.03 K/UL (ref 0–0.2)
BASOPHILS NFR BLD: 0.4 % (ref 0–1.9)
BILIRUB SERPL-MCNC: 0.4 MG/DL (ref 0.1–1)
BILIRUB UR QL STRIP: NEGATIVE
BUN SERPL-MCNC: 23 MG/DL (ref 8–23)
CALCIUM SERPL-MCNC: 8.8 MG/DL (ref 8.7–10.5)
CHLORIDE SERPL-SCNC: 102 MMOL/L (ref 95–110)
CLARITY UR: CLEAR
CO2 SERPL-SCNC: 31 MMOL/L (ref 23–29)
COLOR UR: YELLOW
CREAT SERPL-MCNC: 1.4 MG/DL (ref 0.5–1.4)
DIFFERENTIAL METHOD BLD: ABNORMAL
EOSINOPHIL # BLD AUTO: 0.1 K/UL (ref 0–0.5)
EOSINOPHIL NFR BLD: 1.7 % (ref 0–8)
ERYTHROCYTE [DISTWIDTH] IN BLOOD BY AUTOMATED COUNT: 12.1 % (ref 11.5–14.5)
EST. GFR  (NO RACE VARIABLE): 54 ML/MIN/1.73 M^2
GLUCOSE SERPL-MCNC: 90 MG/DL (ref 70–110)
GLUCOSE UR QL STRIP: NEGATIVE
HCT VFR BLD AUTO: 35.4 % (ref 40–54)
HCV AB SERPL QL IA: NEGATIVE
HEP C VIRUS HOLD SPECIMEN: NORMAL
HGB BLD-MCNC: 12.2 G/DL (ref 14–18)
HGB UR QL STRIP: NEGATIVE
HIV 1+2 AB+HIV1 P24 AG SERPL QL IA: NEGATIVE
HYALINE CASTS #/AREA URNS LPF: 0 /LPF
IMM GRANULOCYTES # BLD AUTO: 0.03 K/UL (ref 0–0.04)
IMM GRANULOCYTES NFR BLD AUTO: 0.4 % (ref 0–0.5)
INR PPP: 1 (ref 0.8–1.2)
KETONES UR QL STRIP: NEGATIVE
LEUKOCYTE ESTERASE UR QL STRIP: NEGATIVE
LYMPHOCYTES # BLD AUTO: 1.4 K/UL (ref 1–4.8)
LYMPHOCYTES NFR BLD: 17.7 % (ref 18–48)
MCH RBC QN AUTO: 31 PG (ref 27–31)
MCHC RBC AUTO-ENTMCNC: 34.5 G/DL (ref 32–36)
MCV RBC AUTO: 90 FL (ref 82–98)
MICROSCOPIC COMMENT: NORMAL
MONOCYTES # BLD AUTO: 0.6 K/UL (ref 0.3–1)
MONOCYTES NFR BLD: 7.1 % (ref 4–15)
NEUTROPHILS # BLD AUTO: 5.7 K/UL (ref 1.8–7.7)
NEUTROPHILS NFR BLD: 72.7 % (ref 38–73)
NITRITE UR QL STRIP: NEGATIVE
NRBC BLD-RTO: 0 /100 WBC
PH UR STRIP: 7 [PH] (ref 5–8)
PLATELET # BLD AUTO: 367 K/UL (ref 150–450)
PMV BLD AUTO: 9.9 FL (ref 9.2–12.9)
POTASSIUM SERPL-SCNC: 3.2 MMOL/L (ref 3.5–5.1)
PROT SERPL-MCNC: 6.4 G/DL (ref 6–8.4)
PROT UR QL STRIP: ABNORMAL
PROTHROMBIN TIME: 11 SEC (ref 9–12.5)
RBC # BLD AUTO: 3.94 M/UL (ref 4.6–6.2)
RBC #/AREA URNS HPF: 0 /HPF (ref 0–4)
SODIUM SERPL-SCNC: 144 MMOL/L (ref 136–145)
SP GR UR STRIP: 1.02 (ref 1–1.03)
URN SPEC COLLECT METH UR: ABNORMAL
UROBILINOGEN UR STRIP-ACNC: NEGATIVE EU/DL
WBC # BLD AUTO: 7.79 K/UL (ref 3.9–12.7)
WBC #/AREA URNS HPF: 0 /HPF (ref 0–5)
WBC CLUMPS URNS QL MICRO: NORMAL

## 2024-10-14 PROCEDURE — 99214 OFFICE O/P EST MOD 30 MIN: CPT | Mod: S$GLB,,, | Performed by: INTERNAL MEDICINE

## 2024-10-14 PROCEDURE — 11000001 HC ACUTE MED/SURG PRIVATE ROOM

## 2024-10-14 PROCEDURE — 85025 COMPLETE CBC W/AUTO DIFF WBC: CPT | Mod: 91 | Performed by: EMERGENCY MEDICINE

## 2024-10-14 PROCEDURE — G2211 COMPLEX E/M VISIT ADD ON: HCPCS | Mod: S$GLB,,, | Performed by: INTERNAL MEDICINE

## 2024-10-14 PROCEDURE — 1100F PTFALLS ASSESS-DOCD GE2>/YR: CPT | Mod: CPTII,S$GLB,, | Performed by: INTERNAL MEDICINE

## 2024-10-14 PROCEDURE — 99999 PR PBB SHADOW E&M-EST. PATIENT-LVL V: CPT | Mod: PBBFAC,,, | Performed by: INTERNAL MEDICINE

## 2024-10-14 PROCEDURE — 3074F SYST BP LT 130 MM HG: CPT | Mod: CPTII,S$GLB,, | Performed by: INTERNAL MEDICINE

## 2024-10-14 PROCEDURE — 72220 X-RAY EXAM SACRUM TAILBONE: CPT | Mod: 26,,, | Performed by: RADIOLOGY

## 2024-10-14 PROCEDURE — 86803 HEPATITIS C AB TEST: CPT | Performed by: EMERGENCY MEDICINE

## 2024-10-14 PROCEDURE — 85610 PROTHROMBIN TIME: CPT | Performed by: NURSE PRACTITIONER

## 2024-10-14 PROCEDURE — 63600175 PHARM REV CODE 636 W HCPCS: Performed by: EMERGENCY MEDICINE

## 2024-10-14 PROCEDURE — 73552 X-RAY EXAM OF FEMUR 2/>: CPT | Mod: TC,FY,PO,RT

## 2024-10-14 PROCEDURE — 72100 X-RAY EXAM L-S SPINE 2/3 VWS: CPT | Mod: 26,,, | Performed by: RADIOLOGY

## 2024-10-14 PROCEDURE — 74019 RADEX ABDOMEN 2 VIEWS: CPT | Mod: TC,FY,PO

## 2024-10-14 PROCEDURE — 73552 X-RAY EXAM OF FEMUR 2/>: CPT | Mod: 26,RT,, | Performed by: RADIOLOGY

## 2024-10-14 PROCEDURE — 3066F NEPHROPATHY DOC TX: CPT | Mod: CPTII,S$GLB,, | Performed by: INTERNAL MEDICINE

## 2024-10-14 PROCEDURE — 99285 EMERGENCY DEPT VISIT HI MDM: CPT | Mod: 25

## 2024-10-14 PROCEDURE — 25000003 PHARM REV CODE 250: Performed by: EMERGENCY MEDICINE

## 2024-10-14 PROCEDURE — 1125F AMNT PAIN NOTED PAIN PRSNT: CPT | Mod: CPTII,S$GLB,, | Performed by: INTERNAL MEDICINE

## 2024-10-14 PROCEDURE — 3008F BODY MASS INDEX DOCD: CPT | Mod: CPTII,S$GLB,, | Performed by: INTERNAL MEDICINE

## 2024-10-14 PROCEDURE — 74019 RADEX ABDOMEN 2 VIEWS: CPT | Mod: 26,,, | Performed by: RADIOLOGY

## 2024-10-14 PROCEDURE — 3288F FALL RISK ASSESSMENT DOCD: CPT | Mod: CPTII,S$GLB,, | Performed by: INTERNAL MEDICINE

## 2024-10-14 PROCEDURE — 72100 X-RAY EXAM L-S SPINE 2/3 VWS: CPT | Mod: TC,FY,PO

## 2024-10-14 PROCEDURE — 93010 ELECTROCARDIOGRAM REPORT: CPT | Mod: ,,, | Performed by: INTERNAL MEDICINE

## 2024-10-14 PROCEDURE — 93005 ELECTROCARDIOGRAM TRACING: CPT

## 2024-10-14 PROCEDURE — 3078F DIAST BP <80 MM HG: CPT | Mod: CPTII,S$GLB,, | Performed by: INTERNAL MEDICINE

## 2024-10-14 PROCEDURE — 87389 HIV-1 AG W/HIV-1&-2 AB AG IA: CPT | Performed by: EMERGENCY MEDICINE

## 2024-10-14 PROCEDURE — 85730 THROMBOPLASTIN TIME PARTIAL: CPT | Performed by: NURSE PRACTITIONER

## 2024-10-14 PROCEDURE — 21400001 HC TELEMETRY ROOM

## 2024-10-14 PROCEDURE — 1159F MED LIST DOCD IN RCRD: CPT | Mod: CPTII,S$GLB,, | Performed by: INTERNAL MEDICINE

## 2024-10-14 PROCEDURE — 80053 COMPREHEN METABOLIC PANEL: CPT | Performed by: EMERGENCY MEDICINE

## 2024-10-14 PROCEDURE — 81000 URINALYSIS NONAUTO W/SCOPE: CPT | Performed by: EMERGENCY MEDICINE

## 2024-10-14 PROCEDURE — 72220 X-RAY EXAM SACRUM TAILBONE: CPT | Mod: TC,FY,PO

## 2024-10-14 PROCEDURE — 4010F ACE/ARB THERAPY RXD/TAKEN: CPT | Mod: CPTII,S$GLB,, | Performed by: INTERNAL MEDICINE

## 2024-10-14 RX ORDER — AMLODIPINE BESYLATE 5 MG/1
10 TABLET ORAL
Status: COMPLETED | OUTPATIENT
Start: 2024-10-14 | End: 2024-10-14

## 2024-10-14 RX ORDER — ACETAMINOPHEN 325 MG/1
650 TABLET ORAL EVERY 8 HOURS PRN
Status: DISCONTINUED | OUTPATIENT
Start: 2024-10-15 | End: 2024-10-17 | Stop reason: HOSPADM

## 2024-10-14 RX ORDER — NALOXONE HCL 0.4 MG/ML
0.02 VIAL (ML) INJECTION
Status: DISCONTINUED | OUTPATIENT
Start: 2024-10-15 | End: 2024-10-17 | Stop reason: HOSPADM

## 2024-10-14 RX ORDER — IBUPROFEN 200 MG
24 TABLET ORAL
Status: DISCONTINUED | OUTPATIENT
Start: 2024-10-15 | End: 2024-10-17 | Stop reason: HOSPADM

## 2024-10-14 RX ORDER — SIMETHICONE 80 MG
1 TABLET,CHEWABLE ORAL 4 TIMES DAILY PRN
Status: DISCONTINUED | OUTPATIENT
Start: 2024-10-15 | End: 2024-10-17 | Stop reason: HOSPADM

## 2024-10-14 RX ORDER — ALUMINUM HYDROXIDE, MAGNESIUM HYDROXIDE, AND SIMETHICONE 1200; 120; 1200 MG/30ML; MG/30ML; MG/30ML
30 SUSPENSION ORAL 4 TIMES DAILY PRN
Status: DISCONTINUED | OUTPATIENT
Start: 2024-10-15 | End: 2024-10-17 | Stop reason: HOSPADM

## 2024-10-14 RX ORDER — TALC
6 POWDER (GRAM) TOPICAL NIGHTLY PRN
Status: DISCONTINUED | OUTPATIENT
Start: 2024-10-15 | End: 2024-10-17 | Stop reason: HOSPADM

## 2024-10-14 RX ORDER — GLUCAGON 1 MG
1 KIT INJECTION
Status: DISCONTINUED | OUTPATIENT
Start: 2024-10-15 | End: 2024-10-17 | Stop reason: HOSPADM

## 2024-10-14 RX ORDER — MORPHINE SULFATE 4 MG/ML
2 INJECTION, SOLUTION INTRAMUSCULAR; INTRAVENOUS EVERY 4 HOURS PRN
Status: DISCONTINUED | OUTPATIENT
Start: 2024-10-15 | End: 2024-10-17 | Stop reason: HOSPADM

## 2024-10-14 RX ORDER — ACETAMINOPHEN 650 MG/1
650 SUPPOSITORY RECTAL EVERY 4 HOURS PRN
Status: DISCONTINUED | OUTPATIENT
Start: 2024-10-15 | End: 2024-10-17 | Stop reason: HOSPADM

## 2024-10-14 RX ORDER — SODIUM CHLORIDE 0.9 % (FLUSH) 0.9 %
3 SYRINGE (ML) INJECTION EVERY 12 HOURS PRN
Status: DISCONTINUED | OUTPATIENT
Start: 2024-10-15 | End: 2024-10-17 | Stop reason: HOSPADM

## 2024-10-14 RX ORDER — HYDROCODONE BITARTRATE AND ACETAMINOPHEN 5; 325 MG/1; MG/1
1 TABLET ORAL EVERY 6 HOURS PRN
Status: DISCONTINUED | OUTPATIENT
Start: 2024-10-15 | End: 2024-10-17 | Stop reason: HOSPADM

## 2024-10-14 RX ORDER — ONDANSETRON HYDROCHLORIDE 2 MG/ML
4 INJECTION, SOLUTION INTRAVENOUS EVERY 8 HOURS PRN
Status: DISCONTINUED | OUTPATIENT
Start: 2024-10-15 | End: 2024-10-17 | Stop reason: HOSPADM

## 2024-10-14 RX ORDER — MORPHINE SULFATE 4 MG/ML
2 INJECTION, SOLUTION INTRAMUSCULAR; INTRAVENOUS
Status: COMPLETED | OUTPATIENT
Start: 2024-10-14 | End: 2024-10-14

## 2024-10-14 RX ORDER — IBUPROFEN 200 MG
16 TABLET ORAL
Status: DISCONTINUED | OUTPATIENT
Start: 2024-10-15 | End: 2024-10-17 | Stop reason: HOSPADM

## 2024-10-14 RX ORDER — POLYETHYLENE GLYCOL 3350 17 G/17G
17 POWDER, FOR SOLUTION ORAL DAILY PRN
Status: DISCONTINUED | OUTPATIENT
Start: 2024-10-15 | End: 2024-10-17 | Stop reason: HOSPADM

## 2024-10-14 RX ORDER — SODIUM CHLORIDE, SODIUM LACTATE, POTASSIUM CHLORIDE, CALCIUM CHLORIDE 600; 310; 30; 20 MG/100ML; MG/100ML; MG/100ML; MG/100ML
INJECTION, SOLUTION INTRAVENOUS CONTINUOUS
Status: DISCONTINUED | OUTPATIENT
Start: 2024-10-15 | End: 2024-10-16

## 2024-10-14 RX ADMIN — AMLODIPINE BESYLATE 10 MG: 5 TABLET ORAL at 11:10

## 2024-10-14 RX ADMIN — MORPHINE SULFATE 2 MG: 4 INJECTION INTRAVENOUS at 11:10

## 2024-10-14 NOTE — PROGRESS NOTES
Subjective:       Patient ID: Jose Long is a 68 y.o. male.    Chief Complaint: falls, leg pain; Hypertension; Dementia; Hyperlipidemia; and parkinsons    Hypertension  Pertinent negatives include no chest pain, headaches, neck pain, palpitations or shortness of breath.   Hyperlipidemia  Associated symptoms include myalgias. Pertinent negatives include no chest pain or shortness of breath.     Past Medical History:   Diagnosis Date    Dyslipidemia     Hypertension     Multiple allergies      Past Surgical History:   Procedure Laterality Date    COLONOSCOPY N/A 6/29/2018    Procedure: COLONOSCOPY;  Surgeon: Kermit Leone MD;  Location: [facility] ENDO;  Service: Endoscopy;  Laterality: N/A;    COLONOSCOPY N/A 5/9/2023    Procedure: COLONOSCOPY;  Surgeon: Jazmin Kraus MD;  Location: [facility] ENDO;  Service: Endoscopy;  Laterality: N/A;     Family History   Problem Relation Name Age of Onset    Cataracts Mother      Hypertension Mother      Alzheimer's disease Mother      Diabetes Maternal Aunt      Macular degeneration Maternal Aunt      Diabetes Paternal Grandmother      Colon cancer Father      Melanoma Neg Hx       Social History     Socioeconomic History    Marital status: Single   Tobacco Use    Smoking status: Former    Smokeless tobacco: Never   Substance and Sexual Activity    Alcohol use: No    Drug use: No     Social Drivers of Health     Financial Resource Strain: Low Risk  (6/19/2019)    Overall Financial Resource Strain (CARDIA)     Difficulty of Paying Living Expenses: Not hard at all   Food Insecurity: No Food Insecurity (6/19/2019)    Hunger Vital Sign     Worried About Running Out of Food in the Last Year: Never true     Ran Out of Food in the Last Year: Never true   Transportation Needs: Unknown (6/19/2019)    PRAPARE - Transportation     Lack of Transportation (Medical): No   Physical Activity: Sufficiently Active (6/19/2019)    Exercise Vital Sign     Days of Exercise per Week: 2 days     Minutes  of Exercise per Session: 90 min   Stress: No Stress Concern Present (6/19/2019)    [employer] - Occupational Stress Questionnaire     Feeling of Stress : Not at all     Review of Systems   Constitutional:  Positive for appetite change. Negative for activity change, chills, diaphoresis, fatigue, fever and unexpected weight change.   HENT:  Negative for drooling, ear discharge, ear pain, facial swelling, hearing loss, mouth sores, nosebleeds, postnasal drip, rhinorrhea, sinus pressure, sneezing, sore throat, tinnitus, trouble swallowing and voice change.    Eyes:  Negative for photophobia, redness and visual disturbance.   Respiratory:  Negative for apnea, cough, choking, chest tightness, shortness of breath and wheezing.    Cardiovascular:  Negative for chest pain and palpitations.   Gastrointestinal:  Positive for constipation. Negative for abdominal distention, abdominal pain, anal bleeding, blood in stool, diarrhea, nausea and vomiting.   Endocrine: Negative for cold intolerance, heat intolerance, polydipsia, polyphagia and polyuria.   Genitourinary:  Negative for difficulty urinating, dysuria, enuresis, flank pain, frequency, genital sores, hematuria and urgency.   Musculoskeletal:  Positive for arthralgias, back pain, gait problem and myalgias. Negative for joint swelling, neck pain and neck stiffness.   Skin:  Negative for color change, pallor, rash and wound.   Allergic/Immunologic: Negative for food allergies and immunocompromised state.   Neurological:  Positive for weakness. Negative for dizziness, tremors, seizures, syncope, facial asymmetry, speech difficulty, light-headedness, numbness and headaches.   Hematological:  Negative for adenopathy. Does not bruise/bleed easily.   Psychiatric/Behavioral:  Positive for confusion, hallucinations and sleep disturbance. Negative for agitation, behavioral problems, dysphoric mood, self-injury and suicidal ideas. The patient is not  nervous/anxious and is not hyperactive.        Objective:      Physical Exam  Vitals and nursing note reviewed.   Constitutional:       General: He is not in acute distress.     Appearance: Normal appearance. He is well-developed. He is not diaphoretic.   HENT:      Head: Normocephalic and atraumatic.      Mouth/Throat:      Pharynx: No oropharyngeal exudate.   Eyes:      General: No scleral icterus.     Pupils: Pupils are equal, round, and reactive to light.   Neck:      Thyroid: No thyromegaly.      Vascular: No carotid bruit or JVD.      Trachea: No tracheal deviation.   Cardiovascular:      Rate and Rhythm: Normal rate and regular rhythm.      Heart sounds: Normal heart sounds.   Pulmonary:      Effort: Pulmonary effort is normal. No respiratory distress.      Breath sounds: Normal breath sounds. No wheezing or rales.   Chest:      Chest wall: No tenderness.   Abdominal:      General: Bowel sounds are normal. There is no distension.      Palpations: Abdomen is soft.      Tenderness: There is no abdominal tenderness. There is no guarding or rebound.   Musculoskeletal:         General: No tenderness. Normal range of motion.      Cervical back: Normal range of motion and neck supple.   Lymphadenopathy:      Cervical: No cervical adenopathy.   Skin:     General: Skin is warm and dry.      Coloration: Skin is not pale.      Findings: No erythema or rash.   Neurological:      Mental Status: He is alert and oriented to person, place, and time.         CMP  Sodium   Date Value Ref Range Status   08/13/2024 147 (H) 136 - 145 mmol/L Final     Potassium   Date Value Ref Range Status   08/13/2024 3.4 (L) 3.5 - 5.1 mmol/L Final     Chloride   Date Value Ref Range Status   08/13/2024 104 95 - 110 mmol/L Final     CO2   Date Value Ref Range Status   08/13/2024 29 23 - 29 mmol/L Final     Glucose   Date Value Ref Range Status   08/13/2024 95 70 - 110 mg/dL Final     BUN   Date Value Ref Range Status   08/13/2024 23 8 - 23 mg/dL  Final     Creatinine   Date Value Ref Range Status   08/13/2024 1.6 (H) 0.5 - 1.4 mg/dL Final     Calcium   Date Value Ref Range Status   08/13/2024 9.5 8.7 - 10.5 mg/dL Final     Total Protein   Date Value Ref Range Status   08/13/2024 7.7 6.0 - 8.4 g/dL Final     Albumin   Date Value Ref Range Status   08/13/2024 4.4 3.5 - 5.2 g/dL Final     Total Bilirubin   Date Value Ref Range Status   08/13/2024 0.7 0.1 - 1.0 mg/dL Final     Comment:     For infants and newborns, interpretation of results should be based  on gestational age, weight and in agreement with clinical  observations.    Premature Infant recommended reference ranges:  Up to 24 hours.............<8.0 mg/dL  Up to 48 hours............<12.0 mg/dL  3-5 days..................<15.0 mg/dL  6-29 days.................<15.0 mg/dL       Alkaline Phosphatase   Date Value Ref Range Status   08/13/2024 56 55 - 135 U/L Final     AST   Date Value Ref Range Status   08/13/2024 24 10 - 40 U/L Final     ALT   Date Value Ref Range Status   08/13/2024 14 10 - 44 U/L Final     Anion Gap   Date Value Ref Range Status   08/13/2024 14 8 - 16 mmol/L Final     eGFR if    Date Value Ref Range Status   03/07/2022 >60.0 >60 mL/min/1.73 m^2 Final     eGFR if non    Date Value Ref Range Status   03/07/2022 56.1 (A) >60 mL/min/1.73 m^2 Final     Comment:     Calculation used to obtain the estimated glomerular filtration  rate (eGFR) is the CKD-EPI equation.        Lab Results   Component Value Date    WBC 5.72 08/13/2024    HGB 12.5 (L) 08/13/2024    HCT 37.0 (L) 08/13/2024    MCV 91 08/13/2024     08/13/2024     Lab Results   Component Value Date    CHOL 214 (H) 08/13/2024     Lab Results   Component Value Date    HDL 71 08/13/2024     Lab Results   Component Value Date    LDLCALC 127.8 08/13/2024     Lab Results   Component Value Date    TRIG 76 08/13/2024     Lab Results   Component Value Date    CHOLHDL 33.2 08/13/2024     Lab Results    Component Value Date    TSH 0.960 05/01/2024     Lab Results   Component Value Date    HGBA1C 5.4 06/08/2021     Assessment:       1. Anemia, unspecified type    2. Chronic kidney disease, stage 3a    3. Dyslipidemia    4. Primary hypertension    5. Parkinson's disease, unspecified whether dyskinesia present, unspecified whether manifestations fluctuate    6. Dementia, unspecified dementia severity, unspecified dementia type, unspecified whether behavioral, psychotic, or mood disturbance or anxiety    7. Other constipation    8. Chronic bilateral low back pain without sciatica    9. Leg pain, anterior, right    10. Falls frequently    11. Weakness        Plan:   Anemia, unspecified type---------sees hematology--------------    Chronic kidney disease, stage 3a----------stable---------------    Dyslipidemia    Primary hypertension    Parkinson's disease, unspecified whether dyskinesia present, unspecified whether manifestations fluctuate-------------------f/u neurology dr barth-------    Dementia, unspecified dementia severity, unspecified dementia type, unspecified whether behavioral, psychotic, or mood disturbance or anxiety    Other constipation----------------miralax--------------gi consult-  -     Ambulatory referral/consult to Gastroenterology; Future; Expected date: 10/21/2024  -     X-Ray Abdomen Flat And Erect; Future; Expected date: 10/14/2024    Chronic bilateral low back pain without sciatica----------------------tylenol prn-----------------  -     X-Ray Lumbar Spine AP And Lateral; Future; Expected date: 10/14/2024  -     X-Ray Sacrum And Coccyx; Future; Expected date: 10/14/2024    Leg pain, anterior, right  -     X-Ray Femur 2 View Right; Future; Expected date: 10/14/2024    Falls frequently--------------consider P.T.-----------    Weakness      Continue meds---------------f/u neurology------------f/u as scheduled----------

## 2024-10-15 ENCOUNTER — TELEPHONE (OUTPATIENT)
Dept: ORTHOPEDICS | Facility: CLINIC | Age: 69
End: 2024-10-15

## 2024-10-15 PROBLEM — S72.141A CLOSED DISPLACED INTERTROCHANTERIC FRACTURE OF RIGHT FEMUR: Status: ACTIVE | Noted: 2024-10-15

## 2024-10-15 LAB
ALBUMIN SERPL BCP-MCNC: 3.2 G/DL (ref 3.5–5.2)
ALP SERPL-CCNC: 169 U/L (ref 55–135)
ALT SERPL W/O P-5'-P-CCNC: 12 U/L (ref 10–44)
ANION GAP SERPL CALC-SCNC: 9 MMOL/L (ref 8–16)
AST SERPL-CCNC: 17 U/L (ref 10–40)
BASOPHILS # BLD AUTO: 0.03 K/UL (ref 0–0.2)
BASOPHILS NFR BLD: 0.4 % (ref 0–1.9)
BILIRUB SERPL-MCNC: 0.5 MG/DL (ref 0.1–1)
BUN SERPL-MCNC: 20 MG/DL (ref 8–23)
CALCIUM SERPL-MCNC: 8.6 MG/DL (ref 8.7–10.5)
CHLORIDE SERPL-SCNC: 104 MMOL/L (ref 95–110)
CO2 SERPL-SCNC: 32 MMOL/L (ref 23–29)
CREAT SERPL-MCNC: 1.2 MG/DL (ref 0.5–1.4)
DIFFERENTIAL METHOD BLD: ABNORMAL
EOSINOPHIL # BLD AUTO: 0.3 K/UL (ref 0–0.5)
EOSINOPHIL NFR BLD: 3.8 % (ref 0–8)
ERYTHROCYTE [DISTWIDTH] IN BLOOD BY AUTOMATED COUNT: 11.9 % (ref 11.5–14.5)
EST. GFR  (NO RACE VARIABLE): >60 ML/MIN/1.73 M^2
GLUCOSE SERPL-MCNC: 84 MG/DL (ref 70–110)
HCT VFR BLD AUTO: 31.7 % (ref 40–54)
HGB BLD-MCNC: 11 G/DL (ref 14–18)
IMM GRANULOCYTES # BLD AUTO: 0.02 K/UL (ref 0–0.04)
IMM GRANULOCYTES NFR BLD AUTO: 0.3 % (ref 0–0.5)
LYMPHOCYTES # BLD AUTO: 2.2 K/UL (ref 1–4.8)
LYMPHOCYTES NFR BLD: 28.4 % (ref 18–48)
MCH RBC QN AUTO: 30.9 PG (ref 27–31)
MCHC RBC AUTO-ENTMCNC: 34.7 G/DL (ref 32–36)
MCV RBC AUTO: 89 FL (ref 82–98)
MONOCYTES # BLD AUTO: 0.6 K/UL (ref 0.3–1)
MONOCYTES NFR BLD: 7.2 % (ref 4–15)
NEUTROPHILS # BLD AUTO: 4.6 K/UL (ref 1.8–7.7)
NEUTROPHILS NFR BLD: 59.9 % (ref 38–73)
NRBC BLD-RTO: 0 /100 WBC
OHS QRS DURATION: 82 MS
OHS QTC CALCULATION: 406 MS
PLATELET # BLD AUTO: 312 K/UL (ref 150–450)
PMV BLD AUTO: 9.7 FL (ref 9.2–12.9)
POTASSIUM SERPL-SCNC: 3.5 MMOL/L (ref 3.5–5.1)
PROT SERPL-MCNC: 6.2 G/DL (ref 6–8.4)
RBC # BLD AUTO: 3.56 M/UL (ref 4.6–6.2)
SODIUM SERPL-SCNC: 145 MMOL/L (ref 136–145)
WBC # BLD AUTO: 7.67 K/UL (ref 3.9–12.7)

## 2024-10-15 PROCEDURE — 80053 COMPREHEN METABOLIC PANEL: CPT | Performed by: NURSE PRACTITIONER

## 2024-10-15 PROCEDURE — 63600175 PHARM REV CODE 636 W HCPCS: Performed by: HOSPITALIST

## 2024-10-15 PROCEDURE — 63600175 PHARM REV CODE 636 W HCPCS: Performed by: NURSE PRACTITIONER

## 2024-10-15 PROCEDURE — 92610 EVALUATE SWALLOWING FUNCTION: CPT

## 2024-10-15 PROCEDURE — 94761 N-INVAS EAR/PLS OXIMETRY MLT: CPT

## 2024-10-15 PROCEDURE — 21400001 HC TELEMETRY ROOM

## 2024-10-15 PROCEDURE — 85025 COMPLETE CBC W/AUTO DIFF WBC: CPT | Performed by: NURSE PRACTITIONER

## 2024-10-15 PROCEDURE — 97530 THERAPEUTIC ACTIVITIES: CPT

## 2024-10-15 PROCEDURE — 92523 SPEECH SOUND LANG COMPREHEN: CPT

## 2024-10-15 PROCEDURE — 36415 COLL VENOUS BLD VENIPUNCTURE: CPT | Performed by: NURSE PRACTITIONER

## 2024-10-15 PROCEDURE — 25000003 PHARM REV CODE 250: Performed by: NURSE PRACTITIONER

## 2024-10-15 PROCEDURE — 97163 PT EVAL HIGH COMPLEX 45 MIN: CPT

## 2024-10-15 PROCEDURE — 97166 OT EVAL MOD COMPLEX 45 MIN: CPT

## 2024-10-15 PROCEDURE — 99223 1ST HOSP IP/OBS HIGH 75: CPT | Mod: ,,, | Performed by: ORTHOPAEDIC SURGERY

## 2024-10-15 RX ORDER — CARBIDOPA AND LEVODOPA 10; 100 MG/1; MG/1
1 TABLET ORAL 3 TIMES DAILY
Status: DISCONTINUED | OUTPATIENT
Start: 2024-10-15 | End: 2024-10-17 | Stop reason: HOSPADM

## 2024-10-15 RX ORDER — DEXTROMETHORPHAN HYDROBROMIDE, GUAIFENESIN 5; 100 MG/5ML; MG/5ML
650 LIQUID ORAL EVERY 8 HOURS PRN
Start: 2024-10-15

## 2024-10-15 RX ORDER — AMOXICILLIN 250 MG
1 CAPSULE ORAL 2 TIMES DAILY
Status: DISCONTINUED | OUTPATIENT
Start: 2024-10-15 | End: 2024-10-17 | Stop reason: HOSPADM

## 2024-10-15 RX ORDER — ENOXAPARIN SODIUM 100 MG/ML
40 INJECTION SUBCUTANEOUS EVERY 24 HOURS
Status: DISCONTINUED | OUTPATIENT
Start: 2024-10-15 | End: 2024-10-17 | Stop reason: HOSPADM

## 2024-10-15 RX ORDER — POLYETHYLENE GLYCOL 3350 17 G/17G
17 POWDER, FOR SOLUTION ORAL DAILY PRN
Qty: 30 PACKET | Refills: 0 | Status: SHIPPED | OUTPATIENT
Start: 2024-10-15 | End: 2024-10-17 | Stop reason: HOSPADM

## 2024-10-15 RX ORDER — FAMOTIDINE 20 MG/1
20 TABLET, FILM COATED ORAL DAILY
Status: DISCONTINUED | OUTPATIENT
Start: 2024-10-16 | End: 2024-10-17 | Stop reason: HOSPADM

## 2024-10-15 RX ORDER — MEMANTINE HYDROCHLORIDE 10 MG/1
10 TABLET ORAL 2 TIMES DAILY
Status: DISCONTINUED | OUTPATIENT
Start: 2024-10-15 | End: 2024-10-17 | Stop reason: HOSPADM

## 2024-10-15 RX ORDER — HYDRALAZINE HYDROCHLORIDE 20 MG/ML
10 INJECTION INTRAMUSCULAR; INTRAVENOUS EVERY 6 HOURS PRN
Status: DISCONTINUED | OUTPATIENT
Start: 2024-10-15 | End: 2024-10-17 | Stop reason: HOSPADM

## 2024-10-15 RX ORDER — LACTULOSE 10 G/15ML
10 SOLUTION ORAL DAILY
Status: DISCONTINUED | OUTPATIENT
Start: 2024-10-16 | End: 2024-10-17 | Stop reason: HOSPADM

## 2024-10-15 RX ADMIN — HYDROCODONE BITARTRATE AND ACETAMINOPHEN 1 TABLET: 5; 325 TABLET ORAL at 11:10

## 2024-10-15 RX ADMIN — HYDRALAZINE HYDROCHLORIDE 10 MG: 20 INJECTION, SOLUTION INTRAMUSCULAR; INTRAVENOUS at 04:10

## 2024-10-15 RX ADMIN — MEMANTINE 10 MG: 10 TABLET ORAL at 08:10

## 2024-10-15 RX ADMIN — ENOXAPARIN SODIUM 40 MG: 40 INJECTION SUBCUTANEOUS at 05:10

## 2024-10-15 RX ADMIN — ACETAMINOPHEN 650 MG: 325 TABLET ORAL at 05:10

## 2024-10-15 RX ADMIN — SODIUM CHLORIDE, POTASSIUM CHLORIDE, SODIUM LACTATE AND CALCIUM CHLORIDE: 600; 310; 30; 20 INJECTION, SOLUTION INTRAVENOUS at 12:10

## 2024-10-15 RX ADMIN — POLYETHYLENE GLYCOL 3350 17 G: 17 POWDER, FOR SOLUTION ORAL at 05:10

## 2024-10-15 RX ADMIN — SENNOSIDES AND DOCUSATE SODIUM 1 TABLET: 50; 8.6 TABLET ORAL at 05:10

## 2024-10-15 RX ADMIN — ACETAMINOPHEN 650 MG: 325 TABLET ORAL at 06:10

## 2024-10-15 RX ADMIN — CARBIDOPA AND LEVODOPA 1 TABLET: 10; 100 TABLET ORAL at 08:10

## 2024-10-15 NOTE — ED NOTES
Bed: 20  Expected date:   Expected time:   Means of arrival: Personal Transportation  Comments:  Ethan

## 2024-10-15 NOTE — H&P
[facility]  History & Physical    Patient Name: Jose Long  MRN: [id number]  Patient Class: IP- Inpatient  Admission Date: 10/14/2024  Attending Physician: Nils Alegria MD  Primary Care Provider: Harshad Hernandez MD         Patient information was obtained from patient, relative(s), past medical records, and ER records.     Subjective:     Principal Problem:Closed displaced intertrochanteric fracture of right femur    Chief Complaint:   Chief Complaint   Patient presents with    Fall     Hx of parkinsons and fell recently. Went to PCP this AM and had x-rays. MD called and advised to go to ED for poss femur fx.         HPI: Jose Long is a 68 y.o. male with a PMH  has a past medical history of Alzheimer's dementia, Dyslipidemia, Hypertension, and Multiple allergies.presented to the ED due to right hip pain following a fall about a week ago. Pt's son reports that the pt has fallen about once a week for the past month. Pt's sxs are constant and moderate in severity. Per the son, the pt was able to get up from the fall, but now needs assistance to get around and walk unlike before.  Patient was evaluated by his PCP earlier yesterday and had plain film imaging performed.  Patient was notified to proceed to the ER for further evaluation for possible right femur fracture.  Reports only mild discomfort in right hip.  Denies any other complaints at this time.  Pt has parkinson's, dementia, and alzheimer's which effect his mental status and fatigue regularly with exertion.     ER workup revealed CBC to be unremarkable.  CMP revealed potassium of 3.2.  UA unremarkable.  Plain film imaging of sacrum and lumbar spine negative for acute findings.  Plain film imaging of right femur revealed questionable nondisplaced intertrochanteric proximal femoral fracture.  CTA head without contrast revealed  Positive acute fracture right femur greater trochanter comminuted minimally  displaced.  No joint malalignment.  Orthopedic provider consulted.  Recommend Hospital Medicine to admit for surgical repair in a.m..  Patient and family member in agreement with treatment plan.  Patient will be admitted under inpatient status.      PCP: Harshad Hernandez      Past Medical History:   Diagnosis Date    Alzheimer's dementia     Dyslipidemia     Hypertension     Multiple allergies        Past Surgical History:   Procedure Laterality Date    COLONOSCOPY N/A 6/29/2018    Procedure: COLONOSCOPY;  Surgeon: Kermit Leone MD;  Location: [facility];  Service: Endoscopy;  Laterality: N/A;    COLONOSCOPY N/A 5/9/2023    Procedure: COLONOSCOPY;  Surgeon: Jazmin Kraus MD;  Location: [facility];  Service: Endoscopy;  Laterality: N/A;       Review of patient's allergies indicates:  No Known Allergies    No current facility-administered medications on file prior to encounter.     Current Outpatient Medications on File Prior to Encounter   Medication Sig    acetaminophen (TYLENOL ARTHRITIS PAIN ORAL) Take by mouth daily as needed.    amlodipine-benazepril 10-20mg (LOTREL) 10-20 mg per capsule Take 1 capsule by mouth once daily.    carbidopa-levodopa  mg (SINEMET)  mg per tablet Take 1 tablet by mouth 3 (three) times daily.    cetirizine (ZYRTEC) 10 MG tablet Take 1 tablet by mouth Daily. 1 Tablet Oral Every day.  To get over-the-counter    memantine (NAMENDA) 10 MG Tab Take 10 mg by mouth 2 (two) times daily.    CONSTULOSE 10 gram/15 mL solution TAKE 15 ML BY MOUTH  THREE TIMES DAILY AS NEEDED (Patient not taking: Reported on 10/14/2024)     Family History       Problem Relation (Age of Onset)    Alzheimer's disease Mother    Cataracts Mother    Colon cancer Father    Diabetes Maternal Aunt, Paternal Grandmother    Hypertension Mother    Macular degeneration Maternal Aunt          Tobacco Use    Smoking status: Former    Smokeless tobacco: Never   Substance and Sexual Activity    Alcohol use:  No    Drug use: No    Sexual activity: Not Currently     Review of Systems   Constitutional:  Negative for chills, diaphoresis, fatigue and fever.   Respiratory:  Negative for cough and shortness of breath.    Cardiovascular:  Negative for chest pain, palpitations and leg swelling.   Genitourinary:  Negative for difficulty urinating and dysuria.   Musculoskeletal:  Positive for arthralgias, gait problem and myalgias. Negative for back pain, joint swelling, neck pain and neck stiffness.   Skin:  Negative for color change and wound.   Neurological:  Negative for dizziness, facial asymmetry, light-headedness and headaches.   All other systems reviewed and are negative.    Objective:     Vital Signs (Most Recent):  Temp: 98 °F (36.7 °C) (10/15/24 0415)  Pulse: 64 (10/15/24 0415)  Resp: 16 (10/15/24 0415)  BP: (!) 154/66 (10/15/24 0519)  SpO2: 98 % (10/15/24 0415) Vital Signs (24h Range):  Temp:  [97 °F (36.1 °C)-98.2 °F (36.8 °C)] 98 °F (36.7 °C)  Pulse:  [56-93] 64  Resp:  [13-19] 16  SpO2:  [97 %-100 %] 98 %  BP: (110-203)/(60-89) 154/66     Weight: 59 kg (130 lb 1.1 oz)  Body mass index is 18.66 kg/m².     Physical Exam  Vitals and nursing note reviewed.   Constitutional:       General: He is awake. He is not in acute distress.     Appearance: Normal appearance. He is well-developed and well-groomed. He is not ill-appearing, toxic-appearing or diaphoretic.      Comments: Elderly male resting comfortably in stretcher in no acute distress   HENT:      Head: Normocephalic and atraumatic.   Eyes:      Extraocular Movements: Extraocular movements intact.      Conjunctiva/sclera: Conjunctivae normal.      Pupils: Pupils are equal, round, and reactive to light.   Cardiovascular:      Rate and Rhythm: Normal rate and regular rhythm.      Pulses: Normal pulses.           Dorsalis pedis pulses are 2+ on the right side and 2+ on the left side.      Heart sounds: Normal heart sounds. No murmur heard.  Pulmonary:      Effort:  Pulmonary effort is normal.      Breath sounds: Normal breath sounds. No decreased breath sounds, wheezing, rhonchi or rales.   Abdominal:      General: Bowel sounds are normal.      Palpations: Abdomen is soft.      Tenderness: There is no abdominal tenderness.   Musculoskeletal:      Cervical back: Normal range of motion and neck supple.      Right lower leg: No edema.      Left lower leg: No edema.      Comments: Limited range of motion of right lower extremity secondary to acute femur fracture noted on imaging.   Skin:     General: Skin is warm and dry.      Capillary Refill: Capillary refill takes less than 2 seconds.   Neurological:      Mental Status: He is alert. Mental status is at baseline.      GCS: GCS eye subscore is 4. GCS verbal subscore is 5. GCS motor subscore is 6.      Cranial Nerves: Cranial nerves 2-12 are intact.   Psychiatric:         Mood and Affect: Mood normal.         Behavior: Behavior normal. Behavior is cooperative.              CRANIAL NERVES     CN III, IV, VI   Pupils are equal, round, and reactive to light.     LABS:  Recent Results (from the past 24 hours)   CBC Auto Differential    Collection Time: 10/14/24 12:00 PM   Result Value Ref Range    WBC 5.74 3.90 - 12.70 K/uL    RBC 3.61 (L) 4.60 - 6.20 M/uL    Hemoglobin 11.1 (L) 14.0 - 18.0 g/dL    Hematocrit 34.0 (L) 40.0 - 54.0 %    MCV 94 82 - 98 fL    MCH 30.7 27.0 - 31.0 pg    MCHC 32.6 32.0 - 36.0 g/dL    RDW 12.2 11.5 - 14.5 %    Platelets 357 150 - 450 K/uL    MPV 10.3 9.2 - 12.9 fL    Immature Granulocytes 0.3 0.0 - 0.5 %    Gran # (ANC) 4.5 1.8 - 7.7 K/uL    Immature Grans (Abs) 0.02 0.00 - 0.04 K/uL    Lymph # 0.8 (L) 1.0 - 4.8 K/uL    Mono # 0.3 0.3 - 1.0 K/uL    Eos # 0.1 0.0 - 0.5 K/uL    Baso # 0.02 0.00 - 0.20 K/uL    nRBC 0 0 /100 WBC    Gran % 78.5 (H) 38.0 - 73.0 %    Lymph % 13.2 (L) 18.0 - 48.0 %    Mono % 5.6 4.0 - 15.0 %    Eosinophil % 2.1 0.0 - 8.0 %    Basophil % 0.3 0.0 - 1.9 %    Differential Method  Automated    Comprehensive Metabolic Panel    Collection Time: 10/14/24 12:00 PM   Result Value Ref Range    Sodium 144 136 - 145 mmol/L    Potassium 3.6 3.5 - 5.1 mmol/L    Chloride 103 95 - 110 mmol/L    CO2 29 23 - 29 mmol/L    Glucose 126 (H) 70 - 110 mg/dL    BUN 21 8 - 23 mg/dL    Creatinine 1.4 0.5 - 1.4 mg/dL    Calcium 9.0 8.7 - 10.5 mg/dL    Total Protein 6.8 6.0 - 8.4 g/dL    Albumin 3.4 (L) 3.5 - 5.2 g/dL    Total Bilirubin 0.5 0.1 - 1.0 mg/dL    Alkaline Phosphatase 169 (H) 55 - 135 U/L    AST 23 10 - 40 U/L    ALT 5 (L) 10 - 44 U/L    eGFR 54 (A) >60 mL/min/1.73 m^2    Anion Gap 12 8 - 16 mmol/L   HIV 1/2 Ag/Ab (4th Gen)    Collection Time: 10/14/24  8:01 PM   Result Value Ref Range    HIV 1/2 Ag/Ab Negative Negative   Hepatitis C Antibody    Collection Time: 10/14/24  8:01 PM   Result Value Ref Range    Hepatitis C Ab Negative Negative   HCV Virus Hold Specimen    Collection Time: 10/14/24  8:01 PM   Result Value Ref Range    HEP C Virus Hold Specimen Hold for HCV sendout    CBC Auto Differential    Collection Time: 10/14/24  8:01 PM   Result Value Ref Range    WBC 7.79 3.90 - 12.70 K/uL    RBC 3.94 (L) 4.60 - 6.20 M/uL    Hemoglobin 12.2 (L) 14.0 - 18.0 g/dL    Hematocrit 35.4 (L) 40.0 - 54.0 %    MCV 90 82 - 98 fL    MCH 31.0 27.0 - 31.0 pg    MCHC 34.5 32.0 - 36.0 g/dL    RDW 12.1 11.5 - 14.5 %    Platelets 367 150 - 450 K/uL    MPV 9.9 9.2 - 12.9 fL    Immature Granulocytes 0.4 0.0 - 0.5 %    Gran # (ANC) 5.7 1.8 - 7.7 K/uL    Immature Grans (Abs) 0.03 0.00 - 0.04 K/uL    Lymph # 1.4 1.0 - 4.8 K/uL    Mono # 0.6 0.3 - 1.0 K/uL    Eos # 0.1 0.0 - 0.5 K/uL    Baso # 0.03 0.00 - 0.20 K/uL    nRBC 0 0 /100 WBC    Gran % 72.7 38.0 - 73.0 %    Lymph % 17.7 (L) 18.0 - 48.0 %    Mono % 7.1 4.0 - 15.0 %    Eosinophil % 1.7 0.0 - 8.0 %    Basophil % 0.4 0.0 - 1.9 %    Differential Method Automated    Comprehensive metabolic panel    Collection Time: 10/14/24  9:10 PM   Result Value Ref Range    Sodium 144  136 - 145 mmol/L    Potassium 3.2 (L) 3.5 - 5.1 mmol/L    Chloride 102 95 - 110 mmol/L    CO2 31 (H) 23 - 29 mmol/L    Glucose 90 70 - 110 mg/dL    BUN 23 8 - 23 mg/dL    Creatinine 1.4 0.5 - 1.4 mg/dL    Calcium 8.8 8.7 - 10.5 mg/dL    Total Protein 6.4 6.0 - 8.4 g/dL    Albumin 3.3 (L) 3.5 - 5.2 g/dL    Total Bilirubin 0.4 0.1 - 1.0 mg/dL    Alkaline Phosphatase 165 (H) 55 - 135 U/L    AST 19 10 - 40 U/L    ALT 10 10 - 44 U/L    eGFR 54 (A) >60 mL/min/1.73 m^2    Anion Gap 11 8 - 16 mmol/L   Urinalysis, Reflex to Urine Culture Urine, Clean Catch    Collection Time: 10/14/24 10:52 PM    Specimen: Urine   Result Value Ref Range    Specimen UA Urine, Clean Catch     Color, UA Yellow Yellow, Straw, Oralia    Appearance, UA Clear Clear    pH, UA 7.0 5.0 - 8.0    Specific Gravity, UA 1.020 1.005 - 1.030    Protein, UA 1+ (A) Negative    Glucose, UA Negative Negative    Ketones, UA Negative Negative    Bilirubin (UA) Negative Negative    Occult Blood UA Negative Negative    Nitrite, UA Negative Negative    Urobilinogen, UA Negative <2.0 EU/dL    Leukocytes, UA Negative Negative   Urinalysis Microscopic    Collection Time: 10/14/24 10:52 PM   Result Value Ref Range    RBC, UA 0 0 - 4 /hpf    WBC, UA 0 0 - 5 /hpf    WBC Clumps, UA None None-Rare    Bacteria None None-Occ /hpf    Hyaline Casts, UA 0 0-1/lpf /lpf    Microscopic Comment SEE COMMENT    Protime-INR    Collection Time: 10/14/24 11:24 PM   Result Value Ref Range    Prothrombin Time 11.0 9.0 - 12.5 sec    INR 1.0 0.8 - 1.2   APTT    Collection Time: 10/14/24 11:24 PM   Result Value Ref Range    aPTT 26.7 21.0 - 32.0 sec   CBC auto differential    Collection Time: 10/15/24  4:29 AM   Result Value Ref Range    WBC 7.67 3.90 - 12.70 K/uL    RBC 3.56 (L) 4.60 - 6.20 M/uL    Hemoglobin 11.0 (L) 14.0 - 18.0 g/dL    Hematocrit 31.7 (L) 40.0 - 54.0 %    MCV 89 82 - 98 fL    MCH 30.9 27.0 - 31.0 pg    MCHC 34.7 32.0 - 36.0 g/dL    RDW 11.9 11.5 - 14.5 %    Platelets 312 150  - 450 K/uL    MPV 9.7 9.2 - 12.9 fL    Immature Granulocytes 0.3 0.0 - 0.5 %    Gran # (ANC) 4.6 1.8 - 7.7 K/uL    Immature Grans (Abs) 0.02 0.00 - 0.04 K/uL    Lymph # 2.2 1.0 - 4.8 K/uL    Mono # 0.6 0.3 - 1.0 K/uL    Eos # 0.3 0.0 - 0.5 K/uL    Baso # 0.03 0.00 - 0.20 K/uL    nRBC 0 0 /100 WBC    Gran % 59.9 38.0 - 73.0 %    Lymph % 28.4 18.0 - 48.0 %    Mono % 7.2 4.0 - 15.0 %    Eosinophil % 3.8 0.0 - 8.0 %    Basophil % 0.4 0.0 - 1.9 %    Differential Method Automated        RADIOLOGY  X-Ray Chest 1 View    Result Date: 10/15/2024  EXAMINATION: XR CHEST 1 VIEW CLINICAL HISTORY: preop; TECHNIQUE: Single frontal view of the chest was performed. COMPARISON: None FINDINGS: Lungs are clear. No focal consolidation. No pleural effusion. No pneumothorax. Normal heart size.     No acute findings. Electronically signed by: Lolly De Leon Date:    10/15/2024 Time:    03:51    X-Ray Lumbar Spine AP And Lateral    Result Date: 10/14/2024  EXAM:  XR LUMBAR SPINE AP AND LATERAL CLINICAL HISTORY: Back pain. FINDINGS: Lumbar vertebral body height and alignment are normal.  No evidence of acute fracture.  Multilevel disc space narrowing and spondylosis.  Moderate facet arthropathy lower lumbar spine.      No acute findings. Finalized on: 10/14/2024 10:45 PM By:  Haroldo Bhagat MD [facility]# [id number]      2024-10-14 22:47:51.139    [facility]    CT Hip Without Contrast Right    Result Date: 10/14/2024  EXAMINATION: CT HIP WITHOUT CONTRAST RIGHT CLINICAL HISTORY: Fracture, hip; TECHNIQUE: 1.25 mm axial noncontrast CT images were obtained through the cervical spine using soft tissue and bony algorithm.  Sagittal coronal reformats were also submitted for interpretation. COMPARISON: Radiograph comparison     Positive acute fracture right femur greater trochanter comminuted minimally displaced.  No joint malalignment. All CT scans at this facility use dose modulation, iterative reconstruction, and/or weight base dosing when appropriate to  reduce radiation dose to as low as reasonably achievable. Electronically signed by: Estefania Cardenas Date:    10/14/2024 Time:    22:45    CT Hip Without Contrast Left    Result Date: 10/14/2024  EXAMINATION: CT HIP WITHOUT CONTRAST LEFT CLINICAL HISTORY: Fracture, hip; TECHNIQUE: 1.25 mm axial noncontrast CT images were obtained through the cervical spine using soft tissue and bony algorithm.  Sagittal coronal reformats were also submitted for interpretation. COMPARISON: Radiographic comparison     Bones are demineralized.  No acute fracture or dislocation identified left hip. Right hip radiographic comparison with nondisplaced inter trochanteric fracture which is not imaged on CT left hip All CT scans at this facility use dose modulation, iterative reconstruction, and/or weight base dosing when appropriate to reduce radiation dose to as low as reasonably achievable. Electronically signed by: Estefania Cardenas Date:    10/14/2024 Time:    21:18    X-Ray Abdomen Flat And Erect    Result Date: 10/14/2024  EXAM: XR ABDOMEN FLAT AND ERECT CLINICAL HISTORY: Abdominal pain. FINDINGS: Mild constipation.  There is no radiographic evidence of small bowel obstruction, ileus, or gross free air.  No radiopaque urinary tract calculi are identified.      No radiographic evidence of acute abdominal disease. Finalized on: 10/14/2024 8:35 PM By:  Haroldo Bhagat MD [facility]# [id number]      2024-10-14 20:38:04.062    [facility]    X-Ray Femur 2 View Right    Result Date: 10/14/2024  EXAM: XR FEMUR 2 VIEW RIGHT CLINICAL HISTORY: Right leg pain COMPARISON: None TECHNIQUE:  AP and lateral views of the right femur were performed including visualization of the hip and knee joints. FINDINGS: There is a linear lucency raising concern for a nondisplaced intratrochanteric proximal right femoral fracture visible on the AP view only.  No other deformities are appreciated.      Question nondisplaced intertrochanteric proximal femoral fracture.   Correlation with clinical findings would be helpful and more sensitive imaging evaluation by CT is recommended as clinically warranted. Finalized on: 10/14/2024 4:31 PM By:  Tam Ferrera [facility]# [id number]      2024-10-14 16:34:00.196    [facility]    X-Ray Sacrum And Coccyx    Result Date: 10/14/2024  EXAM: XR SACRUM AND COCCYX CLINICAL HISTORY: Pain FINDINGS:  Standard frontal and lateral views of the sacrum and coccyx were performed.  No deformity or abnormal lucency suggesting fracture is identified.      No evidence of acute or recent injury. Finalized on: 10/14/2024 4:29 PM By:  Tam Ferrera [facility]# [id number]      2024-10-14 16:31:40.009    [facility]      EKG    MICROBIOLOGY    MDM     Amount and/or Complexity of Data Reviewed  Clinical lab tests: reviewed  Tests in the radiology section of CPT®: reviewed  Tests in the medicine section of CPT®: reviewed  Discussion of test results with the performing providers: yes  Decide to obtain previous medical records or to obtain history from someone other than the patient: yes  Obtain history from someone other than the patient: yes  Review and summarize past medical records: yes  Discuss the patient with other providers: yes  Independent visualization of images, tracings, or specimens: yes        Assessment/Plan:     * Closed displaced intertrochanteric fracture of right femur  RCRI revealed class 1 risk with 3.9% 30 day risk of death, mi, cardiac arrest.  Ortho consulted for surgical repair in a.m..  Plan:  -NPO  -Continue current pain regimen, titrate as needed  -Bowel regimen  -Bedrest  -None weightbearing RLE  -Continue splint per ortho recs  -IVFs prn  -Antiemetics prn  -Tylenol as needed for fever   -PT/OT   -preop labs and imaging      HTN (hypertension)  Patients blood pressure range in the last 24 hours was: BP  Min: 110/60  Max: 203/88.The patient's inpatient anti-hypertensive regimen is listed below:  Current Antihypertensives  hydrALAZINE injection 10 mg, Every 6  hours PRN, Intravenous    Plan  - BP is controlled, no changes needed to their regimen      Alzheimer's dementia  Patient with dementia with likely etiology of alzheimer's dementia. Dementia is mild. The patient does not have signs of behavioral disturbance. Home dementia medications are Held or Continued: continued.. Continue non-pharmacologic interventions to prevent delirium (No VS between 11PM-5AM, activity during day, opening blinds, providing glasses/hearing aids, and up in chair during daytime). Will avoid narcotics and benzos unless absolutely necessary. PRN anti-psychotics are not prescribed to avoid self harm behaviors.    Anemia  Anemia is likely due to  chronic age related changes . Most recent hemoglobin and hematocrit are listed below.  Recent Labs     10/14/24  1200 10/14/24  2001 10/15/24  0429   HGB 11.1* 12.2* 11.0*   HCT 34.0* 35.4* 31.7*     Plan  - Monitor serial CBC: Daily  - Transfuse PRBC if patient becomes hemodynamically unstable, symptomatic or H/H drops below 7/21.  - Patient has not received any PRBC transfusions to date  - Patient's anemia is currently stable      Dyslipidemia  Patient is not chronically on statin.will not continue for now. Last Lipid Panel:   Lab Results   Component Value Date    CHOL 214 (H) 08/13/2024    HDL 71 08/13/2024    LDLCALC 127.8 08/13/2024    TRIG 76 08/13/2024    CHOLHDL 33.2 08/13/2024     Plan:  -low fat/low calorie diet          VTE Risk Mitigation (From admission, onward)           Ordered     Reason for No Pharmacological VTE Prophylaxis  Once        Comments: Planned surgical procedure   Question:  Reasons:  Answer:  Physician Provided (leave comment)    10/14/24 2316     IP VTE HIGH RISK PATIENT  Once         10/14/24 2316     Place sequential compression device  Until discontinued         10/14/24 2316                  //Core Measures   -DVT proph: SCDs, withholding anticoagulation pending surgical intervention   -Code status DNR     -Surrogate:son    Components of this note were documented using a voice recognition system and are subject to errors not corrected at the time the document was proof read. Please contact the author for any clarifications.        Mc Alegria NP  Department of Hospital Medicine  [facility] Tele[facility] ([facility])

## 2024-10-15 NOTE — PLAN OF CARE
POC reviewed with pt. Pt verbalizes understanding of POC. No questions at this time.  AAOx3, disoriented to time. NADN.  Not on cardiac monitor. Most recent bp 188/76.  Pt remains free of falls.  No complaints at this time.  Safety measures in place. Will continue to monitor.  Informed pt to call for assistance before getting up. Pt verbalizes understanding.  Hourly rounding and chart check complete.

## 2024-10-15 NOTE — NURSING TRANSFER
Nursing Transfer Note      10/15/2024   12:35 AM    Nurse giving handoff:Maryse  Nurse receiving handoff:Saba    Reason patient is being transferred: admission femur fracture    Transfer To: med/tele    Transfer via stretcher    Transfer with nothing    Transported by CNA    Transfer Vital Signs:  Blood Pressure:191/89  Heart Rate:60  O2:98  Temperature:98.1  Respirations:15    Telemetry:   Order for Tele Monitor? No    Additional Lines:     Medicines sent: none    Any special needs or follow-up needed: no    Patient belongings transferred with patient: Yes    Chart send with patient: Yes    Notified: friend    Patient reassessed at: 0000 10/15/2024 (date, time)  1  Upon arrival to floor: patient oriented to room, call bell in reach, and bed in lowest position

## 2024-10-15 NOTE — PROGRESS NOTES
[facility] Medicine  Progress Note    Patient Name: Jose Long  MRN: [id number]  Patient Class: IP- Inpatient   Admission Date: 10/14/2024  Length of Stay: 1 days  Attending Physician: Gerard Khanna MD  Primary Care Provider: Harshad Hernandez MD        Subjective:     Principal Problem:Closed displaced intertrochanteric fracture of right femur        HPI:  Jose Long is a 68 y.o. male with a PMH  has a past medical history of Alzheimer's dementia, Dyslipidemia, Hypertension, and Multiple allergies.presented to the ED due to right hip pain following a fall about a week ago. Pt's son reports that the pt has fallen about once a week for the past month. Pt's sxs are constant and moderate in severity. Per the son, the pt was able to get up from the fall, but now needs assistance to get around and walk unlike before.  Patient was evaluated by his PCP earlier yesterday and had plain film imaging performed.  Patient was notified to proceed to the ER for further evaluation for possible right femur fracture.  Reports only mild discomfort in right hip.  Denies any other complaints at this time.  Pt has parkinson's, dementia, and alzheimer's which effect his mental status and fatigue regularly with exertion.     ER workup revealed CBC to be unremarkable.  CMP revealed potassium of 3.2.  UA unremarkable.  Plain film imaging of sacrum and lumbar spine negative for acute findings.  Plain film imaging of right femur revealed questionable nondisplaced intertrochanteric proximal femoral fracture.  CTA head without contrast revealed  Positive acute fracture right femur greater trochanter comminuted minimally displaced.  No joint malalignment.  Orthopedic provider consulted.  Recommend Hospital Medicine to admit for surgical repair in a.m..  Patient and family member in agreement with treatment plan.  Patient will be admitted under inpatient status.      PCP: Harshad Hernandez  P.      Overview/Hospital Course:  The patient is a 67 yo male with Parkinson's disease, HTN, and mild dementia who was admitted with a right hip closed minimally displaced greater trochanteric hip fracture. Orthopedics was consulted and does not plan for surgery today. She will be reviewing films with her colleagues and make determination after. She recommended WBAT. Pt does have hx of several falls in last few weeks. He has parkinson's disease and would benefit from inpatient rehab. His neighbor only provides intermittent supervision. According to PT/OT notes, pt requires 24 assistance and requires moderate assistance for most activities. Speech therapy evaluated pt and recommends aspiration precautions and continued speech therapy.     Interval History: ortho recommended WBAT. Pt does have hx of several falls in last few weeks. He has parkinson's disease and would benefit from inpatient rehab. His neighbor only provides intermittent supervision. According to PT/OT notes, pt requires 24 assistance and requires moderate assistance for most activities. Speech therapy evaluated pt and recommends aspiration precautions and continued speech therapy.     Review of Systems   Constitutional:  Positive for activity change and appetite change. Negative for chills, diaphoresis, fatigue and fever.   HENT:  Negative for congestion, nosebleeds, sore throat and trouble swallowing.    Eyes:  Negative for pain, discharge and visual disturbance.   Respiratory:  Negative for apnea, cough, chest tightness, shortness of breath, wheezing and stridor.    Cardiovascular:  Negative for chest pain, palpitations and leg swelling.   Gastrointestinal:  Negative for abdominal distention, abdominal pain, blood in stool, constipation, diarrhea, nausea and vomiting.   Endocrine: Negative for cold intolerance and heat intolerance.   Genitourinary:  Negative for difficulty urinating, dysuria, flank pain, frequency and urgency.   Musculoskeletal:   Positive for arthralgias (right hip pain) and gait problem. Negative for back pain, joint swelling, myalgias, neck pain and neck stiffness.   Skin:  Negative for rash and wound.   Allergic/Immunologic: Negative for food allergies and immunocompromised state.   Neurological:  Negative for dizziness, seizures, syncope, facial asymmetry, weakness, light-headedness and headaches.   Hematological:  Negative for adenopathy.   Psychiatric/Behavioral:  Negative for agitation, behavioral problems and confusion. The patient is not nervous/anxious.      Objective:     Vital Signs (Most Recent):  Temp: 97.6 °F (36.4 °C) (10/15/24 1233)  Pulse: 94 (10/15/24 1233)  Resp: 18 (10/15/24 1233)  BP: 129/64 (10/15/24 1233)  SpO2: 97 % (10/15/24 1233) Vital Signs (24h Range):  Temp:  [97.6 °F (36.4 °C)-98.2 °F (36.8 °C)] 97.6 °F (36.4 °C)  Pulse:  [56-94] 94  Resp:  [13-19] 18  SpO2:  [96 %-100 %] 97 %  BP: (129-203)/(64-89) 129/64     Weight: 59 kg (130 lb 1.1 oz)  Body mass index is 18.66 kg/m².    Intake/Output Summary (Last 24 hours) at 10/15/2024 1549  Last data filed at 10/15/2024 1320  Gross per 24 hour   Intake 240 ml   Output 350 ml   Net -110 ml         Physical Exam  Vitals and nursing note reviewed.   Constitutional:       General: He is not in acute distress.     Appearance: He is well-developed. He is not diaphoretic.   HENT:      Head: Normocephalic and atraumatic.      Nose: Nose normal.   Eyes:      General: No scleral icterus.     Conjunctiva/sclera: Conjunctivae normal.   Cardiovascular:      Rate and Rhythm: Normal rate and regular rhythm.      Heart sounds: Normal heart sounds. No murmur heard.     No friction rub. No gallop.   Pulmonary:      Effort: Pulmonary effort is normal. No respiratory distress.      Breath sounds: Normal breath sounds. No stridor. No wheezing or rales.   Chest:      Chest wall: No tenderness.   Abdominal:      General: Bowel sounds are normal. There is no distension.      Palpations:  Abdomen is soft.      Tenderness: There is no abdominal tenderness. There is no guarding or rebound.   Musculoskeletal:         General: Tenderness (right hip) present. No deformity. Normal range of motion.      Cervical back: Normal range of motion and neck supple.   Skin:     General: Skin is warm and dry.      Coloration: Skin is not pale.      Findings: No erythema or rash.   Neurological:      Mental Status: He is alert and oriented to person, place, and time.      Cranial Nerves: No cranial nerve deficit.      Motor: No abnormal muscle tone.      Coordination: Coordination normal.      Deep Tendon Reflexes: Reflexes are normal and symmetric.   Psychiatric:         Behavior: Behavior normal.         Thought Content: Thought content normal.         Cognition and Memory: Cognition is impaired.             Significant Labs: All pertinent labs within the past 24 hours have been reviewed.    Significant Imaging: I have reviewed all pertinent imaging results/findings within the past 24 hours.    Assessment/Plan:      * Closed displaced intertrochanteric fracture of right femur  RCRI revealed class 1 risk with 3.9% 30 day risk of death, mi, cardiac arrest.  Ortho consulted for surgical repair in a.m..  Plan:  -NPO  -Continue current pain regimen, titrate as needed  -Bowel regimen  -Bedrest  -None weightbearing RLE  -Continue splint per ortho recs  -IVFs prn  -Antiemetics prn  -Tylenol as needed for fever   -PT/OT   -preop labs and imaging    10/15/24: Orthopedics was consulted and does not plan for surgery today. She will be reviewing films with her colleagues and make determination after. She recommended WBAT. Pt does have hx of several falls in last few weeks. He has parkinson's disease and would benefit from inpatient rehab. His neighbor only provides intermittent supervision. According to PT/OT notes, pt requires 24 assistance and requires moderate assistance for most activities. Speech therapy evaluated pt and  recommends aspiration precautions and continued speech therapy.     Falls frequently  PT/OT   Rehab consult       Anemia  Anemia is likely due to  chronic age related changes . Most recent hemoglobin and hematocrit are listed below.  Recent Labs     10/14/24  1200 10/14/24  2001 10/15/24  0429   HGB 11.1* 12.2* 11.0*   HCT 34.0* 35.4* 31.7*     Plan  - Monitor serial CBC: Daily  - Transfuse PRBC if patient becomes hemodynamically unstable, symptomatic or H/H drops below 7/21.  - Patient has not received any PRBC transfusions to date  - Patient's anemia is currently stable      Parkinson disease  Cont Sinemet       Alzheimer's dementia  Patient with dementia with likely etiology of alzheimer's dementia. Dementia is mild. The patient does not have signs of behavioral disturbance. Home dementia medications are Held or Continued: continued.. Continue non-pharmacologic interventions to prevent delirium (No VS between 11PM-5AM, activity during day, opening blinds, providing glasses/hearing aids, and up in chair during daytime). Will avoid narcotics and benzos unless absolutely necessary. PRN anti-psychotics are not prescribed to avoid self harm behaviors.    Dyslipidemia  Patient is not chronically on statin.will not continue for now. Last Lipid Panel:   Lab Results   Component Value Date    CHOL 214 (H) 08/13/2024    HDL 71 08/13/2024    LDLCALC 127.8 08/13/2024    TRIG 76 08/13/2024    CHOLHDL 33.2 08/13/2024     Plan:  -low fat/low calorie diet        HTN (hypertension)  Patients blood pressure range in the last 24 hours was: BP  Min: 110/60  Max: 203/88.The patient's inpatient anti-hypertensive regimen is listed below:  Current Antihypertensives  hydrALAZINE injection 10 mg, Every 6 hours PRN, Intravenous    Plan  - BP is controlled, no changes needed to their regimen        VTE Risk Mitigation (From admission, onward)           Ordered     Reason for No Pharmacological VTE Prophylaxis  Once        Comments: Planned  surgical procedure   Question:  Reasons:  Answer:  Physician Provided (leave comment)    10/14/24 2316     IP VTE HIGH RISK PATIENT  Once         10/14/24 2316     Place sequential compression device  Until discontinued         10/14/24 2316                    Discharge Planning   NATHAN:      Code Status: DNR   Is the patient medically ready for discharge?:     Reason for patient still in hospital (select all that apply): Patient trending condition  Discharge Plan A: Home Health                  Narcisa Alba NP  Department of Hospital Medicine   [facility] - Telemetry ([facility])

## 2024-10-15 NOTE — ASSESSMENT & PLAN NOTE
Patients blood pressure range in the last 24 hours was: BP  Min: 110/60  Max: 203/88.The patient's inpatient anti-hypertensive regimen is listed below:  Current Antihypertensives  hydrALAZINE injection 10 mg, Every 6 hours PRN, Intravenous    Plan  - BP is controlled, no changes needed to their regimen

## 2024-10-15 NOTE — PROGRESS NOTES
CT scan re-reviewed.    Confident fracture is only of greater trochanter.    WBAT right leg  Will follow as inpatient and arrange close f/u as outpatient

## 2024-10-15 NOTE — PLAN OF CARE
PT AAOx4, but has dementia and then is AAOx2-3. Patient had speech/swallow eval done. LR @100ml/hr maintained. Patient is no longer needing surgery on his fracture instead Ortho is recommending rehab as bone is in place and just needs to heal. Patient was given Miralax PRN to help with constipation. Weight bearing as tolerated initiated and maintained. No acute changes, will continue to monitor.     Problem: Skin Injury Risk Increased  Goal: Skin Health and Integrity  Outcome: Progressing     Problem: Adult Inpatient Plan of Care  Goal: Plan of Care Review  Outcome: Progressing  Goal: Patient-Specific Goal (Individualized)  Outcome: Progressing  Goal: Absence of Hospital-Acquired Illness or Injury  Outcome: Progressing  Goal: Optimal Comfort and Wellbeing  Outcome: Progressing  Goal: Readiness for Transition of Care  Outcome: Progressing     Problem: Fall Injury Risk  Goal: Absence of Fall and Fall-Related Injury  Outcome: Progressing

## 2024-10-15 NOTE — HOSPITAL COURSE
The patient is a 67 yo male with Parkinson's disease, HTN, and mild dementia who was admitted with a right hip closed minimally displaced greater trochanteric hip fracture. Orthopedics was consulted and does not plan surgery. She recommended WBAT. OK to discharge from Ortho standpoint.   Pt does have hx of several falls in last few weeks. He has parkinson's disease and would benefit from inpatient rehab. His neighbor only provides intermittent supervision. According to PT/OT notes, pt requires 24 assistance and requires moderate assistance for most activities. Speech therapy evaluated pt and recommends aspiration precautions and continued speech therapy. CM consulted for inpatient rehab placement and pt was accepted at  Rehab. The patient was seen and examined today and determined to be stable for discharge.

## 2024-10-15 NOTE — HPI
Jose Long is a 68 y.o. male with a PMH  has a past medical history of Alzheimer's dementia, Dyslipidemia, Hypertension, and Multiple allergies.presented to the ED due to right hip pain following a fall about a week ago. Pt's son reports that the pt has fallen about once a week for the past month. Pt's sxs are constant and moderate in severity. Per the son, the pt was able to get up from the fall, but now needs assistance to get around and walk unlike before.  Patient was evaluated by his PCP earlier yesterday and had plain film imaging performed.  Patient was notified to proceed to the ER for further evaluation for possible right femur fracture.  Reports only mild discomfort in right hip.  Denies any other complaints at this time.  Pt has parkinson's, dementia, and alzheimer's which effect his mental status and fatigue regularly with exertion.     ER workup revealed CBC to be unremarkable.  CMP revealed potassium of 3.2.  UA unremarkable.  Plain film imaging of sacrum and lumbar spine negative for acute findings.  Plain film imaging of right femur revealed questionable nondisplaced intertrochanteric proximal femoral fracture.  CTA head without contrast revealed  Positive acute fracture right femur greater trochanter comminuted minimally displaced.  No joint malalignment.  Orthopedic provider consulted.  Recommend Hospital Medicine to admit for surgical repair in a.m..  Patient and family member in agreement with treatment plan.  Patient will be admitted under inpatient status.      PCP: Harshad Hernandez

## 2024-10-15 NOTE — PT/OT/SLP EVAL
Physical Therapy Evaluation    Patient Name:  Jose Long   MRN:  [id number]    Recommendations:     Discharge Recommendations: Low Intensity Therapy (24 HOUR CAREGIVERS)   Discharge Equipment Recommendations: wheelchair, hospital bed   Barriers to discharge: Decreased caregiver support?     Assessment:     Jose Long is a 68 y.o. male admitted with a medical diagnosis of Closed displaced intertrochanteric fracture of right femur.  He presents with the following impairments/functional limitations: weakness, impaired self care skills, impaired functional mobility, gait instability, impaired balance, decreased ROM, decreased coordination, decreased lower extremity function, decreased safety awareness, pain, impaired coordination, impaired cognition, impaired endurance, impaired muscle length, orthopedic precautions .    Rehab Prognosis: Fair; patient would benefit from acute skilled PT services to address these deficits and reach maximum level of function.    Recent Surgery: * No surgery found *      Plan:     During this hospitalization, patient to be seen 3 x/week to address the identified rehab impairments via gait training, therapeutic activities, therapeutic exercises and progress toward the following goals:    Plan of Care Expires:  10/29/24    Subjective     Chief Complaint: pain   Patient/Family Comments/goals: RETURN HOME   Pain/Comfort:  Pain Rating 1: 5/10  Location - Side 1: Right  Location 1: hip  Pain Rating Post-Intervention 1: 5/10    Patients cultural, spiritual, Mu-ism conflicts given the current situation:      Living Environment:  PT LIVES AT HOME ALONE HOWEVER HIS NEIGHBOR COMES AND STAYS WITH HIM SOME DURING THE DAY AND AT NIGHT. PT LIVES IN A ONE STORY HOME WITH NO STEPS TO ENTER HOME .  Prior to admission, patients level of function was ASSIST WITH WALKING WITH RW SHORT HH DISTANCES.  Equipment used at home: walker, rolling, shower chair.  DME owned (not currently used): none.  Upon  "discharge, patient will have assistance from NEIGHBOR HOWEVER NEEDS 24/7 CARE.    Objective:     Communicated with NURSE AND EPIC CHART REVIEW  prior to session.  Patient found supine with telemetry  upon PT entry to room.    General Precautions: Standard, fall  Orthopedic Precautions:RLE weight bearing as tolerated   Braces: N/A  Respiratory Status: Room air    Exams:  Cognitive Exam:  Patient is oriented to Person and Situation  Postural Exam:  Patient presented with the following abnormalities:    -       Lateral weight shift of hips  RLE ROM: IMPAIRED   RLE Strength: NA D/T PAIN  LLE ROM: WFL  LLE Strength: WFL    Functional Mobility:  Bed Mobility:     Rolling Left:  moderate assistance  Scooting: moderate assistance  Supine to Sit: moderate assistance  Transfers:     Sit to Stand:  moderate assistance with rolling walker  Bed to Chair: moderate assistance with  rolling walker  using  Stand Pivot  Gait: PT GT TRAINED X 20' WITH RW AND MIN A WITH STEP TO GT AND CUES FOR RW MANAGEMENT      AM-PAC 6 CLICK MOBILITY  Total Score:12       Treatment & Education:  GT. BELT AND  SOCKS DONNED PRIOR TO OOB MOBILITY.  PT STOOD WITH RW AND MOD A FOR GT TRAINING. PT WITH STEP TO GT AND RETURNED TO RM T/F TO BEDSIDE CHAIR WITH MODA. PT SEATED IN CHAIR AND EDUCATED ON ROLE OF P.T. IN ACUTE CARE AND IMPORTANCE OF OOB TO CHAIR. P.T. DISCUSSED NEED FOR 24/7 ASSISTANCE FOR PT AT HOME WITH NEIGHBOR.  PT EDUCATED ON CALL BELL USE. PT EDUCATED ON "CALL DON'T FALL", ENCOURAGED TO CALL FOR ASSISTANCE WITH ALL NEEDS FOR OOB MOBILITY.      Patient left up in chair with call button in reach, chair alarm on, and NEIGHBOR  present.    GOALS:   Multidisciplinary Problems       Physical Therapy Goals          Problem: Physical Therapy    Goal Priority Disciplines Outcome Interventions   Physical Therapy Goal     PT, PT/OT     Description: LTG: 10/29/24  1. PT WILL COMPLETE BED MOBILITY WITH ABNER   2. PT WILL STAND T/F TO CHAIR WITH RW AND " MIN A  3. PT WILL GT TRAIN X 50' WITH RW AND CGA TO PROGRESS GT.  4. PT WILL INC AMPAC SCORE BY 2 POINTS TO PROGRESS GROSS FUNC MOBILITY.                          History:     Past Medical History:   Diagnosis Date    Alzheimer's dementia     Dyslipidemia     Hypertension     Multiple allergies        Past Surgical History:   Procedure Laterality Date    COLONOSCOPY N/A 6/29/2018    Procedure: COLONOSCOPY;  Surgeon: Kermit Leone MD;  [facility];  Service: Endoscopy;  Laterality: N/A;    COLONOSCOPY N/A 5/9/2023    Procedure: COLONOSCOPY;  Surgeon: aJzmin Kraus MD;  Location: [facility];  Service: Endoscopy;  Laterality: N/A;       Time Tracking:     PT Received On: 10/15/24  PT Start Time: 1135     PT Stop Time: 1200  PT Total Time (min): 25 min     Billable Minutes: Evaluation 15 and Therapeutic Activity 10      10/15/2024

## 2024-10-15 NOTE — ASSESSMENT & PLAN NOTE
RCRI revealed class 1 risk with 3.9% 30 day risk of death, mi, cardiac arrest.  Ortho consulted for surgical repair in a.m..  Plan:  -NPO  -Continue current pain regimen, titrate as needed  -Bowel regimen  -Bedrest  -None weightbearing RLE  -Continue splint per ortho recs  -IVFs prn  -Antiemetics prn  -Tylenol as needed for fever   -PT/OT   -preop labs and imaging

## 2024-10-15 NOTE — ASSESSMENT & PLAN NOTE
RCRI revealed class 1 risk with 3.9% 30 day risk of death, mi, cardiac arrest.  Ortho consulted for surgical repair in a.m..  Plan:  -NPO  -Continue current pain regimen, titrate as needed  -Bowel regimen  -Bedrest  -None weightbearing RLE  -Continue splint per ortho recs  -IVFs prn  -Antiemetics prn  -Tylenol as needed for fever   -PT/OT   -preop labs and imaging    10/15/24: Orthopedics was consulted and does not plan for surgery today. She will be reviewing films with her colleagues and make determination after. She recommended WBAT. Pt does have hx of several falls in last few weeks. He has parkinson's disease and would benefit from inpatient rehab. His neighbor only provides intermittent supervision. According to PT/OT notes, pt requires 24 assistance and requires moderate assistance for most activities. Speech therapy evaluated pt and recommends aspiration precautions and continued speech therapy.

## 2024-10-15 NOTE — TELEPHONE ENCOUNTER
----- Message from Cheikh Zamora PA-C sent at 10/15/2024  1:46 PM CDT -----  Regarding: RE: New Patient Appoointment  Patient is currently in the hospital and we are consulted.  I'll let you know when I have a better idea of when we will want to see him in clinic  ----- Message -----  From: Antoinette Carreno MA  Sent: 10/15/2024  12:26 PM CDT  To: Antoinette Carreno MA; Cheikh Zamora PA-C  Subject: FW: New Patient Appoointment                     When should patient follow up in clinic?  ----- Message -----  From: Tri Erwin  Sent: 10/15/2024   8:16 AM CDT  To: [facility] - Ortho Trauma  Subject: FW: New Patient Appoointment                       ----- Message -----  From: Tarsha Vega  Sent: 10/14/2024   5:06 PM CDT  To: [facility] Clinical Staff  Subject: New Patient Appoointment                         Good Afternoon, Dr. Harshad Hernandez is requesting appointment tomorrow 10/15/24  has referral/consult for Closed fracture of right femur, unspecified fracture morphology, unspecified por...Please call [phone]. Thanks/elr

## 2024-10-15 NOTE — CONSULTS
DATE OF INJURY  Somewhere between 3 days and 3 weeks ago       CC     Right hip pain     HPI     Jose Long is a 68 y.o. male I am asked to see regarding his right hip pain.  His friend  and neighbor Ray is at his bedside.    He reports he has had several falls in the last couple of weeks.     Lives alone although his friend has been staying with him some recently. He normally uses a walker but has been getting fatigued recently.       Patient Active Problem List   Diagnosis    HTN (hypertension)    Hypertension    Multiple allergies    Dyslipidemia    Personal history of colonic polyps    Colon cancer screening    Special screening for malignant neoplasms, colon    Colon polyp    Bradycardia    Memory changes    Alzheimer's dementia    Parkinson's disease    Anemia    Chronic kidney disease, stage 3a    Dementia    Other constipation    Falls frequently    Weakness    Closed displaced intertrochanteric fracture of right femur         PAST MEDICAL HISTORY   Past Medical History:   Diagnosis Date    Alzheimer's dementia     Dyslipidemia     Hypertension     Multiple allergies             PAST SURGICAL HISTORY      Past Surgical History:   Procedure Laterality Date    COLONOSCOPY N/A 6/29/2018    Procedure: COLONOSCOPY;  Surgeon: Kermit Leone MD;  Location: [facility];  Service: Endoscopy;  Laterality: N/A;    COLONOSCOPY N/A 5/9/2023    Procedure: COLONOSCOPY;  Surgeon: Jazmin Kraus MD;  Location: [facility];  Service: Endoscopy;  Laterality: N/A;          ALLERGIES      Review of patient's allergies indicates:  No Known Allergies         MEDICATIONS    Current Outpatient Medications   Medication Instructions    acetaminophen (TYLENOL ARTHRITIS PAIN ORAL) Daily PRN    amlodipine-benazepril 10-20mg (LOTREL) 10-20 mg per capsule 1 capsule, Oral, Daily    carbidopa-levodopa  mg (SINEMET)  mg per tablet 1 tablet, 3 times daily    cetirizine (ZYRTEC) 10 MG tablet 1 tablet, Daily    CONSTULOSE  "10 gram/15 mL solution TAKE 15 ML BY MOUTH  THREE TIMES DAILY AS NEEDED    memantine (NAMENDA) 10 mg, 2 times daily        SOCIAL HISTORY      Social History     Occupational History    Not on file   Tobacco Use    Smoking status: Former    Smokeless tobacco: Never   Substance and Sexual Activity    Alcohol use: No    Drug use: No    Sexual activity: Not Currently            FAMILY HISTORY   Family History   Problem Relation Name Age of Onset    Cataracts Mother      Hypertension Mother      Alzheimer's disease Mother      Diabetes Maternal Aunt      Macular degeneration Maternal Aunt      Diabetes Paternal Grandmother      Colon cancer Father      Melanoma Neg Hx                 PHYSICAL EXAMINATION  Vitals:    10/14/24 1849 10/14/24 1924 10/14/24 1933 10/14/24 1948   BP: 135/75  (!) 174/75    Pulse: 76 78 69    Resp: 14  13    Temp: 97.7 °F (36.5 °C)      TempSrc: Oral      SpO2: 100%  97%    Weight:    58.2 kg (128 lb 4.9 oz)   Height:        10/14/24 2111 10/14/24 2203 10/14/24 2318 10/14/24 2332   BP: (!) 193/86 (!) 179/83 (!) 203/88 (!) 173/88   Pulse: 66 65 65 60   Resp: 18 19 18    Temp: 98.2 °F (36.8 °C)      TempSrc: Oral      SpO2: 99% 98% 98% 98%   Weight:       Height:        10/14/24 2358 10/14/24 2359 10/15/24 0410 10/15/24 0415   BP: (!) 191/89  (!) 189/84 (!) 188/76   Pulse: 60  (!) 56 64   Resp: 15  17 16   Temp: 98.1 °F (36.7 °C)  98.1 °F (36.7 °C) 98 °F (36.7 °C)   TempSrc: Oral   Oral   SpO2: 98%  98% 98%   Weight:  59 kg (130 lb 1.1 oz)     Height:  5' 10" (1.778 m)      10/15/24 0519 10/15/24 0834 10/15/24 0850   BP: (!) 154/66  (!) 161/74   Pulse:   77   Resp:   18   Temp:   98 °F (36.7 °C)   TempSrc:   Oral   SpO2:  98% 96%   Weight:      Height:            GEN            NAD,  frail appearing     PSYCH       A&Ox3, pleasant and cooperative    STATION  In bed      Right HIP:    Skin intact.  Negative ecchymoses.  Positive swelling.  Thigh/calf soft.  Positive tenderness over greater " trochanter, none over anterior hip. AROM flexes hip to 45 degrees easily.  No pain with log rolling.  Sensation Intact.  EHL strength  5/5.  Ankle plantar/dorsiflexion strength  5/5.  Cap refill <2s.        DIAGNOSTIC IMAGING  Right Femur  XR: Several Views personally reviewed.  Appears to have comminution of the greater trochanter    RIGHT hip CT Scan  Comminuted fracture of the greater trochanter - on a couple of slices appears to have basicervical component but not on all slices.     LAB   11/31.7     DISCUSSION  Diagnostic imaging, clinical findings, and patient's symptoms reviewed and correlated.  Discussed I do not think surgery is indicated but want to re-review the CT scan.  Discussed he may be OOB with PT/OT and WBAT.    Patient given an opportunity to ask questions.  When his questions were answered, he agreed with the plan noted below.       DIAGNOSIS:  Right hip greater trochanteric fracture, closed, minimally displaced  Parkinson's disease  Falls             PLAN  WBAT right leg  PT/OT consults placed  Ortho Trauma will follow while inpt

## 2024-10-15 NOTE — SUBJECTIVE & OBJECTIVE
Interval History: ortho recommended WBAT. Pt does have hx of several falls in last few weeks. He has parkinson's disease and would benefit from inpatient rehab. His neighbor only provides intermittent supervision. According to PT/OT notes, pt requires 24 assistance and requires moderate assistance for most activities. Speech therapy evaluated pt and recommends aspiration precautions and continued speech therapy.     Review of Systems   Constitutional:  Positive for activity change and appetite change. Negative for chills, diaphoresis, fatigue and fever.   HENT:  Negative for congestion, nosebleeds, sore throat and trouble swallowing.    Eyes:  Negative for pain, discharge and visual disturbance.   Respiratory:  Negative for apnea, cough, chest tightness, shortness of breath, wheezing and stridor.    Cardiovascular:  Negative for chest pain, palpitations and leg swelling.   Gastrointestinal:  Negative for abdominal distention, abdominal pain, blood in stool, constipation, diarrhea, nausea and vomiting.   Endocrine: Negative for cold intolerance and heat intolerance.   Genitourinary:  Negative for difficulty urinating, dysuria, flank pain, frequency and urgency.   Musculoskeletal:  Positive for arthralgias (right hip pain) and gait problem. Negative for back pain, joint swelling, myalgias, neck pain and neck stiffness.   Skin:  Negative for rash and wound.   Allergic/Immunologic: Negative for food allergies and immunocompromised state.   Neurological:  Negative for dizziness, seizures, syncope, facial asymmetry, weakness, light-headedness and headaches.   Hematological:  Negative for adenopathy.   Psychiatric/Behavioral:  Negative for agitation, behavioral problems and confusion. The patient is not nervous/anxious.      Objective:     Vital Signs (Most Recent):  Temp: 97.6 °F (36.4 °C) (10/15/24 1233)  Pulse: 94 (10/15/24 1233)  Resp: 18 (10/15/24 1233)  BP: 129/64 (10/15/24 1233)  SpO2: 97 % (10/15/24 1233) Vital  Signs (24h Range):  Temp:  [97.6 °F (36.4 °C)-98.2 °F (36.8 °C)] 97.6 °F (36.4 °C)  Pulse:  [56-94] 94  Resp:  [13-19] 18  SpO2:  [96 %-100 %] 97 %  BP: (129-203)/(64-89) 129/64     Weight: 59 kg (130 lb 1.1 oz)  Body mass index is 18.66 kg/m².    Intake/Output Summary (Last 24 hours) at 10/15/2024 1549  Last data filed at 10/15/2024 1320  Gross per 24 hour   Intake 240 ml   Output 350 ml   Net -110 ml         Physical Exam  Vitals and nursing note reviewed.   Constitutional:       General: He is not in acute distress.     Appearance: He is well-developed. He is not diaphoretic.   HENT:      Head: Normocephalic and atraumatic.      Nose: Nose normal.   Eyes:      General: No scleral icterus.     Conjunctiva/sclera: Conjunctivae normal.   Cardiovascular:      Rate and Rhythm: Normal rate and regular rhythm.      Heart sounds: Normal heart sounds. No murmur heard.     No friction rub. No gallop.   Pulmonary:      Effort: Pulmonary effort is normal. No respiratory distress.      Breath sounds: Normal breath sounds. No stridor. No wheezing or rales.   Chest:      Chest wall: No tenderness.   Abdominal:      General: Bowel sounds are normal. There is no distension.      Palpations: Abdomen is soft.      Tenderness: There is no abdominal tenderness. There is no guarding or rebound.   Musculoskeletal:         General: Tenderness (right hip) present. No deformity. Normal range of motion.      Cervical back: Normal range of motion and neck supple.   Skin:     General: Skin is warm and dry.      Coloration: Skin is not pale.      Findings: No erythema or rash.   Neurological:      Mental Status: He is alert and oriented to person, place, and time.      Cranial Nerves: No cranial nerve deficit.      Motor: No abnormal muscle tone.      Coordination: Coordination normal.      Deep Tendon Reflexes: Reflexes are normal and symmetric.   Psychiatric:         Behavior: Behavior normal.         Thought Content: Thought content  normal.         Cognition and Memory: Cognition is impaired.             Significant Labs: All pertinent labs within the past 24 hours have been reviewed.    Significant Imaging: I have reviewed all pertinent imaging results/findings within the past 24 hours.

## 2024-10-15 NOTE — TELEPHONE ENCOUNTER
Ms. Willingham says pt has been admitted to the hospital and will be evaluated by ortho trauma team there.

## 2024-10-15 NOTE — ASSESSMENT & PLAN NOTE
Patient is not chronically on statin.will not continue for now. Last Lipid Panel:   Lab Results   Component Value Date    CHOL 214 (H) 08/13/2024    HDL 71 08/13/2024    LDLCALC 127.8 08/13/2024    TRIG 76 08/13/2024    CHOLHDL 33.2 08/13/2024     Plan:  -low fat/low calorie diet

## 2024-10-15 NOTE — PLAN OF CARE
OT evaluation completed.   Bed mobility mod A  Rolling to L mod A  Seated EOB patient with severe L lateral lean requiring CGA-min A for balance  Sit to stand with RW min/mod A  Gait trained with RW x20 feet with RW min A  Transfer to bedside chair with RW mod A    Recommend low intensity therapy with 24/7 spv at [place]

## 2024-10-15 NOTE — PLAN OF CARE
Pt progressing to goals  Awake alert x 2   IV Hydralazine fof HBP  Pain controlled  Base fluids infusing  Hourly rounding continues

## 2024-10-15 NOTE — SUBJECTIVE & OBJECTIVE
Past Medical History:   Diagnosis Date    Alzheimer's dementia     Dyslipidemia     Hypertension     Multiple allergies        Past Surgical History:   Procedure Laterality Date    COLONOSCOPY N/A 6/29/2018    Procedure: COLONOSCOPY;  Surgeon: Kermit Leone MD;  Location: [facility] ENDO;  Service: Endoscopy;  Laterality: N/A;    COLONOSCOPY N/A 5/9/2023    Procedure: COLONOSCOPY;  Surgeon: Jazmin Kraus MD;  Location: [facility] ENDO;  Service: Endoscopy;  Laterality: N/A;       Review of patient's allergies indicates:  No Known Allergies    No current facility-administered medications on file prior to encounter.     Current Outpatient Medications on File Prior to Encounter   Medication Sig    acetaminophen (TYLENOL ARTHRITIS PAIN ORAL) Take by mouth daily as needed.    amlodipine-benazepril 10-20mg (LOTREL) 10-20 mg per capsule Take 1 capsule by mouth once daily.    carbidopa-levodopa  mg (SINEMET)  mg per tablet Take 1 tablet by mouth 3 (three) times daily.    cetirizine (ZYRTEC) 10 MG tablet Take 1 tablet by mouth Daily. 1 Tablet Oral Every day.  To get over-the-counter    memantine (NAMENDA) 10 MG Tab Take 10 mg by mouth 2 (two) times daily.    CONSTULOSE 10 gram/15 mL solution TAKE 15 ML BY MOUTH  THREE TIMES DAILY AS NEEDED (Patient not taking: Reported on 10/14/2024)     Family History       Problem Relation (Age of Onset)    Alzheimer's disease Mother    Cataracts Mother    Colon cancer Father    Diabetes Maternal Aunt, Paternal Grandmother    Hypertension Mother    Macular degeneration Maternal Aunt          Tobacco Use    Smoking status: Former    Smokeless tobacco: Never   Substance and Sexual Activity    Alcohol use: No    Drug use: No    Sexual activity: Not Currently     Review of Systems   Constitutional:  Negative for chills, diaphoresis, fatigue and fever.   Respiratory:  Negative for cough and shortness of breath.    Cardiovascular:  Negative for chest pain, palpitations and leg swelling.    Genitourinary:  Negative for difficulty urinating and dysuria.   Musculoskeletal:  Positive for arthralgias, gait problem and myalgias. Negative for back pain, joint swelling, neck pain and neck stiffness.   Skin:  Negative for color change and wound.   Neurological:  Negative for dizziness, facial asymmetry, light-headedness and headaches.   All other systems reviewed and are negative.    Objective:     Vital Signs (Most Recent):  Temp: 98 °F (36.7 °C) (10/15/24 0415)  Pulse: 64 (10/15/24 0415)  Resp: 16 (10/15/24 0415)  BP: (!) 154/66 (10/15/24 0519)  SpO2: 98 % (10/15/24 0415) Vital Signs (24h Range):  Temp:  [97 °F (36.1 °C)-98.2 °F (36.8 °C)] 98 °F (36.7 °C)  Pulse:  [56-93] 64  Resp:  [13-19] 16  SpO2:  [97 %-100 %] 98 %  BP: (110-203)/(60-89) 154/66     Weight: 59 kg (130 lb 1.1 oz)  Body mass index is 18.66 kg/m².     Physical Exam  Vitals and nursing note reviewed.   Constitutional:       General: He is awake. He is not in acute distress.     Appearance: Normal appearance. He is well-developed and well-groomed. He is not ill-appearing, toxic-appearing or diaphoretic.      Comments: Elderly male resting comfortably in stretcher in no acute distress   HENT:      Head: Normocephalic and atraumatic.   Eyes:      Extraocular Movements: Extraocular movements intact.      Conjunctiva/sclera: Conjunctivae normal.      Pupils: Pupils are equal, round, and reactive to light.   Cardiovascular:      Rate and Rhythm: Normal rate and regular rhythm.      Pulses: Normal pulses.           Dorsalis pedis pulses are 2+ on the right side and 2+ on the left side.      Heart sounds: Normal heart sounds. No murmur heard.  Pulmonary:      Effort: Pulmonary effort is normal.      Breath sounds: Normal breath sounds. No decreased breath sounds, wheezing, rhonchi or rales.   Abdominal:      General: Bowel sounds are normal.      Palpations: Abdomen is soft.      Tenderness: There is no abdominal tenderness.   Musculoskeletal:       Cervical back: Normal range of motion and neck supple.      Right lower leg: No edema.      Left lower leg: No edema.      Comments: Limited range of motion of right lower extremity secondary to acute femur fracture noted on imaging.   Skin:     General: Skin is warm and dry.      Capillary Refill: Capillary refill takes less than 2 seconds.   Neurological:      Mental Status: He is alert. Mental status is at baseline.      GCS: GCS eye subscore is 4. GCS verbal subscore is 5. GCS motor subscore is 6.      Cranial Nerves: Cranial nerves 2-12 are intact.   Psychiatric:         Mood and Affect: Mood normal.         Behavior: Behavior normal. Behavior is cooperative.              CRANIAL NERVES     CN III, IV, VI   Pupils are equal, round, and reactive to light.     LABS:  Recent Results (from the past 24 hours)   CBC Auto Differential    Collection Time: 10/14/24 12:00 PM   Result Value Ref Range    WBC 5.74 3.90 - 12.70 K/uL    RBC 3.61 (L) 4.60 - 6.20 M/uL    Hemoglobin 11.1 (L) 14.0 - 18.0 g/dL    Hematocrit 34.0 (L) 40.0 - 54.0 %    MCV 94 82 - 98 fL    MCH 30.7 27.0 - 31.0 pg    MCHC 32.6 32.0 - 36.0 g/dL    RDW 12.2 11.5 - 14.5 %    Platelets 357 150 - 450 K/uL    MPV 10.3 9.2 - 12.9 fL    Immature Granulocytes 0.3 0.0 - 0.5 %    Gran # (ANC) 4.5 1.8 - 7.7 K/uL    Immature Grans (Abs) 0.02 0.00 - 0.04 K/uL    Lymph # 0.8 (L) 1.0 - 4.8 K/uL    Mono # 0.3 0.3 - 1.0 K/uL    Eos # 0.1 0.0 - 0.5 K/uL    Baso # 0.02 0.00 - 0.20 K/uL    nRBC 0 0 /100 WBC    Gran % 78.5 (H) 38.0 - 73.0 %    Lymph % 13.2 (L) 18.0 - 48.0 %    Mono % 5.6 4.0 - 15.0 %    Eosinophil % 2.1 0.0 - 8.0 %    Basophil % 0.3 0.0 - 1.9 %    Differential Method Automated    Comprehensive Metabolic Panel    Collection Time: 10/14/24 12:00 PM   Result Value Ref Range    Sodium 144 136 - 145 mmol/L    Potassium 3.6 3.5 - 5.1 mmol/L    Chloride 103 95 - 110 mmol/L    CO2 29 23 - 29 mmol/L    Glucose 126 (H) 70 - 110 mg/dL    BUN 21 8 - 23 mg/dL     Creatinine 1.4 0.5 - 1.4 mg/dL    Calcium 9.0 8.7 - 10.5 mg/dL    Total Protein 6.8 6.0 - 8.4 g/dL    Albumin 3.4 (L) 3.5 - 5.2 g/dL    Total Bilirubin 0.5 0.1 - 1.0 mg/dL    Alkaline Phosphatase 169 (H) 55 - 135 U/L    AST 23 10 - 40 U/L    ALT 5 (L) 10 - 44 U/L    eGFR 54 (A) >60 mL/min/1.73 m^2    Anion Gap 12 8 - 16 mmol/L   HIV 1/2 Ag/Ab (4th Gen)    Collection Time: 10/14/24  8:01 PM   Result Value Ref Range    HIV 1/2 Ag/Ab Negative Negative   Hepatitis C Antibody    Collection Time: 10/14/24  8:01 PM   Result Value Ref Range    Hepatitis C Ab Negative Negative   HCV Virus Hold Specimen    Collection Time: 10/14/24  8:01 PM   Result Value Ref Range    HEP C Virus Hold Specimen Hold for HCV sendout    CBC Auto Differential    Collection Time: 10/14/24  8:01 PM   Result Value Ref Range    WBC 7.79 3.90 - 12.70 K/uL    RBC 3.94 (L) 4.60 - 6.20 M/uL    Hemoglobin 12.2 (L) 14.0 - 18.0 g/dL    Hematocrit 35.4 (L) 40.0 - 54.0 %    MCV 90 82 - 98 fL    MCH 31.0 27.0 - 31.0 pg    MCHC 34.5 32.0 - 36.0 g/dL    RDW 12.1 11.5 - 14.5 %    Platelets 367 150 - 450 K/uL    MPV 9.9 9.2 - 12.9 fL    Immature Granulocytes 0.4 0.0 - 0.5 %    Gran # (ANC) 5.7 1.8 - 7.7 K/uL    Immature Grans (Abs) 0.03 0.00 - 0.04 K/uL    Lymph # 1.4 1.0 - 4.8 K/uL    Mono # 0.6 0.3 - 1.0 K/uL    Eos # 0.1 0.0 - 0.5 K/uL    Baso # 0.03 0.00 - 0.20 K/uL    nRBC 0 0 /100 WBC    Gran % 72.7 38.0 - 73.0 %    Lymph % 17.7 (L) 18.0 - 48.0 %    Mono % 7.1 4.0 - 15.0 %    Eosinophil % 1.7 0.0 - 8.0 %    Basophil % 0.4 0.0 - 1.9 %    Differential Method Automated    Comprehensive metabolic panel    Collection Time: 10/14/24  9:10 PM   Result Value Ref Range    Sodium 144 136 - 145 mmol/L    Potassium 3.2 (L) 3.5 - 5.1 mmol/L    Chloride 102 95 - 110 mmol/L    CO2 31 (H) 23 - 29 mmol/L    Glucose 90 70 - 110 mg/dL    BUN 23 8 - 23 mg/dL    Creatinine 1.4 0.5 - 1.4 mg/dL    Calcium 8.8 8.7 - 10.5 mg/dL    Total Protein 6.4 6.0 - 8.4 g/dL    Albumin 3.3 (L)  3.5 - 5.2 g/dL    Total Bilirubin 0.4 0.1 - 1.0 mg/dL    Alkaline Phosphatase 165 (H) 55 - 135 U/L    AST 19 10 - 40 U/L    ALT 10 10 - 44 U/L    eGFR 54 (A) >60 mL/min/1.73 m^2    Anion Gap 11 8 - 16 mmol/L   Urinalysis, Reflex to Urine Culture Urine, Clean Catch    Collection Time: 10/14/24 10:52 PM    Specimen: Urine   Result Value Ref Range    Specimen UA Urine, Clean Catch     Color, UA Yellow Yellow, Straw, Oralia    Appearance, UA Clear Clear    pH, UA 7.0 5.0 - 8.0    Specific Gravity, UA 1.020 1.005 - 1.030    Protein, UA 1+ (A) Negative    Glucose, UA Negative Negative    Ketones, UA Negative Negative    Bilirubin (UA) Negative Negative    Occult Blood UA Negative Negative    Nitrite, UA Negative Negative    Urobilinogen, UA Negative <2.0 EU/dL    Leukocytes, UA Negative Negative   Urinalysis Microscopic    Collection Time: 10/14/24 10:52 PM   Result Value Ref Range    RBC, UA 0 0 - 4 /hpf    WBC, UA 0 0 - 5 /hpf    WBC Clumps, UA None None-Rare    Bacteria None None-Occ /hpf    Hyaline Casts, UA 0 0-1/lpf /lpf    Microscopic Comment SEE COMMENT    Protime-INR    Collection Time: 10/14/24 11:24 PM   Result Value Ref Range    Prothrombin Time 11.0 9.0 - 12.5 sec    INR 1.0 0.8 - 1.2   APTT    Collection Time: 10/14/24 11:24 PM   Result Value Ref Range    aPTT 26.7 21.0 - 32.0 sec   CBC auto differential    Collection Time: 10/15/24  4:29 AM   Result Value Ref Range    WBC 7.67 3.90 - 12.70 K/uL    RBC 3.56 (L) 4.60 - 6.20 M/uL    Hemoglobin 11.0 (L) 14.0 - 18.0 g/dL    Hematocrit 31.7 (L) 40.0 - 54.0 %    MCV 89 82 - 98 fL    MCH 30.9 27.0 - 31.0 pg    MCHC 34.7 32.0 - 36.0 g/dL    RDW 11.9 11.5 - 14.5 %    Platelets 312 150 - 450 K/uL    MPV 9.7 9.2 - 12.9 fL    Immature Granulocytes 0.3 0.0 - 0.5 %    Gran # (ANC) 4.6 1.8 - 7.7 K/uL    Immature Grans (Abs) 0.02 0.00 - 0.04 K/uL    Lymph # 2.2 1.0 - 4.8 K/uL    Mono # 0.6 0.3 - 1.0 K/uL    Eos # 0.3 0.0 - 0.5 K/uL    Baso # 0.03 0.00 - 0.20 K/uL    nRBC 0 0  /100 WBC    Gran % 59.9 38.0 - 73.0 %    Lymph % 28.4 18.0 - 48.0 %    Mono % 7.2 4.0 - 15.0 %    Eosinophil % 3.8 0.0 - 8.0 %    Basophil % 0.4 0.0 - 1.9 %    Differential Method Automated        RADIOLOGY  X-Ray Chest 1 View    Result Date: 10/15/2024  EXAMINATION: XR CHEST 1 VIEW CLINICAL HISTORY: preop; TECHNIQUE: Single frontal view of the chest was performed. COMPARISON: None FINDINGS: Lungs are clear. No focal consolidation. No pleural effusion. No pneumothorax. Normal heart size.     No acute findings. Electronically signed by: Lolly De Leon Date:    10/15/2024 Time:    03:51    X-Ray Lumbar Spine AP And Lateral    Result Date: 10/14/2024  EXAM:  XR LUMBAR SPINE AP AND LATERAL CLINICAL HISTORY: Back pain. FINDINGS: Lumbar vertebral body height and alignment are normal.  No evidence of acute fracture.  Multilevel disc space narrowing and spondylosis.  Moderate facet arthropathy lower lumbar spine.      No acute findings. Finalized on: 10/14/2024 10:45 PM By:  Haroldo Bhagat MD [facility]# [id number]      2024-10-14 22:47:51.139    [facility]    CT Hip Without Contrast Right    Result Date: 10/14/2024  EXAMINATION: CT HIP WITHOUT CONTRAST RIGHT CLINICAL HISTORY: Fracture, hip; TECHNIQUE: 1.25 mm axial noncontrast CT images were obtained through the cervical spine using soft tissue and bony algorithm.  Sagittal coronal reformats were also submitted for interpretation. COMPARISON: Radiograph comparison     Positive acute fracture right femur greater trochanter comminuted minimally displaced.  No joint malalignment. All CT scans at this facility use dose modulation, iterative reconstruction, and/or weight base dosing when appropriate to reduce radiation dose to as low as reasonably achievable. Electronically signed by: Estefania Cardenas Date:    10/14/2024 Time:    22:45    CT Hip Without Contrast Left    Result Date: 10/14/2024  EXAMINATION: CT HIP WITHOUT CONTRAST LEFT CLINICAL HISTORY: Fracture, hip; TECHNIQUE: 1.25  mm axial noncontrast CT images were obtained through the cervical spine using soft tissue and bony algorithm.  Sagittal coronal reformats were also submitted for interpretation. COMPARISON: Radiographic comparison     Bones are demineralized.  No acute fracture or dislocation identified left hip. Right hip radiographic comparison with nondisplaced inter trochanteric fracture which is not imaged on CT left hip All CT scans at this facility use dose modulation, iterative reconstruction, and/or weight base dosing when appropriate to reduce radiation dose to as low as reasonably achievable. Electronically signed by: Estefania Cardenas Date:    10/14/2024 Time:    21:18    X-Ray Abdomen Flat And Erect    Result Date: 10/14/2024  EXAM: XR ABDOMEN FLAT AND ERECT CLINICAL HISTORY: Abdominal pain. FINDINGS: Mild constipation.  There is no radiographic evidence of small bowel obstruction, ileus, or gross free air.  No radiopaque urinary tract calculi are identified.      No radiographic evidence of acute abdominal disease. Finalized on: 10/14/2024 8:35 PM By:  Haroldo Bhagat MD [facility]# [id number]      2024-10-14 20:38:04.062    [facility]    X-Ray Femur 2 View Right    Result Date: 10/14/2024  EXAM: XR FEMUR 2 VIEW RIGHT CLINICAL HISTORY: Right leg pain COMPARISON: None TECHNIQUE:  AP and lateral views of the right femur were performed including visualization of the hip and knee joints. FINDINGS: There is a linear lucency raising concern for a nondisplaced intratrochanteric proximal right femoral fracture visible on the AP view only.  No other deformities are appreciated.      Question nondisplaced intertrochanteric proximal femoral fracture.  Correlation with clinical findings would be helpful and more sensitive imaging evaluation by CT is recommended as clinically warranted. Finalized on: 10/14/2024 4:31 PM By:  Tam Ferrera [facility]# [id number]      2024-10-14 16:34:00.196    [facility]    X-Ray Sacrum And Coccyx    Result Date:  10/14/2024  EXAM: XR SACRUM AND COCCYX CLINICAL HISTORY: Pain FINDINGS:  Standard frontal and lateral views of the sacrum and coccyx were performed.  No deformity or abnormal lucency suggesting fracture is identified.      No evidence of acute or recent injury. Finalized on: 10/14/2024 4:29 PM By:  Tam Ferrera [facility]# [id number]      2024-10-14 16:31:40.009    [facility]      EKG    MICROBIOLOGY    MDM     Amount and/or Complexity of Data Reviewed  Clinical lab tests: reviewed  Tests in the radiology section of CPT®: reviewed  Tests in the medicine section of CPT®: reviewed  Discussion of test results with the performing providers: yes  Decide to obtain previous medical records or to obtain history from someone other than the patient: yes  Obtain history from someone other than the patient: yes  Review and summarize past medical records: yes  Discuss the patient with other providers: yes  Independent visualization of images, tracings, or specimens: yes

## 2024-10-15 NOTE — PT/OT/SLP EVAL
Occupational Therapy   Evaluation & Treatment    Name: Jose Long  MRN: [id number]  Admitting Diagnosis: Closed displaced intertrochanteric fracture of right femur  Recent Surgery: * No surgery found *      Recommendations:     Discharge Recommendations: Low Intensity Therapy (24/7 spv)  Discharge Equipment Recommendations:  wheelchair, hospital bed  Barriers to discharge:  None    Assessment:     Jose Long is a 68 y.o. male with a medical diagnosis of Closed displaced intertrochanteric fracture of right femur. Performance deficits affecting function: weakness, gait instability, decreased upper extremity function, decreased ROM, decreased lower extremity function, impaired balance, impaired endurance, pain, impaired cognition, impaired self care skills, decreased safety awareness, impaired functional mobility, orthopedic precautions.      Rehab Prognosis: Good; patient would benefit from acute skilled OT services to address these deficits and reach maximum level of function.       Plan:     Patient to be seen 2 x/week to address the above listed problems via self-care/home management, therapeutic activities, therapeutic exercises  Plan of Care Expires: 10/29/24  Plan of Care Reviewed with: patient, friend    Subjective     Chief Complaint: Pain with bed mobility  Patient/Family Comments/goals: Increase independence    Occupational Profile:  Living Environment: Lives alone in [facility], however neighbor reports he has been staying with patient every night and about 1/2 of day time.  Previous level of function: Pt has been requiring assistance for dressing, bathing, and ambulation to/from bathroom  Roles and Routines: Sleeps in lift chair, doesn't drive  Equipment Used at Home: walker, rolling, shower chair  Assistance upon Discharge: Neighbor reports he will have support at home    Pain/Comfort:  Pain Rating 1: 5/10  Location - Side 1: Right  Location - Orientation 1: generalized  Location 1: hip  Pain Addressed 1:  Reposition  Pain Rating Post-Intervention 1: 5/10    Patients cultural, spiritual, [employer] conflicts given the current situation:      Objective:     Communicated with: Nurse prior to session.  Patient found supine with telemetry upon OT entry to room.    General Precautions: Standard, fall  Orthopedic Precautions: RLE weight bearing as tolerated  Braces: N/A  Respiratory Status: Room air    Occupational Performance:    Bed Mobility:    Patient completed Rolling/Turning to Left with  moderate assistance  Patient completed Scooting/Bridging with moderate assistance  Patient completed Supine to Sit with moderate assistance    Functional Mobility/Transfers:  Patient completed Sit <> Stand Transfer with moderate assistance  with  rolling walker   Patient completed Bed <> Chair Transfer using Stand Pivot technique with moderate assistance with rolling walker  Functional Mobility: Ambulated with RW x20 feet min A    Activities of Daily Living:  Lower Body Dressing: dependence donning hospital socks    Cognitive/Visual Perceptual:  Cognitive/Psychosocial Skills:     -       Oriented to: Person   -       Follows Commands/attention:Follows one-step commands  -       Memory: Impaired STM and Impaired LTM  -       Safety awareness/insight to disability: impaired     Physical Exam:  Balance:    -       fair  Upper Extremity Range of Motion:     -       Right Upper Extremity: WFL  -       Left Upper Extremity: WFL  Upper Extremity Strength:    -       Right Upper Extremity: grossly 3+.5  -       Left Upper Extremity: grossly 3+/5    AMPAC 6 Click ADL:  AMPAC Total Score: 13    Treatment & Education:  OT evaluation completed to assess patient's current level of function. Pt presents with the above stated deficits, limiting his ability to perform safe and independent ADLs and functional mobility. Pt will benefit from skilled OT services to increase functional independence. Pt left seated up in bedside chair and encouraged to  increase time spent in upright position to prevent pneumonia and increase CV endurance. Pt also encouraged to utilize call button for nursing assistance when ready to return to bed. Pt's neighbor verbalized understanding.  Pt's neighbor educated on benefits of utilizing wheelchair temporarily around home until patient is safe to ambulate with RW.     Patient left up in chair with all lines intact, call button in reach, chair alarm on, nurse notified, and neighbor present    GOALS:   Multidisciplinary Problems       Occupational Therapy Goals          Problem: Occupational Therapy    Goal Priority Disciplines Outcome Interventions   Occupational Therapy Goal     OT, PT/OT     Description: Goals to be met by: 10.29.24     Patient will increase functional independence with ADLs by performing:    Toileting from toilet with Moderate Assistance for hygiene and clothing management.   Toilet transfer to toilet with Minimal Assistance.  Upper extremity exercise program x10 reps per handout, with supervision.                         History:     Past Medical History:   Diagnosis Date    Alzheimer's dementia     Dyslipidemia     Hypertension     Multiple allergies          Past Surgical History:   Procedure Laterality Date    COLONOSCOPY N/A 6/29/2018    Procedure: COLONOSCOPY;  Surgeon: Kermit Leone MD;  Location: [facility];  Service: Endoscopy;  Laterality: N/A;    COLONOSCOPY N/A 5/9/2023    Procedure: COLONOSCOPY;  Surgeon: Jazmin Kraus MD;  Location: [facility] ENDO;  Service: Endoscopy;  Laterality: N/A;       Time Tracking:     OT Date of Treatment: 10/15/24  OT Start Time: 1110  OT Stop Time: 1135  OT Total Time (min): 25 min    Billable Minutes:Evaluation 15  Therapeutic Activity 10    JENNIFER Estrada  10/15/2024

## 2024-10-15 NOTE — PLAN OF CARE
10/15/24 1630   Post-Acute Status   Post-Acute Authorization Placement  (Rehab Placement)   Post-Acute Placement Status Referrals Sent   Coverage [employer] - Commercial   Discharge Delays None known at this time   Discharge Plan   Discharge Plan A Rehab       Patient and neighbor at bedside discussed Rehab placement at [facility].  Nanci, rehab liaison meet with Patient and friend/ neighbor to discuss placement. Patient and friend/ neighbor were agreeable to placement and discuss plans for home sitters/ home care upon DC from Rehab.   Per Nanci, she will reach out to therapy in the morning for additional therapy recommendations and look to submit for authorization tomorrow. Per Nanci, they could submit for auth tomorrow and plan to admit Thursday.     SW will continue to follow and assist as needed.

## 2024-10-15 NOTE — ED PROVIDER NOTES
ED Provider Note - 10/14/2024    SCRIBE #1 NOTE: I, Fabiola Begum, am scribing for, and in the presence of,  Ricardo Ybarra MD. I have scribed the entire note.       History     Chief Complaint   Patient presents with    Fall     Hx of parkinsons and fell recently. Went to PCP this AM and had x-rays. MD called and advised to go to ED for poss femur fx.      Pt presents to the ED due to right hip pain following a fall about a week ago. Pt's son reports that the pt has fallen about once a week for the past month. Pt's sxs are constant and moderate in severity. Per the son, the pt was able to get up from the fall, but now needs assistance to get around and walk unlike before. Pt has parkinson's, dementia, and alzheimer's which effect his mental status and fatigue regularly with exertion.    The history is provided by a relative. No  was used.     Review of patient's allergies indicates:  No Known Allergies  Past Medical History:   Diagnosis Date    Dyslipidemia     Hypertension     Multiple allergies      Past Surgical History:   Procedure Laterality Date    COLONOSCOPY N/A 6/29/2018    Procedure: COLONOSCOPY;  Surgeon: Kermit Leone MD;  Location: [facility];  Service: Endoscopy;  Laterality: N/A;    COLONOSCOPY N/A 5/9/2023    Procedure: COLONOSCOPY;  Surgeon: Jazmin Kraus MD;  Location: [facility];  Service: Endoscopy;  Laterality: N/A;     Family History   Problem Relation Name Age of Onset    Cataracts Mother      Hypertension Mother      Alzheimer's disease Mother      Diabetes Maternal Aunt      Macular degeneration Maternal Aunt      Diabetes Paternal Grandmother      Colon cancer Father      Melanoma Neg Hx       Social History     Tobacco Use    Smoking status: Former    Smokeless tobacco: Never   Substance Use Topics    Alcohol use: No    Drug use: No     Review of Systems   Constitutional:  Positive for activity change and fatigue.   Musculoskeletal:  Positive for arthralgias.        Physical Exam     Initial Vitals [10/14/24 1849]   BP Pulse Resp Temp SpO2   135/75 76 14 97.7 °F (36.5 °C) 100 %      MAP       --         Vitals:    10/14/24 1849 10/14/24 1924 10/14/24 1933 10/14/24 1948   BP: 135/75  (!) 174/75    Pulse: 76 78 69    Resp: 14  13    Temp: 97.7 °F (36.5 °C)      TempSrc: Oral      SpO2: 100%  97%    Weight:    58.2 kg (128 lb 4.9 oz)    10/14/24 2111 10/14/24 2203 10/14/24 2318 10/14/24 2332   BP: (!) 193/86 (!) 179/83 (!) 203/88 (!) 173/88   Pulse: 66 65 65 60   Resp: 18 19 18    Temp: 98.2 °F (36.8 °C)      TempSrc: Oral      SpO2: 99% 98% 98% 98%   Weight:         Physical Exam    Constitutional: He appears well-developed and well-nourished. No distress.   HENT:   Head: Atraumatic.   Nose: Nose normal. Mouth/Throat: Oropharynx is clear and moist.   Eyes: Conjunctivae and EOM are normal. Pupils are equal, round, and reactive to light. No scleral icterus.   Neck:   Normal range of motion.  Cardiovascular:  Normal rate, regular rhythm, normal heart sounds and intact distal pulses.           Pulmonary/Chest: Breath sounds normal.   Musculoskeletal:      Cervical back: Normal range of motion.     Neurological: He is alert.   Skin: Skin is warm and dry.   Psychiatric: He has a normal mood and affect. His behavior is normal. Judgment and thought content normal.       ED Course   Procedures                   MDM  Differential Diagnoses   Based on available history, the working differential diagnoses considered during this evaluation include but are not limited to sprain/strain, contusion, fracture, dislocation.      LABS     Labs Reviewed   CBC W/ AUTO DIFFERENTIAL - Abnormal       Result Value    WBC 7.79      RBC 3.94 (*)     Hemoglobin 12.2 (*)     Hematocrit 35.4 (*)     MCV 90      MCH 31.0      MCHC 34.5      RDW 12.1      Platelets 367      MPV 9.9      Immature Granulocytes 0.4      Gran # (ANC) 5.7      Immature Grans (Abs) 0.03      Lymph # 1.4      Mono # 0.6      Eos  # 0.1      Baso # 0.03      nRBC 0      Gran % 72.7      Lymph % 17.7 (*)     Mono % 7.1      Eosinophil % 1.7      Basophil % 0.4      Differential Method Automated     URINALYSIS, REFLEX TO URINE CULTURE - Abnormal    Specimen UA Urine, Clean Catch      Color, UA Yellow      Appearance, UA Clear      pH, UA 7.0      Specific Gravity, UA 1.020      Protein, UA 1+ (*)     Glucose, UA Negative      Ketones, UA Negative      Bilirubin (UA) Negative      Occult Blood UA Negative      Nitrite, UA Negative      Urobilinogen, UA Negative      Leukocytes, UA Negative      Narrative:     Specimen Source->Urine   COMPREHENSIVE METABOLIC PANEL - Abnormal    Sodium 144      Potassium 3.2 (*)     Chloride 102      CO2 31 (*)     Glucose 90      BUN 23      Creatinine 1.4      Calcium 8.8      Total Protein 6.4      Albumin 3.3 (*)     Total Bilirubin 0.4      Alkaline Phosphatase 165 (*)     AST 19      ALT 10      eGFR 54 (*)     Anion Gap 11     HIV 1 / 2 ANTIBODY    HIV 1/2 Ag/Ab Negative      Narrative:     Release to patient->Immediate   HEPATITIS C ANTIBODY    Hepatitis C Ab Negative      Narrative:     Release to patient->Immediate   HEP C VIRUS HOLD SPECIMEN    HEP C Virus Hold Specimen Hold for HCV sendout      Narrative:     Release to patient->Immediate   PROTIME-INR    Prothrombin Time 11.0      INR 1.0     APTT    aPTT 26.7     URINALYSIS MICROSCOPIC    RBC, UA 0      WBC, UA 0      WBC Clumps, UA None      Bacteria None      Hyaline Casts, UA 0      Microscopic Comment SEE COMMENT      Narrative:     Specimen Source->Urine           All available results from the labs ordered were independently reviewed. with findings as follows:  CBC notable for mild anemia with a hematocrit of 35.  Chemistry notable for mild hypokalemia.  Urinalysis unremarkable.  Coagulation studies unremarkable.      Imaging     Imaging Results              X-Ray Chest 1 View (Final result)  Result time 10/15/24 03:51:28      Final result by  Lolly De Leon MD (10/15/24 03:51:28)                   Impression:      No acute findings.      Electronically signed by: Lolly De Leon  Date:    10/15/2024  Time:    03:51               Narrative:    EXAMINATION:  XR CHEST 1 VIEW    CLINICAL HISTORY:  preop;    TECHNIQUE:  Single frontal view of the chest was performed.    COMPARISON:  None    FINDINGS:  Lungs are clear. No focal consolidation. No pleural effusion. No pneumothorax. Normal heart size.                                       CT Hip Without Contrast Right (Final result)  Result time 10/14/24 22:45:10      Final result by Estefania Cardenas MD (10/14/24 22:45:10)                   Impression:      Positive acute fracture right femur greater trochanter comminuted minimally displaced.  No joint malalignment.    All CT scans at this facility use dose modulation, iterative reconstruction, and/or weight base dosing when appropriate to reduce radiation dose to as low as reasonably achievable.      Electronically signed by: Estefania Cardenas  Date:    10/14/2024  Time:    22:45               Narrative:    EXAMINATION:  CT HIP WITHOUT CONTRAST RIGHT    CLINICAL HISTORY:  Fracture, hip;    TECHNIQUE:  1.25 mm axial noncontrast CT images were obtained through the cervical spine using soft tissue and bony algorithm.  Sagittal coronal reformats were also submitted for interpretation.    COMPARISON:  Radiograph comparison                                       CT Hip Without Contrast Left (Final result)  Result time 10/14/24 21:18:41      Final result by Estefania Cardenas MD (10/14/24 21:18:41)                   Impression:      Bones are demineralized.  No acute fracture or dislocation identified left hip.    Right hip radiographic comparison with nondisplaced inter trochanteric fracture which is not imaged on CT left hip    All CT scans at this facility use dose modulation, iterative reconstruction, and/or weight base dosing when appropriate to  reduce radiation dose to as low as reasonably achievable.      Electronically signed by: Estefania Cardenas  Date:    10/14/2024  Time:    21:18               Narrative:    EXAMINATION:  CT HIP WITHOUT CONTRAST LEFT    CLINICAL HISTORY:  Fracture, hip;    TECHNIQUE:  1.25 mm axial noncontrast CT images were obtained through the cervical spine using soft tissue and bony algorithm.  Sagittal coronal reformats were also submitted for interpretation.    COMPARISON:  Radiographic comparison                                       X-Rays:   Independently Interpreted Readings:   Chest X-Ray: Normal heart size.  No infiltrates.  No acute abnormalities.   Other Readings:  CT of the right hip is notable for a intertrochanteric fracture abutting the greater tuberosity.       EKG   EKG Readings: (Independently Interpreted)   Initial Reading: No STEMI. Rhythm: Normal Sinus Rhythm. Heart Rate: 61. Ectopy: No Ectopy.       ED Management/Discussion     Medications   sodium chloride 0.9% flush 3 mL (has no administration in time range)   lactated ringers infusion (has no administration in time range)   melatonin tablet 6 mg (has no administration in time range)   ondansetron injection 4 mg (has no administration in time range)   polyethylene glycol packet 17 g (has no administration in time range)   acetaminophen tablet 650 mg (has no administration in time range)   simethicone chewable tablet 80 mg (has no administration in time range)   aluminum-magnesium hydroxide-simethicone 200-200-20 mg/5 mL suspension 30 mL (has no administration in time range)   acetaminophen suppository 650 mg (has no administration in time range)   HYDROcodone-acetaminophen 5-325 mg per tablet 1 tablet (has no administration in time range)   morphine injection 2 mg (has no administration in time range)   naloxone 0.4 mg/mL injection 0.02 mg (has no administration in time range)   glucose chewable tablet 16 g (has no administration in time range)   glucose  chewable tablet 24 g (has no administration in time range)   glucagon (human recombinant) injection 1 mg (has no administration in time range)   dextrose 10% bolus 125 mL 125 mL (has no administration in time range)   dextrose 10% bolus 250 mL 250 mL (has no administration in time range)   morphine injection 2 mg (2 mg Intravenous Given 10/14/24 2319)   amLODIPine tablet 10 mg (10 mg Oral Given 10/14/24 2319)            ED Course as of 10/14/24 2358   Mon Oct 14, 2024   8394 Available findings were discussed with the patient and his accompanying son.  Unfortunately it appears that the left hip was imaged in CT scan and not the intended right hip.  They have been made aware of this as well as the intent to return for a CT of the right hip. [ST]      ED Course User Index  [ST] Ricardo Ybarra MD     11:01 PM: Discussed pt's case along with the findings of the CT on the right hip with Dr. Powers (orthopedics) who requests that the pt be NPO for anticipated surgery due to right-sided intertrochanteric fracture via phone call.    11:06 PM: Discussed case with Dr. Alegria ([facility] Medicine). Dr. Alegria agrees with current care and management of pt and accepts admission.   Admitting Service: [facility] medicine  Admitting Physician: Dr. Alegria  Admit to: inpt med/tele    11:08 PM: Re-evaluated pt. I have discussed test results, shared treatment plan, and the need for admission with patient and family at bedside. Pt and family express understanding at this time and agree with all information. All questions answered. Pt and family have no further questions or concerns at this time. Pt is ready for admit.      The patient's list of active medical problems, social history, medications, and allergies as documented per RN staff has been reviewed.               On final assessment, the patient appears suitable for discharge.  I see no indication of an emergent process beyond that addressed during our encounter but have duly  counseled the patient/family regarding the need for prompt follow-up as well as the indications that should prompt immediate return to the emergency room.  The patient/family has been provided with language -specific verbal and printed direction regarding our final diagnosis(es) as well as instructions regarding use of OTC and/or Rx medications intended to manage the patient's aforementioned conditions including:  ED Prescriptions    None           Patient has been advised of the following recommended follow-up instructions:  Follow-up Information    None       The patient/family communicates understanding of all this information and all remaining questions and concerns were addressed at this time.      CLINICAL IMPRESSION    ICD-10-CM ICD-9-CM   1. Closed comminuted intertrochanteric fracture of right femur, initial encounter  S72.141A 820.21   2. Pre-operative clearance  Z01.818 V72.84          ED Disposition Condition    Admit Stable            Scribe Attestation:   Scribe #1: I performed the above scribed service and the documentation accurately describes the services I performed. I attest to the accuracy of the note.    Attending Attestation:           Physician Attestation for Scribe:  Physician Attestation Statement for Scribe #1: I, Ricardo Ybarra MD, reviewed documentation, as scribed by Fabiola Begum in my presence, and it is both accurate and complete.                  Ricardo Ybarra MD  10/15/24 0710

## 2024-10-15 NOTE — PT/OT/SLP EVAL
Speech Language Pathology Evaluation  Cognitive/Bedside Swallow    Patient Name:  Jose Long   MRN:  [id number]  Admitting Diagnosis: Closed displaced intertrochanteric fracture of right femur    Recommendations:                  General Recommendations:  Diet follow-up, Speech/language therapy and Cognitive-linguistic therapy  Diet recommendations:  Soft & Bite Sized Diet - IDDSI Level 6, Thin liquids - IDDSI Level 0   Aspiration Precautions: Alternating bites/sips, Eliminate distractions, Feed only when awake/alert, Frequent oral care, HOB to 90 degrees, and Standard aspiration precautions   General Precautions: Standard, aspiration  Communication strategies:  provide increased time to answer    Assessment:     Jose Long is a 68 y.o. male with a PMHx significant for Parkinson's disease, dementia, and HTN who presented to the ED s/t r hip pain following a fall this past week. Pt's friend at bedside throughout  eval and reports frequent falls approx 1x a week. Pt lives alone, but his friend at bedside frequently spends the night w him and intermittently assists him throughout the day w ADLs. Pt and friend report initiation of discussion for caregivers during the day but had not made any decisions on this prior to admission. ST discussed recommendation of 24 hour care s/t not only functional decline but safety concern s/t cognitive deficits. He denies any prior ST.  Pt presents w decreased vocal intensity, masked expression, and slowed rate of speech throughout that required careful listening and occasional request for repetition. Pt w baseline of dementia dx and appreciated w deficits in short term memory, thought processing, and attention that may impact safety awareness at home. Pt w generalized weakness of OM w no overt s/s of aspiration appreciated throughout clinical swallow evaluation. He reports instances of globus sensation at home and may benefit from OP MBSS for further assessment. He is recommended  for a diet of soft and bite sized solids and thin liquids (IDDSI 6/0) following standard aspiration precautions. Pt will benefit from post-acute ST, likely in OP setting, for appreciated deficits related to PD/dementia. ST will follow-up.     History:     Past Medical History:   Diagnosis Date    Alzheimer's dementia     Dyslipidemia     Hypertension     Multiple allergies        Past Surgical History:   Procedure Laterality Date    COLONOSCOPY N/A 6/29/2018    Procedure: COLONOSCOPY;  Surgeon: Kermit Leone MD;  [facility]: [facility] ENDO;  Service: Endoscopy;  Laterality: N/A;    COLONOSCOPY N/A 5/9/2023    Procedure: COLONOSCOPY;  Surgeon: Jazmin Kraus MD;  Location: [facility] ENDO;  Service: Endoscopy;  Laterality: N/A;       Social History: Patient lives by himself in [place], [place].    Prior Intubation HX:  NA    Modified Barium Swallow: NA    XR CHEST 1 VIEW on 10/14/2024:     FINDINGS:  Lungs are clear. No focal consolidation. No pleural effusion. No pneumothorax. Normal heart size.     Impression:  No acute findings.     Electronically signed by:Lolly De Leon  Date:                                            10/15/2024  Time:                                           03:51    Prior diet: Pt reports consuming a regular diet, but friend who lives nearby cuts his food into bite size pieces for him.    Subjective     Pt seen resting in bed for ST eval w friend who assists him w care at bedside  Patient goals: to go home     Pain/Comfort:  Pain Rating 1: 0/10  Pain Rating Post-Intervention 1: 0/10  Pain Rating 2: 0/10  Pain Rating Post-Intervention 2: 0/10    Objective:     Cognitive Status:    Pt oriented to self and situation . Appreciated w deficits in short term memory, thought processing, and attention that may impact safety awareness at home. Pt's friend reports when pt does not have a caregiver present is when these frequent falls have been taking place.      Receptive Language:   Comprehension:    ntermittently decreased topic maintenance w pt requesting repetition of question    Pragmatics:    Flat affect; masked expression    Expressive Language:  Verbal:    WFL in conversation able to communicate acute wants/needs, intermittently decreased topic maintenance w pt requesting repetition of question      Motor Speech:  Pt w decreased vocal intensity, flat affect, generalized OM weakness, and slowed rate of speech     Voice:   Decreased vocal intensity     Oral Musculature Evaluation  Oral Musculature: general weakness  Dentition: present and adequate  Secretion Management: adequate  Mucosal Quality: good  Mandibular Strength and Mobility: impaired  Oral Labial Strength and Mobility:  (impaired strength)  Lingual Strength and Mobility: impaired strength (lingual tremor)  Velar Elevation: WFL  Volitional Cough: apprecaited  Volitional Swallow: present  Voice Prior to PO Intake: low volume; clear    Bedside Swallow Eval:     [place] Swallow Protocol:  [place] Swallow Protocol dictates patient remain NPO if fail screener (Yajairar et al. 2014).  The [place] Swallow Protocol was administered. The patient was alert and provided the instructions prior to the beginning of the protocol. The patient consumed 3 oz before putting the cup down. Patient drank with consecutive swallows. No overt s/s of pharyngeal dysphagia or aspiration were observed during today's assessment.     Clinical Swallow Examination:   Of note, patient self-fed throughout evaluation. Patient presented with:     CONSISTENCY  NOTES   THIN (IDDSI 0) 3 oz water challenge    No overt s/s of aspiration appreciated    PUREE (IDDSI 4/Extremely Thick)   TSP/TBSP bites of applesauce No overt s/s of aspiration appreciated    SOLID (IDDSI 7/Regular) Bite of aries cracker cookie   No overt s/s of aspiration appreciated. Pt w mild oral residue cleared w an independent request for LW.        Thickened liquids were not used in this assessment. Nina (2018) reported that  thickened liquids have no sound evidence at reducing the risk of pneumonia in patients with dysphagia and can cause harm by increasing their risk of dehydration. It also presents an increased risk of UTI, electrolyte imbalance, constipation, fecal impaction, cognitive impairment, functional decline and even death (Langmore, 2002; Cely, 2016).  Thickened liquids are associated with risks including dehydration, increased pharyngeal residue, potential interference with medication absorption, and decreased quality of life (Gilbert, 2013). Thickened liquids are also more likely to be silently aspirated than thin liquids (Eloy et al., 2018). This supports the assertion that we should confirm a patient requires thickened liquids with an instrumental swallow study prior to recommending them.    References:   Gilbert SANCHEZ (2013). Thickening agents used for dysphagia management: Effect on bioavailability of water, medication and feelings of satiety. Nutrition Journal, 12, 54. https://doi.org/10.1186/8696-4402-50-54    ELDER Lyons., TADEO Cruz, JOSE Tidwell, & DANIEL Roque (2018). Cough response to aspiration in thin and thick fluids during FEES in hospitalized inpatients. International journal of language & communication disorders, 53(5), 909-918. https://doi.org/10.1111/7091-1929.75735    INTERPRETATION AND RISK ASSESSMENT:  Clinical swallow evaluation (CSE) revealed oral phase characterized by lingual, labial, and buccal strength and range of motion adequate for lip closure, bolus preparation and propulsion. The patient had no anterior loss of the bolus with complete closure of the lips around the utensils. Mild residue remained in the oral cavity following the swallow for solid (IDDSI 7) consistency. Patient without overt clinical signs/symptoms of aspiration on any PO trials given. Patient presents with a possibly inefficient swallow as indicated by OM weakness. Contributing risk factors for dysphagia include cognitive  deficits. Patient with increased risk for silent aspiration given potential sensory deficits related to Parkinson's Disease . Instrumental swallow study is not indicated to assess the swallowing physiology and rule out aspiration of various consistencies at this time. Should pt's reports of intermittent globus sensation at home increase, pt may benefit from OP MBSS.     Goals:   Multidisciplinary Problems       SLP Goals          Problem: SLP    Goal Priority Disciplines Outcome   SLP Goal     SLP    Description: TPW tolerate least restrictive PO diet   TPW engage in ongoing motor speech and cognitive-linguistic assessment                        Plan:     Patient to be seen:  2 x/week, 3 x/week   Plan of Care expires:  10/22/24  Plan of Care reviewed with:  patient, friend   SLP Follow-Up:  Yes       Discharge recommendations:  Therapy Intensity Recommendations at Discharge: Low Intensity Therapy   Barriers to Discharge:  Decreased Care Giver Support    Time Tracking:     SLP Treatment Date:   10/15/24  Speech Start Time:  1039  Speech Stop Time:  1100     Speech Total Time (min):  21 min    Billable Minutes: Eval 11  and Eval Swallow and Oral Function 10    Judith Krystin Vega MA, PL-SLP, CF-SLP  Speech Language Pathologist  10/15/2024

## 2024-10-15 NOTE — ASSESSMENT & PLAN NOTE
Anemia is likely due to  chronic age related changes . Most recent hemoglobin and hematocrit are listed below.  Recent Labs     10/14/24  1200 10/14/24  2001 10/15/24  0429   HGB 11.1* 12.2* 11.0*   HCT 34.0* 35.4* 31.7*     Plan  - Monitor serial CBC: Daily  - Transfuse PRBC if patient becomes hemodynamically unstable, symptomatic or H/H drops below 7/21.  - Patient has not received any PRBC transfusions to date  - Patient's anemia is currently stable

## 2024-10-15 NOTE — CONSULTS
[facility] - Telemetry ([facility])  Wound Care    Patient Name:  Jose Long   MRN:  [id number]  Date: 10/15/2024  Diagnosis: Closed displaced intertrochanteric fracture of right femur    History:     Past Medical History:   Diagnosis Date    Alzheimer's dementia     Dyslipidemia     Hypertension     Multiple allergies        Social History     Socioeconomic History    Marital status: Single   Tobacco Use    Smoking status: Former    Smokeless tobacco: Never   Substance and Sexual Activity    Alcohol use: No    Drug use: No    Sexual activity: Not Currently     Social Drivers of Health     Financial Resource Strain: Low Risk  (6/19/2019)    Overall Financial Resource Strain (CARDIA)     Difficulty of Paying Living Expenses: Not hard at all   Food Insecurity: No Food Insecurity (6/19/2019)    Hunger Vital Sign     Worried About Running Out of Food in the Last Year: Never true     Ran Out of Food in the Last Year: Never true   Transportation Needs: No Transportation Needs (10/15/2024)    TRANSPORTATION NEEDS     Transportation : No   Physical Activity: Sufficiently Active (6/19/2019)    Exercise Vital Sign     Days of Exercise per Week: 2 days     Minutes of Exercise per Session: 90 min   Stress: No Stress Concern Present (6/19/2019)    [employer] - Occupational Stress Questionnaire     Feeling of Stress : Not at all       Precautions:     Allergies as of 10/14/2024    (No Known Allergies)       WO Assessment Details/Treatment     High Risk Wound Care Screen completed due to documented low Gunnar Score (14).   Chart reviewed.   Patient admitted after fall at home with right femur fracture. PMH significant for alzheimers dementia, HTN, Parkinsons. Ortho surgery consult pending.  Patient on IsoTour bed - recommend Apply Low Air Loss pump to hospital bed.  Pressure Injury Prevention Orderset initiated.        10/15/2024

## 2024-10-16 LAB
ALBUMIN SERPL BCP-MCNC: 3 G/DL (ref 3.5–5.2)
ALP SERPL-CCNC: 148 U/L (ref 55–135)
ALT SERPL W/O P-5'-P-CCNC: 10 U/L (ref 10–44)
ANION GAP SERPL CALC-SCNC: 10 MMOL/L (ref 8–16)
AST SERPL-CCNC: 22 U/L (ref 10–40)
BASOPHILS # BLD AUTO: 0.02 K/UL (ref 0–0.2)
BASOPHILS NFR BLD: 0.3 % (ref 0–1.9)
BILIRUB SERPL-MCNC: 0.6 MG/DL (ref 0.1–1)
BUN SERPL-MCNC: 19 MG/DL (ref 8–23)
CALCIUM SERPL-MCNC: 8.2 MG/DL (ref 8.7–10.5)
CHLORIDE SERPL-SCNC: 103 MMOL/L (ref 95–110)
CO2 SERPL-SCNC: 29 MMOL/L (ref 23–29)
CREAT SERPL-MCNC: 1.2 MG/DL (ref 0.5–1.4)
DIFFERENTIAL METHOD BLD: ABNORMAL
EOSINOPHIL # BLD AUTO: 0.2 K/UL (ref 0–0.5)
EOSINOPHIL NFR BLD: 3.2 % (ref 0–8)
ERYTHROCYTE [DISTWIDTH] IN BLOOD BY AUTOMATED COUNT: 12.1 % (ref 11.5–14.5)
EST. GFR  (NO RACE VARIABLE): >60 ML/MIN/1.73 M^2
GLUCOSE SERPL-MCNC: 100 MG/DL (ref 70–110)
HCT VFR BLD AUTO: 31.2 % (ref 40–54)
HGB BLD-MCNC: 10.5 G/DL (ref 14–18)
IMM GRANULOCYTES # BLD AUTO: 0.02 K/UL (ref 0–0.04)
IMM GRANULOCYTES NFR BLD AUTO: 0.3 % (ref 0–0.5)
LYMPHOCYTES # BLD AUTO: 1.2 K/UL (ref 1–4.8)
LYMPHOCYTES NFR BLD: 20.9 % (ref 18–48)
MAGNESIUM SERPL-MCNC: 1.9 MG/DL (ref 1.6–2.6)
MCH RBC QN AUTO: 30.3 PG (ref 27–31)
MCHC RBC AUTO-ENTMCNC: 33.7 G/DL (ref 32–36)
MCV RBC AUTO: 90 FL (ref 82–98)
MONOCYTES # BLD AUTO: 0.5 K/UL (ref 0.3–1)
MONOCYTES NFR BLD: 7.7 % (ref 4–15)
NEUTROPHILS # BLD AUTO: 4 K/UL (ref 1.8–7.7)
NEUTROPHILS NFR BLD: 67.6 % (ref 38–73)
NRBC BLD-RTO: 0 /100 WBC
PHOSPHATE SERPL-MCNC: 2.8 MG/DL (ref 2.7–4.5)
PLATELET # BLD AUTO: 298 K/UL (ref 150–450)
PMV BLD AUTO: 9.8 FL (ref 9.2–12.9)
POTASSIUM SERPL-SCNC: 3.1 MMOL/L (ref 3.5–5.1)
PROT SERPL-MCNC: 5.7 G/DL (ref 6–8.4)
RBC # BLD AUTO: 3.46 M/UL (ref 4.6–6.2)
SODIUM SERPL-SCNC: 142 MMOL/L (ref 136–145)
WBC # BLD AUTO: 5.85 K/UL (ref 3.9–12.7)

## 2024-10-16 PROCEDURE — 63600175 PHARM REV CODE 636 W HCPCS: Performed by: HOSPITALIST

## 2024-10-16 PROCEDURE — 80053 COMPREHEN METABOLIC PANEL: CPT | Performed by: NURSE PRACTITIONER

## 2024-10-16 PROCEDURE — 36415 COLL VENOUS BLD VENIPUNCTURE: CPT | Performed by: NURSE PRACTITIONER

## 2024-10-16 PROCEDURE — 63600175 PHARM REV CODE 636 W HCPCS: Performed by: NURSE PRACTITIONER

## 2024-10-16 PROCEDURE — 85025 COMPLETE CBC W/AUTO DIFF WBC: CPT | Performed by: NURSE PRACTITIONER

## 2024-10-16 PROCEDURE — 92526 ORAL FUNCTION THERAPY: CPT

## 2024-10-16 PROCEDURE — 21400001 HC TELEMETRY ROOM

## 2024-10-16 PROCEDURE — 84100 ASSAY OF PHOSPHORUS: CPT | Performed by: NURSE PRACTITIONER

## 2024-10-16 PROCEDURE — 97110 THERAPEUTIC EXERCISES: CPT

## 2024-10-16 PROCEDURE — 97116 GAIT TRAINING THERAPY: CPT

## 2024-10-16 PROCEDURE — 99233 SBSQ HOSP IP/OBS HIGH 50: CPT | Mod: ,,, | Performed by: ORTHOPAEDIC SURGERY

## 2024-10-16 PROCEDURE — 25000003 PHARM REV CODE 250: Performed by: NURSE PRACTITIONER

## 2024-10-16 PROCEDURE — 83735 ASSAY OF MAGNESIUM: CPT | Performed by: NURSE PRACTITIONER

## 2024-10-16 RX ORDER — POTASSIUM CHLORIDE 20 MEQ/1
40 TABLET, EXTENDED RELEASE ORAL ONCE
Status: COMPLETED | OUTPATIENT
Start: 2024-10-16 | End: 2024-10-16

## 2024-10-16 RX ORDER — DOCUSATE SODIUM 100 MG
200 CAPSULE ORAL
Status: DISCONTINUED | OUTPATIENT
Start: 2024-10-16 | End: 2024-10-17 | Stop reason: HOSPADM

## 2024-10-16 RX ADMIN — HYDROCODONE BITARTRATE AND ACETAMINOPHEN 1 TABLET: 5; 325 TABLET ORAL at 08:10

## 2024-10-16 RX ADMIN — MEMANTINE 10 MG: 10 TABLET ORAL at 08:10

## 2024-10-16 RX ADMIN — Medication 200 ML: at 04:10

## 2024-10-16 RX ADMIN — CARBIDOPA AND LEVODOPA 1 TABLET: 10; 100 TABLET ORAL at 10:10

## 2024-10-16 RX ADMIN — FAMOTIDINE 20 MG: 20 TABLET ORAL at 08:10

## 2024-10-16 RX ADMIN — MELATONIN TAB 3 MG 6 MG: 3 TAB at 08:10

## 2024-10-16 RX ADMIN — HYDRALAZINE HYDROCHLORIDE 10 MG: 20 INJECTION, SOLUTION INTRAMUSCULAR; INTRAVENOUS at 11:10

## 2024-10-16 RX ADMIN — ENOXAPARIN SODIUM 40 MG: 40 INJECTION SUBCUTANEOUS at 04:10

## 2024-10-16 RX ADMIN — SENNOSIDES AND DOCUSATE SODIUM 1 TABLET: 50; 8.6 TABLET ORAL at 08:10

## 2024-10-16 RX ADMIN — CARBIDOPA AND LEVODOPA 1 TABLET: 10; 100 TABLET ORAL at 04:10

## 2024-10-16 RX ADMIN — CARBIDOPA AND LEVODOPA 1 TABLET: 10; 100 TABLET ORAL at 08:10

## 2024-10-16 RX ADMIN — LACTULOSE 10 G: 20 SOLUTION ORAL at 08:10

## 2024-10-16 RX ADMIN — ACETAMINOPHEN 650 MG: 325 TABLET ORAL at 06:10

## 2024-10-16 RX ADMIN — POTASSIUM CHLORIDE 40 MEQ: 1500 TABLET, EXTENDED RELEASE ORAL at 04:10

## 2024-10-16 NOTE — PROGRESS NOTES
[facility] Medicine  Progress Note    Patient Name: Jose Long  MRN: [id number]  Patient Class: IP- Inpatient   Admission Date: 10/14/2024  Length of Stay: 2 days  Attending Physician: Gerard Khanna MD  Primary Care Provider: Harshad Hernandez MD        Subjective:     Principal Problem:Closed displaced intertrochanteric fracture of right femur        HPI:  Jose Long is a 68 y.o. male with a PMH  has a past medical history of Alzheimer's dementia, Dyslipidemia, Hypertension, and Multiple allergies.presented to the ED due to right hip pain following a fall about a week ago. Pt's son reports that the pt has fallen about once a week for the past month. Pt's sxs are constant and moderate in severity. Per the son, the pt was able to get up from the fall, but now needs assistance to get around and walk unlike before.  Patient was evaluated by his PCP earlier yesterday and had plain film imaging performed.  Patient was notified to proceed to the ER for further evaluation for possible right femur fracture.  Reports only mild discomfort in right hip.  Denies any other complaints at this time.  Pt has parkinson's, dementia, and alzheimer's which effect his mental status and fatigue regularly with exertion.     ER workup revealed CBC to be unremarkable.  CMP revealed potassium of 3.2.  UA unremarkable.  Plain film imaging of sacrum and lumbar spine negative for acute findings.  Plain film imaging of right femur revealed questionable nondisplaced intertrochanteric proximal femoral fracture.  CTA head without contrast revealed  Positive acute fracture right femur greater trochanter comminuted minimally displaced.  No joint malalignment.  Orthopedic provider consulted.  Recommend Hospital Medicine to admit for surgical repair in a.m..  Patient and family member in agreement with treatment plan.  Patient will be admitted under inpatient status.      PCP: Harshad Hernandez  P.      Overview/Hospital Course:  The patient is a 67 yo male with Parkinson's disease, HTN, and mild dementia who was admitted with a right hip closed minimally displaced greater trochanteric hip fracture. Orthopedics was consulted and does not plan surgery. She recommended WBAT. OK to discharge from Ortho standpoint.   Pt does have hx of several falls in last few weeks. He has parkinson's disease and would benefit from inpatient rehab. His neighbor only provides intermittent supervision. According to PT/OT notes, pt requires 24 assistance and requires moderate assistance for most activities. Speech therapy evaluated pt and recommends aspiration precautions and continued speech therapy. CM consulted for inpatient rehab placement     Interval History: ortho recommended WBAT. Participating in therapy, walked with walker.      Review of Systems   Constitutional:  Positive for activity change and appetite change. Negative for chills, diaphoresis, fatigue and fever.   HENT:  Negative for congestion, nosebleeds, sore throat and trouble swallowing.    Eyes:  Negative for pain, discharge and visual disturbance.   Respiratory:  Negative for apnea, cough, chest tightness, shortness of breath, wheezing and stridor.    Cardiovascular:  Negative for chest pain, palpitations and leg swelling.   Gastrointestinal:  Negative for abdominal distention, abdominal pain, blood in stool, constipation, diarrhea, nausea and vomiting.   Endocrine: Negative for cold intolerance and heat intolerance.   Genitourinary:  Negative for difficulty urinating, dysuria, flank pain, frequency and urgency.   Musculoskeletal:  Positive for arthralgias (right hip pain) and gait problem. Negative for back pain, joint swelling, myalgias, neck pain and neck stiffness.   Skin:  Negative for rash and wound.   Allergic/Immunologic: Negative for food allergies and immunocompromised state.   Neurological:  Negative for dizziness, seizures, syncope, facial  asymmetry, weakness, light-headedness and headaches.   Hematological:  Negative for adenopathy.   Psychiatric/Behavioral:  Negative for agitation, behavioral problems and confusion. The patient is not nervous/anxious.      Objective:     Vital Signs (Most Recent):  Temp: 98.7 °F (37.1 °C) (10/16/24 1141)  Pulse: 79 (10/16/24 1300)  Resp: 20 (10/16/24 1141)  BP: (!) 181/81 (10/16/24 1300)  SpO2: 97 % (10/16/24 1141) Vital Signs (24h Range):  Temp:  [97.8 °F (36.6 °C)-98.7 °F (37.1 °C)] 98.7 °F (37.1 °C)  Pulse:  [60-88] 79  Resp:  [18-20] 20  SpO2:  [96 %-98 %] 97 %  BP: (155-211)/(74-96) 181/81     Weight: 57.1 kg (125 lb 14.1 oz)  Body mass index is 18.06 kg/m².    Intake/Output Summary (Last 24 hours) at 10/16/2024 1516  Last data filed at 10/16/2024 0749  Gross per 24 hour   Intake 240 ml   Output 550 ml   Net -310 ml         Physical Exam  Vitals and nursing note reviewed.   Constitutional:       General: He is not in acute distress.     Appearance: He is well-developed. He is not diaphoretic.   HENT:      Head: Normocephalic and atraumatic.      Nose: Nose normal.   Eyes:      General: No scleral icterus.     Conjunctiva/sclera: Conjunctivae normal.   Cardiovascular:      Rate and Rhythm: Normal rate and regular rhythm.      Heart sounds: Normal heart sounds. No murmur heard.     No friction rub. No gallop.   Pulmonary:      Effort: Pulmonary effort is normal. No respiratory distress.      Breath sounds: Normal breath sounds. No stridor. No wheezing or rales.   Chest:      Chest wall: No tenderness.   Abdominal:      General: Bowel sounds are normal. There is no distension.      Palpations: Abdomen is soft.      Tenderness: There is no abdominal tenderness. There is no guarding or rebound.   Musculoskeletal:         General: Tenderness (right hip) present. No deformity. Normal range of motion.      Cervical back: Normal range of motion and neck supple.   Skin:     General: Skin is warm and dry.      Coloration:  Skin is not pale.      Findings: No erythema or rash.   Neurological:      Mental Status: He is alert and oriented to person, place, and time.      Cranial Nerves: No cranial nerve deficit.      Motor: No abnormal muscle tone.      Coordination: Coordination normal.      Deep Tendon Reflexes: Reflexes are normal and symmetric.   Psychiatric:         Behavior: Behavior normal.         Thought Content: Thought content normal.         Cognition and Memory: Cognition is impaired.             Significant Labs: All pertinent labs within the past 24 hours have been reviewed.    Significant Imaging: I have reviewed all pertinent imaging results/findings within the past 24 hours.    Assessment/Plan:      * Closed displaced intertrochanteric fracture of right femur  RCRI revealed class 1 risk with 3.9% 30 day risk of death, mi, cardiac arrest.  Ortho consulted for surgical repair in a.m..  Plan:  -NPO  -Continue current pain regimen, titrate as needed  -Bowel regimen  -Bedrest  -None weightbearing RLE  -Continue splint per ortho recs  -IVFs prn  -Antiemetics prn  -Tylenol as needed for fever   -PT/OT   -preop labs and imaging    10/15/24: Orthopedics was consulted and does not plan for surgery today. She will be reviewing films with her colleagues and make determination after. She recommended WBAT. Pt does have hx of several falls in last few weeks. He has parkinson's disease and would benefit from inpatient rehab. His neighbor only provides intermittent supervision. According to PT/OT notes, pt requires 24 assistance and requires moderate assistance for most activities. Speech therapy evaluated pt and recommends aspiration precautions and continued speech therapy.     10/16/24: CM consulted for inpatient rehab. Pt tolerating therapy well     Falls frequently  PT/OT   Rehab consult       Anemia  Anemia is likely due to  chronic age related changes . Most recent hemoglobin and hematocrit are listed below.  Recent Labs      10/14/24  1200 10/14/24  2001 10/15/24  0429   HGB 11.1* 12.2* 11.0*   HCT 34.0* 35.4* 31.7*     Plan  - Monitor serial CBC: Daily  - Transfuse PRBC if patient becomes hemodynamically unstable, symptomatic or H/H drops below 7/21.  - Patient has not received any PRBC transfusions to date  - Patient's anemia is currently stable      Parkinson disease  Cont Sinemet       Alzheimer's dementia  Patient with dementia with likely etiology of alzheimer's dementia. Dementia is mild. The patient does not have signs of behavioral disturbance. Home dementia medications are Held or Continued: continued.. Continue non-pharmacologic interventions to prevent delirium (No VS between 11PM-5AM, activity during day, opening blinds, providing glasses/hearing aids, and up in chair during daytime). Will avoid narcotics and benzos unless absolutely necessary. PRN anti-psychotics are not prescribed to avoid self harm behaviors.    Very mild cognitive impairment     Dyslipidemia  Patient is not chronically on statin.will not continue for now. Last Lipid Panel:   Lab Results   Component Value Date    CHOL 214 (H) 08/13/2024    HDL 71 08/13/2024    LDLCALC 127.8 08/13/2024    TRIG 76 08/13/2024    CHOLHDL 33.2 08/13/2024     Plan:  -low fat/low calorie diet        HTN (hypertension)  Patients blood pressure range in the last 24 hours was: BP  Min: 110/60  Max: 203/88.The patient's inpatient anti-hypertensive regimen is listed below:  Current Antihypertensives  hydrALAZINE injection 10 mg, Every 6 hours PRN, Intravenous    Plan  - BP is controlled, no changes needed to their regimen        VTE Risk Mitigation (From admission, onward)           Ordered     enoxaparin injection 40 mg  Every 24 hours         10/15/24 1245     Reason for No Pharmacological VTE Prophylaxis  Once        Comments: Planned surgical procedure   Question:  Reasons:  Answer:  Physician Provided (leave comment)    10/14/24 2314     IP VTE HIGH RISK PATIENT  Once          10/14/24 2316     Place sequential compression device  Until discontinued         10/14/24 2316                    Discharge Planning   NATHNA:      Code Status: DNR   Is the patient medically ready for discharge?:     Reason for patient still in hospital (select all that apply): Pending disposition  Discharge Plan A: Rehab   Discharge Delays: None known at this time              Narcisa Alba NP  Department of Hospital Medicine   [facility] ([facility])

## 2024-10-16 NOTE — PROGRESS NOTES
* No surgery found *       SUBJECTIVE:   Patient reports:  Doing ok.    Neighbor/friend Ray at bedside.    PT/Nursing notes reviewed.       Physical Exam:   Vital Signs   Wt Readings from Last 1 Encounters:   10/15/24 57.1 kg (125 lb 14.1 oz)     Temp Readings from Last 1 Encounters:   10/16/24 98.4 °F (36.9 °C) (Oral)     BP Readings from Last 1 Encounters:   10/16/24 (!) 175/82     Pulse Readings from Last 1 Encounters:   10/16/24 88       Body mass index is 18.06 kg/m².    General Appearance:   NAD, well appearing, cooperative    Neurologic:  Alert and oriented x3    Pysch:  Age appropriate    STATION:   Seated at edge of bed, needing cues to not lean back.    Musculoskeletal:   Right HIP:    Skin intact.  Negative ecchymoses.  Positive swelling.  Thigh/calf soft.  Positive tenderness over greater trochanter, none over anterior hip. AROM flexes hip to 45 degrees easily while seated.  No pain with log rolling.  Sensation Intact.  EHL strength  5/5.  Ankle plantar/dorsiflexion strength  5/5.  Cap refill <2s.                    LABS:   Hemoglobin   Date/Time Value Ref Range Status   10/16/2024 04:14 AM 10.5 (L) 14.0 - 18.0 g/dL Final     Hematocrit   Date/Time Value Ref Range Status   10/16/2024 04:14 AM 31.2 (L) 40.0 - 54.0 % Final       Sodium   Date/Time Value Ref Range Status   10/16/2024 04:14  136 - 145 mmol/L Final     Potassium   Date/Time Value Ref Range Status   10/16/2024 04:14 AM 3.1 (L) 3.5 - 5.1 mmol/L Final     Glucose   Date/Time Value Ref Range Status   10/16/2024 04:14  70 - 110 mg/dL Final          Assessment:        Right hip greater trochanteric fracture, closed, minimally displaced  Parkinson's disease  Falls          DISCUSSION:   Patient's symptoms, imaging, diagnosis and prognosis reviewed and discussed.  Discussed healing progression. Case with attending hospitalist.    Patient given an opportunity to ask questions.  When his questions, were answered, the following plan was  adopted.    OK for discharge for ortho standpoint    Plan:          Weight bearing:    Right hip As Tolerated   Ice right hip     Recommend continued work with PT  Ortho Trauma to follow as inpatient             Elidia Powers, DO, CAQSM, FAOAO  Orthopaedic Surgeon

## 2024-10-16 NOTE — SUBJECTIVE & OBJECTIVE
Interval History: ortho recommended WBAT. Participating in therapy, walked with walker.      Review of Systems   Constitutional:  Positive for activity change and appetite change. Negative for chills, diaphoresis, fatigue and fever.   HENT:  Negative for congestion, nosebleeds, sore throat and trouble swallowing.    Eyes:  Negative for pain, discharge and visual disturbance.   Respiratory:  Negative for apnea, cough, chest tightness, shortness of breath, wheezing and stridor.    Cardiovascular:  Negative for chest pain, palpitations and leg swelling.   Gastrointestinal:  Negative for abdominal distention, abdominal pain, blood in stool, constipation, diarrhea, nausea and vomiting.   Endocrine: Negative for cold intolerance and heat intolerance.   Genitourinary:  Negative for difficulty urinating, dysuria, flank pain, frequency and urgency.   Musculoskeletal:  Positive for arthralgias (right hip pain) and gait problem. Negative for back pain, joint swelling, myalgias, neck pain and neck stiffness.   Skin:  Negative for rash and wound.   Allergic/Immunologic: Negative for food allergies and immunocompromised state.   Neurological:  Negative for dizziness, seizures, syncope, facial asymmetry, weakness, light-headedness and headaches.   Hematological:  Negative for adenopathy.   Psychiatric/Behavioral:  Negative for agitation, behavioral problems and confusion. The patient is not nervous/anxious.      Objective:     Vital Signs (Most Recent):  Temp: 98.7 °F (37.1 °C) (10/16/24 1141)  Pulse: 79 (10/16/24 1300)  Resp: 20 (10/16/24 1141)  BP: (!) 181/81 (10/16/24 1300)  SpO2: 97 % (10/16/24 1141) Vital Signs (24h Range):  Temp:  [97.8 °F (36.6 °C)-98.7 °F (37.1 °C)] 98.7 °F (37.1 °C)  Pulse:  [60-88] 79  Resp:  [18-20] 20  SpO2:  [96 %-98 %] 97 %  BP: (155-211)/(74-96) 181/81     Weight: 57.1 kg (125 lb 14.1 oz)  Body mass index is 18.06 kg/m².    Intake/Output Summary (Last 24 hours) at 10/16/2024 151  Last data filed at  10/16/2024 0749  Gross per 24 hour   Intake 240 ml   Output 550 ml   Net -310 ml         Physical Exam  Vitals and nursing note reviewed.   Constitutional:       General: He is not in acute distress.     Appearance: He is well-developed. He is not diaphoretic.   HENT:      Head: Normocephalic and atraumatic.      Nose: Nose normal.   Eyes:      General: No scleral icterus.     Conjunctiva/sclera: Conjunctivae normal.   Cardiovascular:      Rate and Rhythm: Normal rate and regular rhythm.      Heart sounds: Normal heart sounds. No murmur heard.     No friction rub. No gallop.   Pulmonary:      Effort: Pulmonary effort is normal. No respiratory distress.      Breath sounds: Normal breath sounds. No stridor. No wheezing or rales.   Chest:      Chest wall: No tenderness.   Abdominal:      General: Bowel sounds are normal. There is no distension.      Palpations: Abdomen is soft.      Tenderness: There is no abdominal tenderness. There is no guarding or rebound.   Musculoskeletal:         General: Tenderness (right hip) present. No deformity. Normal range of motion.      Cervical back: Normal range of motion and neck supple.   Skin:     General: Skin is warm and dry.      Coloration: Skin is not pale.      Findings: No erythema or rash.   Neurological:      Mental Status: He is alert and oriented to person, place, and time.      Cranial Nerves: No cranial nerve deficit.      Motor: No abnormal muscle tone.      Coordination: Coordination normal.      Deep Tendon Reflexes: Reflexes are normal and symmetric.   Psychiatric:         Behavior: Behavior normal.         Thought Content: Thought content normal.         Cognition and Memory: Cognition is impaired.             Significant Labs: All pertinent labs within the past 24 hours have been reviewed.    Significant Imaging: I have reviewed all pertinent imaging results/findings within the past 24 hours.

## 2024-10-16 NOTE — PT/OT/SLP PROGRESS
Speech Language Pathology Treatment    Patient Name:  Jose Long   MRN:  [id number]  Admitting Diagnosis: Closed displaced intertrochanteric fracture of right femur    Recommendations:                 General Recommendations:   Post-acute ST intervention s/t parkinson's hx. No further acute ST indicated at this time.  Diet recommendations:  Soft & Bite Sized Diet - IDDSI Level 6, Liquid Diet Level: Thin liquids - IDDSI Level 0   Aspiration Precautions: Frequent oral care, HOB to 90 degrees, Small bites/sips, and Standard aspiration precautions   General Precautions: Standard, aspiration  Communication strategies:  provide increased time to answer    Assessment:     Jose Long is a 68 y.o. male admitted to [facility] acute with dx closed displaced intertrochanteric fracture of right femur s/p recurrent falls.  He presents with adequate JESSE and ability to communicate acute needs.  Speech is characteristic of Parkinson's disease with slowed rate and decreased vocal intensity with baseline cognitive deficits s/t dementia.  He would benefit from continued ST intervention post-acute for dysphagia management and to improve communicative effectiveness.  He is consuming IDDSI 6-soft/bite sized solids and IDDSI 0-thin liquids without incident. No further acute needs at this time.  MD to reconsult if needed.    Subjective     Pt seen bedside for ST.  Waiting for toileting assist/clean up. No c/o pain.  Pending rehab placement.  No family present.  Patient goals: rehab     Pain/Comfort:  Pain Rating 1: 0/10  Pain Rating Post-Intervention 1: 0/10  Pain Rating 2: 0/10  Pain Rating Post-Intervention 2: 0/10    Objective:     Has the patient been evaluated by SLP for swallowing?   Yes  Keep patient NPO? No   Current Respiratory Status: RA    Pt is consuming IDDSI 6-soft/bite sized solids and IDDSI 0-thin liquids without overt s/s of aspiration.  Improved efficiency with soft solids.    Pt awake/alert with baseline cognitive  impairment s/t parkinson's, dementia. engaged in functional conversation and able to communicate acute needs/desires.     Goals: Adequate for transition of post-acute care.  Multidisciplinary Problems       SLP Goals       Not on file              Multidisciplinary Problems (Resolved)          Problem: SLP    Goal Priority Disciplines Outcome   SLP Goal   (Resolved)     SLP Met   Description: TPW tolerate least restrictive PO diet   TPW engage in ongoing motor speech and cognitive-linguistic assessment                        Plan:     Patient to be seen:  2 x/week, 3 x/week   Plan of Care expires:  10/22/24  Plan of Care reviewed with:  patient   SLP Follow-Up:  No (Recommended continued ST at rehab.)       Discharge recommendations:  High Intensity Therapy   Barriers to Discharge:  None    Time Tracking:     SLP Treatment Date:   10/16/24  Speech Start Time:  1300  Speech Stop Time:  1315     Speech Total Time (min):  15 min    Billable Minutes: Treatment Swallowing Dysfunction 15 minutes    10/16/2024

## 2024-10-16 NOTE — PT/OT/SLP PROGRESS
"Physical Therapy  Treatment    Jose Long   MRN: [id number]   Admitting Diagnosis: Closed displaced intertrochanteric fracture of right femur    PT Received On: 10/16/24  PT Start Time: 0740     PT Stop Time: 0805    PT Total Time (min): 25 min       Billable Minutes:  Gait Training 15 and Therapeutic Exercise 10    Treatment Type: Treatment  PT/PTA: PT     Number of PTA visits since last PT visit: 0       General Precautions: Standard, fall  Orthopedic Precautions: RLE weight bearing as tolerated  Braces: N/A  Respiratory Status: Room air         Subjective:  Communicated with MD AND [facility] CHART REVIEW  prior to session.  PT AGREED TO TX. " MY BRAIN ISNT WORKING RIGHT THIS AM."    Pain/Comfort  Pain Rating 1: 5/10  Location - Side 1: Right  Location 1: hip  Pain Addressed 1: Reposition  Pain Rating Post-Intervention 1: 5/10    Objective:   Patient found with: peripheral IV, telemetry    Functional Mobility:  PT MET IN RM SUP IN BED. PT REPORTED MILD PAIN. PT LOG ROLLED TO LEFT WITH SBA AND SEATED EOB WITH MIN A . PT SCOOTING ON BED WITH CGA AND POST LEAN WITH TACTILE AND VERBAL CUES FOR ANT WT SHIFT. PT STOOD WITH RW AND MIN A FOR GT TRAINING. PT GT TRAINED X 45' WITH STEP TO GT WITH MIN A AND INC CUES FOR STRIDE AND INITIATION OF GT. PT RETURNED TO RM T/F TO CHAIR WITH RW AND MODA WITH INC DIFFICULTY WITH ECCENTRIC CONTROL TO CHAIR. PT SCOOT IN CHAIR WITH MIN A AND INC TIME TO COMPLETE     Treatment and Education:  PT COMPLETED AP, TKE AND MIP WITH LIMITED ROM X 10 REPS FOR LE ROM AND STRENGTHENING. PT EDUCATED ON "CALL DON'T FALL", ENCOURAGED TO CALL FOR ASSISTANCE WITH ALL NEEDS FOR OOB MOBILITY. PT NEIGHBOR EDUCATED TO CALL FOR PT ASSIST BACK TO BED SHOULD HE NEED TO LEAVE THE ROOM. PT FRIEND AGREED.      AM-PAC 6 CLICK MOBILITY  How much help from another person does this patient currently need?   1 = Unable, Total/Dependent Assistance  2 = A lot, Maximum/Moderate Assistance  3 = A little, Minimum/Contact " Guard/Supervision  4 = None, Modified [place]/Independent    Sitting down on and standing up from a chair with arms (e.g., wheelchair, bedside commode, etc.): 3  Moving from lying on back to sitting on the side of the bed?: 3  Moving to and from a bed to a chair (including a wheelchair)?: 3  Need to walk in hospital room?: 3  Climbing 3-5 steps with a railing?: 1    AM-PAC Raw Score CMS G-Code Modifier Level of Impairment Assistance   6 % Total / Unable   7 - 9 CM 80 - 100% Maximal Assist   10 - 14 CL 60 - 80% Moderate Assist   15 - 19 CK 40 - 60% Moderate Assist   20 - 22 CJ 20 - 40% Minimal Assist   23 CI 1-20% SBA / CGA   24 CH 0% Independent/ Mod I     Patient left up in chair with call button in reach, chair alarm on, and FRIEND present.    Assessment:  PT PROGRESSING WITH GT AND GROSS FUNC MOBILITY      Rehab identified problem list/impairments: weakness, impaired endurance, impaired self care skills, impaired functional mobility, gait instability, impaired balance, pain, decreased safety awareness, decreased upper extremity function, decreased lower extremity function, decreased coordination, impaired cognition, decreased ROM, impaired muscle length, orthopedic precautions    Rehab potential is good.    Activity tolerance: Fair    Discharge recommendations: High Intensity Therapy      Barriers to discharge:      Equipment recommendations: to be determined by next level of care     GOALS:   Multidisciplinary Problems       Physical Therapy Goals          Problem: Physical Therapy    Goal Priority Disciplines Outcome Interventions   Physical Therapy Goal     PT, PT/OT Progressing    Description: LTG: 10/29/24  1. PT WILL COMPLETE BED MOBILITY WITH ABNER   2. PT WILL STAND T/F TO CHAIR WITH RW AND MIN A  3. PT WILL GT TRAIN X 50' WITH RW AND CGA TO PROGRESS GT.  4. PT WILL INC AMPAC SCORE BY 2 POINTS TO PROGRESS GROSS FUNC MOBILITY.                          PLAN:    Patient to be seen 3 x/week to  address the above listed problems via gait training, therapeutic activities, therapeutic exercises  Plan of Care expires: 10/29/24  Plan of Care reviewed with: patient, friend         10/16/2024

## 2024-10-16 NOTE — CONSULTS
10/16/24 0837   Post-Acute Status   Post-Acute Authorization Placement  (IP Rehab)   Post-Acute Placement Status Pending payor review/awaiting authorization (if required)   Coverage BCBS - Commercial   Discharge Delays None known at this time   Discharge Plan   Discharge Plan A Rehab     Per Nanci, with [facility], NP and PT/OT working with Patient believe he is Rehab appropriate. Therapy has changed their recommendations to High Intense Therapy and NP in agreement to IP Rehab.  Per Nanci, [facility] is submitting for insurance authorization today.     SW will continue to follow and assist as needed.

## 2024-10-16 NOTE — PLAN OF CARE
10/16/24 1551   Post-Acute Status   Post-Acute Authorization Placement  (IP Rehab)   Post-Acute Placement Status Set-up Complete/Auth obtained   Coverage BCBS - Commercial   Discharge Delays None known at this time   Discharge Plan   Discharge Plan A Rehab       Per Nanci, with [facility], Patient has insurance authorization to admit to them.  Per Nanci, they can plan to admit tomorrow.   SW will update MD for DC tomorrow.    SW will continue to follow and assist as needed.

## 2024-10-16 NOTE — ASSESSMENT & PLAN NOTE
RCRI revealed class 1 risk with 3.9% 30 day risk of death, mi, cardiac arrest.  Ortho consulted for surgical repair in a.m..  Plan:  -NPO  -Continue current pain regimen, titrate as needed  -Bowel regimen  -Bedrest  -None weightbearing RLE  -Continue splint per ortho recs  -IVFs prn  -Antiemetics prn  -Tylenol as needed for fever   -PT/OT   -preop labs and imaging    10/15/24: Orthopedics was consulted and does not plan for surgery today. She will be reviewing films with her colleagues and make determination after. She recommended WBAT. Pt does have hx of several falls in last few weeks. He has parkinson's disease and would benefit from inpatient rehab. His neighbor only provides intermittent supervision. According to PT/OT notes, pt requires 24 assistance and requires moderate assistance for most activities. Speech therapy evaluated pt and recommends aspiration precautions and continued speech therapy.     10/16/24: CM consulted for inpatient rehab. Pt tolerating therapy well

## 2024-10-17 VITALS
SYSTOLIC BLOOD PRESSURE: 159 MMHG | BODY MASS INDEX: 18.08 KG/M2 | HEART RATE: 61 BPM | TEMPERATURE: 98 F | HEIGHT: 70 IN | OXYGEN SATURATION: 98 % | WEIGHT: 126.31 LBS | RESPIRATION RATE: 16 BRPM | DIASTOLIC BLOOD PRESSURE: 80 MMHG

## 2024-10-17 DIAGNOSIS — M25.551 PAIN OF RIGHT HIP: Primary | ICD-10-CM

## 2024-10-17 PROBLEM — E44.0 MODERATE PROTEIN-CALORIE MALNUTRITION: Status: ACTIVE | Noted: 2024-10-17

## 2024-10-17 LAB
ALBUMIN SERPL BCP-MCNC: 3 G/DL (ref 3.5–5.2)
ALP SERPL-CCNC: 137 U/L (ref 55–135)
ALT SERPL W/O P-5'-P-CCNC: <5 U/L (ref 10–44)
ANION GAP SERPL CALC-SCNC: 11 MMOL/L (ref 8–16)
AST SERPL-CCNC: 27 U/L (ref 10–40)
BASOPHILS # BLD AUTO: 0.03 K/UL (ref 0–0.2)
BASOPHILS NFR BLD: 0.4 % (ref 0–1.9)
BILIRUB SERPL-MCNC: 0.6 MG/DL (ref 0.1–1)
BUN SERPL-MCNC: 16 MG/DL (ref 8–23)
CALCIUM SERPL-MCNC: 8.6 MG/DL (ref 8.7–10.5)
CHLORIDE SERPL-SCNC: 104 MMOL/L (ref 95–110)
CO2 SERPL-SCNC: 27 MMOL/L (ref 23–29)
CREAT SERPL-MCNC: 1.1 MG/DL (ref 0.5–1.4)
DIFFERENTIAL METHOD BLD: ABNORMAL
EOSINOPHIL # BLD AUTO: 0.2 K/UL (ref 0–0.5)
EOSINOPHIL NFR BLD: 2.4 % (ref 0–8)
ERYTHROCYTE [DISTWIDTH] IN BLOOD BY AUTOMATED COUNT: 12.3 % (ref 11.5–14.5)
EST. GFR  (NO RACE VARIABLE): >60 ML/MIN/1.73 M^2
GLUCOSE SERPL-MCNC: 80 MG/DL (ref 70–110)
HCT VFR BLD AUTO: 30.7 % (ref 40–54)
HGB BLD-MCNC: 10.3 G/DL (ref 14–18)
IMM GRANULOCYTES # BLD AUTO: 0.01 K/UL (ref 0–0.04)
IMM GRANULOCYTES NFR BLD AUTO: 0.1 % (ref 0–0.5)
LYMPHOCYTES # BLD AUTO: 1.4 K/UL (ref 1–4.8)
LYMPHOCYTES NFR BLD: 19.4 % (ref 18–48)
MCH RBC QN AUTO: 30.7 PG (ref 27–31)
MCHC RBC AUTO-ENTMCNC: 33.6 G/DL (ref 32–36)
MCV RBC AUTO: 92 FL (ref 82–98)
MONOCYTES # BLD AUTO: 0.7 K/UL (ref 0.3–1)
MONOCYTES NFR BLD: 9.2 % (ref 4–15)
NEUTROPHILS # BLD AUTO: 4.9 K/UL (ref 1.8–7.7)
NEUTROPHILS NFR BLD: 68.5 % (ref 38–73)
NRBC BLD-RTO: 0 /100 WBC
PLATELET # BLD AUTO: 282 K/UL (ref 150–450)
PMV BLD AUTO: 9.9 FL (ref 9.2–12.9)
POTASSIUM SERPL-SCNC: 3.8 MMOL/L (ref 3.5–5.1)
PROT SERPL-MCNC: 5.8 G/DL (ref 6–8.4)
RBC # BLD AUTO: 3.35 M/UL (ref 4.6–6.2)
SODIUM SERPL-SCNC: 142 MMOL/L (ref 136–145)
WBC # BLD AUTO: 7.1 K/UL (ref 3.9–12.7)

## 2024-10-17 PROCEDURE — 36415 COLL VENOUS BLD VENIPUNCTURE: CPT | Performed by: NURSE PRACTITIONER

## 2024-10-17 PROCEDURE — 97530 THERAPEUTIC ACTIVITIES: CPT

## 2024-10-17 PROCEDURE — 80053 COMPREHEN METABOLIC PANEL: CPT | Performed by: NURSE PRACTITIONER

## 2024-10-17 PROCEDURE — 25000003 PHARM REV CODE 250: Performed by: NURSE PRACTITIONER

## 2024-10-17 PROCEDURE — 99233 SBSQ HOSP IP/OBS HIGH 50: CPT | Mod: ,,, | Performed by: ORTHOPAEDIC SURGERY

## 2024-10-17 PROCEDURE — 85025 COMPLETE CBC W/AUTO DIFF WBC: CPT | Performed by: NURSE PRACTITIONER

## 2024-10-17 PROCEDURE — 25000003 PHARM REV CODE 250: Performed by: STUDENT IN AN ORGANIZED HEALTH CARE EDUCATION/TRAINING PROGRAM

## 2024-10-17 PROCEDURE — 97116 GAIT TRAINING THERAPY: CPT

## 2024-10-17 RX ORDER — AMLODIPINE AND BENAZEPRIL HYDROCHLORIDE 10; 20 MG/1; MG/1
1 CAPSULE ORAL DAILY
Status: DISCONTINUED | OUTPATIENT
Start: 2024-10-17 | End: 2024-10-17 | Stop reason: CLARIF

## 2024-10-17 RX ORDER — LACTULOSE 10 G/15ML
10 SOLUTION ORAL DAILY
Start: 2024-10-17

## 2024-10-17 RX ORDER — LISINOPRIL 20 MG/1
20 TABLET ORAL DAILY
Status: DISCONTINUED | OUTPATIENT
Start: 2024-10-17 | End: 2024-10-17 | Stop reason: HOSPADM

## 2024-10-17 RX ORDER — ENOXAPARIN SODIUM 100 MG/ML
40 INJECTION SUBCUTANEOUS EVERY 24 HOURS
Start: 2024-10-17 | End: 2024-11-16

## 2024-10-17 RX ORDER — AMLODIPINE BESYLATE 10 MG/1
10 TABLET ORAL DAILY
Status: DISCONTINUED | OUTPATIENT
Start: 2024-10-17 | End: 2024-10-17 | Stop reason: HOSPADM

## 2024-10-17 RX ADMIN — AMLODIPINE BESYLATE 10 MG: 10 TABLET ORAL at 09:10

## 2024-10-17 RX ADMIN — MEMANTINE 10 MG: 10 TABLET ORAL at 09:10

## 2024-10-17 RX ADMIN — LACTULOSE 10 G: 20 SOLUTION ORAL at 09:10

## 2024-10-17 RX ADMIN — LISINOPRIL 20 MG: 20 TABLET ORAL at 09:10

## 2024-10-17 RX ADMIN — HYDROCODONE BITARTRATE AND ACETAMINOPHEN 1 TABLET: 5; 325 TABLET ORAL at 04:10

## 2024-10-17 RX ADMIN — FAMOTIDINE 20 MG: 20 TABLET ORAL at 09:10

## 2024-10-17 RX ADMIN — SENNOSIDES AND DOCUSATE SODIUM 1 TABLET: 50; 8.6 TABLET ORAL at 09:10

## 2024-10-17 RX ADMIN — CARBIDOPA AND LEVODOPA 1 TABLET: 10; 100 TABLET ORAL at 09:10

## 2024-10-17 RX ADMIN — Medication 200 ML: at 09:10

## 2024-10-17 NOTE — CONSULTS
[facility] - Telemetry ([facility])  Adult Nutrition  Consult Note    SUMMARY     Recommendations    Recommendation/Intervention:   1. Recommend pt continues on a Cardiac, Soft and bite-sized (IDDSI Level 6) diet   2. Recommend pt continues Boost plus TID to assist filling nutritional gaps   3. Encourage PO intake, recommed feeding assistane   4. Weigh twice weekly    Goals:   1. Pt will tolerate and consume >75% EEN and EPN prior to RD follow up  Nutrition Goal Status: new  Communication of RD Recs: other (comment) (POC, sticky note)    Assessment and Plan    Endocrine  Moderate protein-calorie malnutrition  Malnutrition Type:  Context: chronic illness  Level: moderate    Related to (etiology):   Physiological causes increasing nutrient needs d/t illness  Psychological causes     Signs and Symptoms (as evidenced by):   BMI< 22 (older adult)  Underweight with loss of fat and muscle  Lack of interest in food     Malnutrition Characteristic Summary:  Energy Intake (Malnutrition): less than 75% for greater than or equal to 1 month  Subcutaneous Fat (Malnutrition): moderate depletion  Muscle Mass (Malnutrition): severe depletion    Interventions/Recommendations (treatment strategy):  1. Decreased sodium and fat, IDDSI Soft and bite-sized level 6 (blue) texture modified diet   2. Commercial beverage medical food supplement therapy   3. Feeding assistance management   4. Collaboration by nutrition professional with other providers     Nutrition Diagnosis Status:   New         Malnutrition Assessment (10/17/24):  Malnutrition Context: chronic illness  Malnutrition Level: moderate  Skin (Micronutrient): none (Gunnar score = 16 (mild risk)       Energy Intake (Malnutrition): less than 75% for greater than or equal to 1 month  Subcutaneous Fat (Malnutrition): moderate depletion  Muscle Mass (Malnutrition): severe depletion   Orbital Region (Subcutaneous Fat Loss): moderate depletion (Slightly darker circles; Somewhat hallow  "look)  Upper Arm Region (Subcutaneous Fat Loss): moderate depletion (Some depth pinch but ot ample; Fingers almost touch)   [place] Region (Muscle Loss): severe depletion (Hollow, scooping depression)  Clavicle Bone Region (Muscle Loss): severe depletion (Protruding, prominent bone)  Clavicle and Acromion Bone Region (Muscle Loss): moderate depletion (Acromion process may slightly protrude)  Patellar Region (Muscle Loss): moderate depletion (Knee cap less prominent, more rounded)  Anterior Thigh Region (Muscle Loss): moderate depletion (Mild depression on inner thigh)  Posterior Calf Region (Muscle Loss): moderate depletion (Not well developed)                 Reason for Assessment    Reason For Assessment: consult, identified at risk by screening criteria (Patient identified as at risk of developing a pressure injury)  Diagnosis:  (Closed displaced intertrochanteric fracture of right femur)  General Information Comments:   10/17/24: 68 y.o. Male admitted for Closed displaced intertrochanteric fracture of R femur. PMH: HTN, DLD, Alzheimers dementia, Parkinson disease, Anemia, Frequent falls. Pt currently on a Cardiac, Soft and bite-sized (IDDSI Level 6) diet. RD consulted for pt at [facility] of developing PI/ MST. H&P noted that the pt presented to the ED d/t R hip pain following a fall x ~1 week PTA, pt's  son reported that the pt has fallen once a week x past month, referred to ED by PCP after imagining, noted pt has hx of "parkinson's, dementia, and alzheimer's which effect his mental status and fatigue regularly w/ exertion". EMR noted Orthopedics consulted, no plan for surgery but recommended WBAT, pt  tolerating therapy well, SLP noted 10/16/24 that the pt is recommended for a Soft and bite sized diet. Visited pt at bedside, pt resting in bed, pt's family member present. Pt reported an "ok" appetite, stated he eats whenever he gets hungry at home, pt's family member reported pt eats small meals throughout the day, " "RD encouraged PO intake, discussed protein/calorie benefits of Boost plus as snacks, pt stated he has tried in the past, receptive to try chocolate flavor, RD added to pt's orders and trays. Pt denied N/V/D, abd pain or chewing/swallowing difficulties. Pt confirmed that he has access to adequate and healthy foods at home, reported his UBW is 125 lbs, pt's family member confirmed.  NFPE performed, moderate/severe malnutrition noted. Reviewed chart: 50% PO intake; LBM 10/16; Skin: WDL; Gunnar score: 16 (mild risk); Edema: none. Labs, meds, weight reviewed. Note lactulose. Weight charted 4/26/24 132 lbs, 10/17/24 126 lbs (BMI 18.13, protein-energy malnutrition grade I), -6 lb wt loss (5% wt change) x~ 6 months, insignificant wt loss. RD will continue to follow and monitor pt's nutritional status during admit.    Nutrition Discharge Planning: Cardiac, Soft and bite-sized (IDDSI Level 6) diet + Boost plus as warranted    Nutrition Risk Screen    Nutrition Risk Screen: no indicators present    Nutrition Related Social Determinants of Health: SDOH: Adequate food in home environment    Nutrition/Diet History    Spiritual, Cultural Beliefs, Orthodoxy Practices, Values that Affect Care: no  Food Allergies: NKFA  Factors Affecting Nutritional Intake: decreased appetite    Anthropometrics    Temp: 98.3 °F (36.8 °C)  Height Method: Stated  Height: 5' 10" (177.8 cm)  Height (inches): 70 in  Weight Method: Bed Scale  Weight: 57.3 kg (126 lb 5.2 oz)  Weight (lb): 126.32 lb  Ideal Body Weight (IBW), Male: 166 lb  % Ideal Body Weight, Male (lb): 76.1 %  % Ideal Body Weight Malnutrition: 70-79%: moderate deficit  BMI (Calculated): 18.1  BMI Grade: 17 - 18.4 protein-energy malnutrition grade I     Wt Readings from Last 15 Encounters:   10/17/24 57.3 kg (126 lb 5.2 oz)   10/14/24 58.2 kg (128 lb 4.9 oz)   08/13/24 58.2 kg (128 lb 4.9 oz)   05/31/24 62.1 kg (136 lb 12.7 oz)   05/01/24 61.3 kg (135 lb 0.5 oz)   04/26/24 60.1 kg (132 lb " "7.9 oz)   03/23/24 63.8 kg (140 lb 10.5 oz)   03/06/24 63.8 kg (140 lb 10.5 oz)   03/06/24 63.5 kg (140 lb)   02/27/24 63.2 kg (139 lb 5.3 oz)   02/13/24 60.9 kg (134 lb 4.2 oz)   11/07/23 62.6 kg (138 lb 0.1 oz)   10/05/23 63.8 kg (140 lb 10.5 oz)   05/09/23 62.6 kg (138 lb 0.1 oz)   04/05/23 66.1 kg (145 lb 11.6 oz)     Lab/Procedures/Meds    Pertinent Labs Reviewed: reviewed  Pertinent Labs Comments: Calcium (L), Total protein (L), Albumin (L), AST (L), Alk phos (H)  Pertinent Medications Reviewed: reviewed  Pertinent Medications Comments: senna-docusate, memantine, lisinopril, lactulose, famotidine, enoxaparin, Pedialyte, carbidopa-levodopa, amlodpine-benaepril, amlodipine    BMP  Lab Results   Component Value Date     10/17/2024    K 3.8 10/17/2024     10/17/2024    CO2 27 10/17/2024    BUN 16 10/17/2024    CREATININE 1.1 10/17/2024    CALCIUM 8.6 (L) 10/17/2024    ANIONGAP 11 10/17/2024    EGFRNORACEVR >60 10/17/2024     Lab Results   Component Value Date    CALCIUM 8.6 (L) 10/17/2024    PHOS 2.8 10/16/2024     Lab Results   Component Value Date    ALBUMIN 3.0 (L) 10/17/2024     Lab Results   Component Value Date    ALT <5 (L) 10/17/2024    AST 27 10/17/2024    ALKPHOS 137 (H) 10/17/2024    BILITOT 0.6 10/17/2024     No results for input(s): "POCTGLUCOSE" in the last 24 hours.  Lab Results   Component Value Date    HGBA1C 5.4 06/08/2021     Lab Results   Component Value Date    WBC 7.10 10/17/2024    HGB 10.3 (L) 10/17/2024    HCT 30.7 (L) 10/17/2024    MCV 92 10/17/2024     10/17/2024       Scheduled Meds:   amLODIPine  10 mg Oral Daily    carbidopa-levodopa  mg  1 tablet Oral TID    electrolytes-dextrose  200 mL Oral Q4H    enoxparin  40 mg Subcutaneous Q24H (prophylaxis, 1700)    famotidine  20 mg Oral Daily    lactulose  10 g Oral Daily    lisinopriL  20 mg Oral Daily    memantine  10 mg Oral BID    senna-docusate 8.6-50 mg  1 tablet Oral BID     Continuous Infusions:  PRN " Meds:.  Current Facility-Administered Medications:     acetaminophen, 650 mg, Rectal, Q4H PRN    acetaminophen, 650 mg, Oral, Q8H PRN    aluminum-magnesium hydroxide-simethicone, 30 mL, Oral, QID PRN    dextrose 10%, 12.5 g, Intravenous, PRN    dextrose 10%, 25 g, Intravenous, PRN    glucagon (human recombinant), 1 mg, Intramuscular, PRN    glucose, 16 g, Oral, PRN    glucose, 24 g, Oral, PRN    hydrALAZINE, 10 mg, Intravenous, Q6H PRN    HYDROcodone-acetaminophen, 1 tablet, Oral, Q6H PRN    influenza (adjuvanted), 0.5 mL, Intramuscular, vaccine x 1 dose    melatonin, 6 mg, Oral, Nightly PRN    morphine, 2 mg, Intravenous, Q4H PRN    naloxone, 0.02 mg, Intravenous, PRN    ondansetron, 4 mg, Intravenous, Q8H PRN    polyethylene glycol, 17 g, Oral, Daily PRN    simethicone, 1 tablet, Oral, QID PRN    sodium chloride 0.9%, 3 mL, Intravenous, Q12H PRN      Physical Findings/Assessment         Estimated/Assessed Needs    Weight Used For Calorie Calculations: 57.3 kg (126 lb 5.2 oz)  Energy Calorie Requirements (kcal): 1534-8121 kcals (30-35 kcals/kg ABW (Underweight)  Energy Need Method: Kcal/kg  Protein Requirements: 68-86 g (1.2-1.5 g/kg ABW (Underweight)  Weight Used For Protein Calculations: 57.3 kg (126 lb 5.2 oz)  Fluid Requirements (mL): 4470-9385 mL (1 mL/kcal)  Estimated Fluid Requirement Method: RDA Method  RDA Method (mL): 1719  CHO Requirement: 214-251 g (3918-9035 kcals/8)      Nutrition Prescription Ordered    Current Diet Order: Cardiac, Soft and bite-sized (IDDSI Level 6) diet  Oral Nutrition Supplement: Boost plus TID    Evaluation of Received Nutrient/Fluid Intake  I/O: (Net since admit):   10/17/24: -320 mL    Energy Calories Required: not meeting needs  Protein Required: not meeting needs  Fluid Required: not meeting needs  Total Fluid Intake (mL): 440  Tolerance: tolerating  % Intake of Estimated Energy Needs: 50 - 75 %  % Meal Intake: 50 - 75 %    Nutrition Risk    Level of Risk/Frequency of  Follow-up: high (F/u x 2 weekly)       Monitor and Evaluation    Food and Nutrient Intake: energy intake, food and beverage intake  Food and Nutrient Adminstration: diet order  Knowledge/Beliefs/Attitudes: food and nutrition knowledge/skill, beliefs and attitudes  Physical Activity and Function: factors affecting access to physical activity  Anthropometric Measurements: weight, weight change, body mass index  Biochemical Data, Medical Tests and Procedures: electrolyte and renal panel, gastrointestinal profile  Nutrition-Focused Physical Findings: overall appearance       Nutrition Follow-Up    RD Follow-up?: Yes  Calli Limon, PATRICK, RDN, LDN

## 2024-10-17 NOTE — PLAN OF CARE
[facility] - Telemetry (Hospital)  Discharge Final Note    Primary Care Provider: Harshad Hernandez MD    Expected Discharge Date: 10/17/2024    Final Discharge Note (most recent)       Final Note - 10/17/24 0841          Final Note    Assessment Type Final Discharge Note     Anticipated Discharge Disposition Rehab Facility        Post-Acute Status    Post-Acute Authorization Placement     Post-Acute Placement Status Set-up Complete/Auth obtained     Coverage BCBS - Commercial     Discharge Delays None known at this time                     Important Message from Medicare             DC Disposition: [facility]  Family Notified: Patient and neighbor at bedside  Transportation: [facility], 11 am    Patient needed Inpatient Rehab placement upon DC. [facility] and [facility]. HAILEY sent referral for review and insurance approval. Nanci received approval and gave SW number for nurse report. HAILEY messages nurse with number for report. HAILEY will coordinate with Nurses and [facility] for transportation. [facility] has transportation set up for 11 am.

## 2024-10-17 NOTE — PLAN OF CARE
Nutrition Recommendations/Interventions for malnutrition 10/17/24:   1. Recommend pt continues on a Cardiac, Soft and bite-sized (IDDSI Level 6) diet   2. Recommend pt continues Boost plus TID to assist filling nutritional gaps   3. Encourage PO intake, recommed feeding assistane   4. Weigh twice weekly  5. Collaboration by nutrition professional with other providers    Goals:   1. Pt will tolerate and consume >75% EEN and EPN prior to RD follow up    PATRICK Caballero, RDN, LDN

## 2024-10-17 NOTE — ASSESSMENT & PLAN NOTE
Malnutrition Type:  Context: chronic illness  Level: moderate    Related to (etiology):   Physiological causes increasing nutrient needs d/t illness  Psychological causes     Signs and Symptoms (as evidenced by):   BMI< 22 (older adult)  Underweight with loss of fat and muscle  Lack of interest in food     Malnutrition Characteristic Summary:  Energy Intake (Malnutrition): less than 75% for greater than or equal to 1 month  Subcutaneous Fat (Malnutrition): moderate depletion  Muscle Mass (Malnutrition): severe depletion    Interventions/Recommendations (treatment strategy):  1. Decreased sodium and fat, IDDSI Soft and bite-sized level 6 (blue) texture modified diet   2. Commercial beverage medical food supplement therapy   3. Feeding assistance management   4. Collaboration by nutrition professional with other providers     Nutrition Diagnosis Status:   New

## 2024-10-17 NOTE — DISCHARGE SUMMARY
[facility] ([facility])  [facility] Medicine  Discharge Summary      Patient Name: Jose Long  MRN: [id number]  BENITA: [id number]  Patient Class: IP- Inpatient  Admission Date: 10/14/2024  Hospital Length of Stay: 3 days  Discharge Date and Time: 10/17/2024 11:10 AM  Attending Physician:Dr. Darden  Discharging Provider: Narcisa Alba NP  Primary Care Provider: Harshad Hernandez MD    Primary Care Team: Networked reference to record PCT     HPI:   Jose Long is a 68 y.o. male with a PMH  has a past medical history of Alzheimer's dementia, Dyslipidemia, Hypertension, and Multiple allergies.presented to the ED due to right hip pain following a fall about a week ago. Pt's son reports that the pt has fallen about once a week for the past month. Pt's sxs are constant and moderate in severity. Per the son, the pt was able to get up from the fall, but now needs assistance to get around and walk unlike before.  Patient was evaluated by his PCP earlier yesterday and had plain film imaging performed.  Patient was notified to proceed to the ER for further evaluation for possible right femur fracture.  Reports only mild discomfort in right hip.  Denies any other complaints at this time.  Pt has parkinson's, dementia, and alzheimer's which effect his mental status and fatigue regularly with exertion.     ER workup revealed CBC to be unremarkable.  CMP revealed potassium of 3.2.  UA unremarkable.  Plain film imaging of sacrum and lumbar spine negative for acute findings.  Plain film imaging of right femur revealed questionable nondisplaced intertrochanteric proximal femoral fracture.  CTA head without contrast revealed  Positive acute fracture right femur greater trochanter comminuted minimally displaced.  No joint malalignment.  Orthopedic provider consulted.  Recommend Hospital Medicine to admit for surgical repair in a.m..  Patient and family member in agreement with treatment plan.  Patient will be admitted  under inpatient status.      PCP: Harshad Hernandez          Hospital Course:   The patient is a 69 yo male with Parkinson's disease, HTN, and mild dementia who was admitted with a right hip closed minimally displaced greater trochanteric hip fracture. Orthopedics was consulted and does not plan surgery. She recommended WBAT. OK to discharge from Ortho standpoint.   Pt does have hx of several falls in last few weeks. He has parkinson's disease and would benefit from inpatient rehab. His neighbor only provides intermittent supervision. According to PT/OT notes, pt requires 24 assistance and requires moderate assistance for most activities. Speech therapy evaluated pt and recommends aspiration precautions and continued speech therapy. CM consulted for inpatient rehab placement and pt was accepted at  Rehab. The patient was seen and examined today and determined to be stable for discharge.       Goals of Care Treatment Preferences:  Code Status: DNR      SDOH Screening:  The patient was screened for utility difficulties, food insecurity, transport difficulties, housing insecurity, and interpersonal safety and there were no concerns identified this admission.     Consults:   Consults (From admission, onward)          Status Ordering Provider     Inpatient consult to Social Work  Once        Provider:  (Not yet assigned)    Completed STEVEN STOKES     Inpatient consult to Registered Dietitian/Nutritionist  Once        Provider:  (Not yet assigned)    Completed DERRICK LAI              Final Active Diagnoses:    Diagnosis Date Noted POA    PRINCIPAL PROBLEM:  Closed displaced intertrochanteric fracture of right femur [S72.141A] 10/15/2024 Yes    Moderate protein-calorie malnutrition [E44.0] 10/17/2024 Yes    Falls frequently [R29.6] 10/14/2024 Not Applicable    Anemia [D64.9] 02/27/2024 Yes    Alzheimer's dementia [G30.9, F02.80] 11/02/2022 Yes    Parkinson disease [G20.A1] 11/02/2022 Yes    Dyslipidemia [E78.5]   Yes    HTN (hypertension) [I10] 08/09/2013 Yes     Chronic      Problems Resolved During this Admission:       Discharged Condition: stable    Disposition: Rehab Facility    Follow Up:   Follow-up Information       Harshad Hernandez MD Follow up in 3 day(s).    Specialty: Internal Medicine  Contact information:  [address] [place] [address]  [phone]               [facility] CL[facility] Follow up in 2 week(s).                           Patient Instructions:      Ambulatory referral/consult to Orthopedics   Standing Status: Future   Referral Priority: Routine Referral Type: Consultation   Requested Specialty: Orthopedic Surgery   Number of Visits Requested: 1     Diet Cardiac     Activity as tolerated       Significant Diagnostic Studies:   Imaging Results              X-Ray Chest 1 View (Final result)  Result time 10/15/24 03:51:28      Final result by Lolly De Leon MD (10/15/24 03:51:28)                   Impression:      No acute findings.      Electronically signed by: Lolly De Leon  Date:    10/15/2024  Time:    03:51               Narrative:    EXAMINATION:  XR CHEST 1 VIEW    CLINICAL HISTORY:  preop;    TECHNIQUE:  Single frontal view of the chest was performed.    COMPARISON:  None    FINDINGS:  Lungs are clear. No focal consolidation. No pleural effusion. No pneumothorax. Normal heart size.                                       CT Hip Without Contrast Right (Final result)  Result time 10/14/24 22:45:10      Final result by Estefania Cardenas MD (10/14/24 22:45:10)                   Impression:      Positive acute fracture right femur greater trochanter comminuted minimally displaced.  No joint malalignment.    All CT scans at this facility use dose modulation, iterative reconstruction, and/or weight base dosing when appropriate to reduce radiation dose to as low as reasonably achievable.      Electronically signed by: Estefania Charles  Ronald  Date:    10/14/2024  Time:    22:45               Narrative:    EXAMINATION:  CT HIP WITHOUT CONTRAST RIGHT    CLINICAL HISTORY:  Fracture, hip;    TECHNIQUE:  1.25 mm axial noncontrast CT images were obtained through the cervical spine using soft tissue and bony algorithm.  Sagittal coronal reformats were also submitted for interpretation.    COMPARISON:  Radiograph comparison                                       CT Hip Without Contrast Left (Final result)  Result time 10/14/24 21:18:41      Final result by Estefania Cardenas MD (10/14/24 21:18:41)                   Impression:      Bones are demineralized.  No acute fracture or dislocation identified left hip.    Right hip radiographic comparison with nondisplaced inter trochanteric fracture which is not imaged on CT left hip    All CT scans at this facility use dose modulation, iterative reconstruction, and/or weight base dosing when appropriate to reduce radiation dose to as low as reasonably achievable.      Electronically signed by: Estefania Cardenas  Date:    10/14/2024  Time:    21:18               Narrative:    EXAMINATION:  CT HIP WITHOUT CONTRAST LEFT    CLINICAL HISTORY:  Fracture, hip;    TECHNIQUE:  1.25 mm axial noncontrast CT images were obtained through the cervical spine using soft tissue and bony algorithm.  Sagittal coronal reformats were also submitted for interpretation.    COMPARISON:  Radiographic comparison                                       Medications:  Reconciled Home Medications:      Medication List        START taking these medications      enoxaparin 40 mg/0.4 mL Syrg  Commonly known as: LOVENOX  Inject 0.4 mLs (40 mg total) into the skin Q24H (prophylaxis, 1700).     lactulose 20 gram/30 mL Soln  Commonly known as: CHRONULAC  Take 15 mLs (10 g total) by mouth once daily.  Replaces: CONSTULOSE 10 gram/15 mL solution            CHANGE how you take these medications      acetaminophen 650 MG Tbsr  Commonly known as:  TYLENOL ARTHRITIS PAIN  Take 1 tablet (650 mg total) by mouth every 8 (eight) hours as needed.  What changed:   medication strength  how much to take  when to take this            CONTINUE taking these medications      amlodipine-benazepril 10-20mg 10-20 mg per capsule  Commonly known as: LOTREL  Take 1 capsule by mouth once daily.     carbidopa-levodopa  mg  mg per tablet  Commonly known as: SINEMET  Take 1 tablet by mouth 3 (three) times daily.     cetirizine 10 MG tablet  Commonly known as: ZYRTEC  Take 1 tablet by mouth Daily. 1 Tablet Oral Every day.  To get over-the-counter     memantine 10 MG Tab  Commonly known as: NAMENDA  Take 10 mg by mouth 2 (two) times daily.            STOP taking these medications      CONSTULOSE 10 gram/15 mL solution  Generic drug: lactulose  Replaced by: lactulose 20 gram/30 mL Soln              Indwelling Lines/Drains at time of discharge:   Lines/Drains/Airways       None                   Time spent on the discharge of patient: 40 minutes         Narcisa Alba NP  Department of Hospital Medicine  [facility] ([facility])

## 2024-10-17 NOTE — PROGRESS NOTES
* No surgery found *       SUBJECTIVE:   Patient reports:  Pain managed.  Feeling OK.    Friend/neighbor at bedside helping patient eat breakfast.    PT/Nursing notes reviewed.       Physical Exam:   Vital Signs   Wt Readings from Last 1 Encounters:   10/17/24 57.3 kg (126 lb 5.2 oz)     Temp Readings from Last 1 Encounters:   10/17/24 98.3 °F (36.8 °C) (Oral)     BP Readings from Last 1 Encounters:   10/17/24 (!) 159/80     Pulse Readings from Last 1 Encounters:   10/17/24 61       Body mass index is 18.13 kg/m².    General Appearance:   NAD, frail appearing, cooperative    Neurologic:  Alert and oriented x3    Pysch:  Age appropriate    STATION:   Sánchez's in bed eating breakfast.     Musculoskeletal:     Musculoskeletal:   Right HIP:    Skin intact.  Negative ecchymoses.  Positive swelling.  Thigh/calf soft.  Positive tenderness over greater trochanter, none over anterior hip. AROM flexes hip to 45 degrees easily while seated.  No pain with log rolling.  Sensation Intact.  EHL strength  5/5.  Ankle plantar/dorsiflexion strength  5/5.  Cap refill <2s.                LABS:   Hemoglobin   Date/Time Value Ref Range Status   10/17/2024 04:43 AM 10.3 (L) 14.0 - 18.0 g/dL Final     Hematocrit   Date/Time Value Ref Range Status   10/17/2024 04:43 AM 30.7 (L) 40.0 - 54.0 % Final       Sodium   Date/Time Value Ref Range Status   10/17/2024 04:43  136 - 145 mmol/L Final     Potassium   Date/Time Value Ref Range Status   10/17/2024 04:43 AM 3.8 3.5 - 5.1 mmol/L Final     Glucose   Date/Time Value Ref Range Status   10/17/2024 04:43 AM 80 70 - 110 mg/dL Final          Assessment:      Right hip greater trochanteric fracture, closed, minimally displaced  Parkinson's disease  Falls              DISCUSSION:   Patient's symptoms, imaging, diagnosis and prognosis reviewed and discussed.  Discussed healing progression and that it will heal on its own, it will just take time. Case with attending hospitalist.    Patient given  an opportunity to ask questions.  When his questions, were answered, the following plan was adopted.      Plan:        Weight bearing:    Right hip As Tolerated   Ice right hip     Recommend continued work with PT  Ortho Trauma to follow as inpatient  Will arrange follow-up appointment in 3 weeks with XR at Ortho trauma clinic        Elidia Powers DO, SHELLI, [facility]  Orthopaedic Surgeon

## 2024-10-17 NOTE — PT/OT/SLP PROGRESS
"Physical Therapy  Treatment    Jose Long   MRN: [id number]   Admitting Diagnosis: Closed displaced intertrochanteric fracture of right femur    PT Received On: 10/17/24  PT Start Time: 0815     PT Stop Time: 0840    PT Total Time (min): 25 min       Billable Minutes:  Gait Training 15 and Therapeutic Activity 10    Treatment Type: Treatment  PT/PTA: PT     Number of PTA visits since last PT visit: 0       General Precautions: Standard, fall  Orthopedic Precautions: RLE weight bearing as tolerated  Braces: N/A  Respiratory Status: Room air         Subjective:  Communicated with NURSE AND EPIC CHART REVIEW  prior to session.   PT AGREED TO TX     Pain/Comfort  Pain Rating 1: 4/10  Location - Side 1: Right  Location 1: hip  Pain Rating Post-Intervention 1: 4/10  Pain Rating Post-Intervention 2: 4/10    Objective:   Patient found with: peripheral IV, telemetry    Functional Mobility:  PT MET IN RM SUP IN BED. PT SUP>SIT EOB WITH MIN A AND INC TIME AND INSTRUCTION TO COMPLETE TASK D/T PAIN. PT SCOOTED IN BED WITH MIN A. GT. BELT AND  SOCKS DONNED PRIOR TO OOB MOBILITY. PT STOOD WITH RW AND MOD A X 2 TRIALS WITH POST LEAN. PT REQUESTING TO USE THE BATHROOM. PT GT TRAINED X 10' TO COMMODE WITH RW AND MOD A WITH STEP TO GT. PT T/F ON AND OFF COMMODE WITH MOD A AND NEEDED MAX A FOR GROOMING AND DONNING CLEAN DIAPER. PT RETURNED TO RM T/F TO EOB WITH MOD A. PT LEFT SEATED EOB PROPPED WITH PILLOW AND SET UP WITH BREAKFAST.     Treatment and Education:  PT EDUCATED ON "CALL DON'T FALL", ENCOURAGED TO CALL FOR ASSISTANCE WITH ALL NEEDS FOR OOB MOBILITY.       AM-PAC 6 CLICK MOBILITY  How much help from another person does this patient currently need?   1 = Unable, Total/Dependent Assistance  2 = A lot, Maximum/Moderate Assistance  3 = A little, Minimum/Contact Guard/Supervision  4 = None, Modified [place]/Independent    Turning over in bed (including adjusting bedclothes, sheets and blankets)?: 3  Sitting down on and " standing up from a chair with arms (e.g., wheelchair, bedside commode, etc.): 2  Moving from lying on back to sitting on the side of the bed?: 3  Moving to and from a bed to a chair (including a wheelchair)?: 2  Need to walk in hospital room?: 2  Climbing 3-5 steps with a railing?: 1  Basic Mobility Total Score: 13    AM-PAC Raw Score CMS G-Code Modifier Level of Impairment Assistance   6 % Total / Unable   7 - 9 CM 80 - 100% Maximal Assist   10 - 14 CL 60 - 80% Moderate Assist   15 - 19 CK 40 - 60% Moderate Assist   20 - 22 CJ 20 - 40% Minimal Assist   23 CI 1-20% SBA / CGA   24 CH 0% Independent/ Mod I     Patient left sitting edge of bed with call button in reach, bed alarm on, and FRIEND present.    Assessment:  PT SIVAN TX WITH INC PAIN TODAY.     Rehab identified problem list/impairments: weakness, impaired endurance, impaired self care skills, impaired functional mobility, gait instability, decreased safety awareness, pain, impaired balance, decreased lower extremity function, decreased coordination, impaired cognition, decreased ROM, orthopedic precautions    Rehab potential is good.    Activity tolerance: Fair    Discharge recommendations: High Intensity Therapy      Barriers to discharge:      Equipment recommendations: to be determined by next level of care     GOALS:   Multidisciplinary Problems       Physical Therapy Goals          Problem: Physical Therapy    Goal Priority Disciplines Outcome Interventions   Physical Therapy Goal     PT, PT/OT Progressing    Description: LTG: 10/29/24  1. PT WILL COMPLETE BED MOBILITY WITH ABNER   2. PT WILL STAND T/F TO CHAIR WITH RW AND MIN A  3. PT WILL GT TRAIN X 50' WITH RW AND CGA TO PROGRESS GT.  4. PT WILL INC AMPAC SCORE BY 2 POINTS TO PROGRESS GROSS FUNC MOBILITY.                          PLAN:    Patient to be seen 3 x/week to address the above listed problems via therapeutic activities, therapeutic exercises, gait training  Plan of Care expires:  10/29/24  Plan of Care reviewed with: patient         10/17/2024

## 2024-10-30 ENCOUNTER — PATIENT MESSAGE (OUTPATIENT)
Dept: FAMILY MEDICINE | Facility: CLINIC | Age: 69
End: 2024-10-30
Payer: COMMERCIAL

## 2024-10-30 DIAGNOSIS — F41.9 ANXIETY: Primary | ICD-10-CM

## 2024-10-30 RX ORDER — ALPRAZOLAM 0.25 MG/1
0.25 TABLET ORAL DAILY PRN
Qty: 30 TABLET | Refills: 3 | Status: SHIPPED | OUTPATIENT
Start: 2024-10-30 | End: 2025-02-27

## 2024-11-07 ENCOUNTER — OFFICE VISIT (OUTPATIENT)
Dept: ORTHOPEDICS | Facility: CLINIC | Age: 69
End: 2024-11-07
Payer: COMMERCIAL

## 2024-11-07 ENCOUNTER — HOSPITAL ENCOUNTER (OUTPATIENT)
Dept: RADIOLOGY | Facility: HOSPITAL | Age: 69
Discharge: HOME | End: 2024-11-07
Attending: ORTHOPAEDIC SURGERY
Payer: COMMERCIAL

## 2024-11-07 VITALS
SYSTOLIC BLOOD PRESSURE: 141 MMHG | WEIGHT: 126.31 LBS | BODY MASS INDEX: 18.08 KG/M2 | HEIGHT: 70 IN | DIASTOLIC BLOOD PRESSURE: 66 MMHG

## 2024-11-07 DIAGNOSIS — M25.551 PAIN OF RIGHT HIP: ICD-10-CM

## 2024-11-07 DIAGNOSIS — M25.551 PAIN OF RIGHT HIP: Primary | ICD-10-CM

## 2024-11-07 DIAGNOSIS — S72.114D CLOSED NONDISPLACED FRACTURE OF GREATER TROCHANTER OF RIGHT FEMUR WITH ROUTINE HEALING, SUBSEQUENT ENCOUNTER: Primary | ICD-10-CM

## 2024-11-07 PROCEDURE — 3288F FALL RISK ASSESSMENT DOCD: CPT | Mod: CPTII,S$GLB,, | Performed by: PHYSICIAN ASSISTANT

## 2024-11-07 PROCEDURE — 1159F MED LIST DOCD IN RCRD: CPT | Mod: CPTII,S$GLB,, | Performed by: PHYSICIAN ASSISTANT

## 2024-11-07 PROCEDURE — 3077F SYST BP >= 140 MM HG: CPT | Mod: CPTII,S$GLB,, | Performed by: PHYSICIAN ASSISTANT

## 2024-11-07 PROCEDURE — 73502 X-RAY EXAM HIP UNI 2-3 VIEWS: CPT | Mod: TC,RT

## 2024-11-07 PROCEDURE — 3078F DIAST BP <80 MM HG: CPT | Mod: CPTII,S$GLB,, | Performed by: PHYSICIAN ASSISTANT

## 2024-11-07 PROCEDURE — 1125F AMNT PAIN NOTED PAIN PRSNT: CPT | Mod: CPTII,S$GLB,, | Performed by: PHYSICIAN ASSISTANT

## 2024-11-07 PROCEDURE — 1160F RVW MEDS BY RX/DR IN RCRD: CPT | Mod: CPTII,S$GLB,, | Performed by: PHYSICIAN ASSISTANT

## 2024-11-07 PROCEDURE — 3066F NEPHROPATHY DOC TX: CPT | Mod: CPTII,S$GLB,, | Performed by: PHYSICIAN ASSISTANT

## 2024-11-07 PROCEDURE — 4010F ACE/ARB THERAPY RXD/TAKEN: CPT | Mod: CPTII,S$GLB,, | Performed by: PHYSICIAN ASSISTANT

## 2024-11-07 PROCEDURE — 73502 X-RAY EXAM HIP UNI 2-3 VIEWS: CPT | Mod: 26,RT,, | Performed by: RADIOLOGY

## 2024-11-07 PROCEDURE — 1100F PTFALLS ASSESS-DOCD GE2>/YR: CPT | Mod: CPTII,S$GLB,, | Performed by: PHYSICIAN ASSISTANT

## 2024-11-07 PROCEDURE — 3008F BODY MASS INDEX DOCD: CPT | Mod: CPTII,S$GLB,, | Performed by: PHYSICIAN ASSISTANT

## 2024-11-07 PROCEDURE — 1111F DSCHRG MED/CURRENT MED MERGE: CPT | Mod: CPTII,S$GLB,, | Performed by: PHYSICIAN ASSISTANT

## 2024-11-07 PROCEDURE — 99214 OFFICE O/P EST MOD 30 MIN: CPT | Mod: S$GLB,,, | Performed by: PHYSICIAN ASSISTANT

## 2024-11-07 PROCEDURE — 99999 PR PBB SHADOW E&M-EST. PATIENT-LVL IV: CPT | Mod: PBBFAC,,, | Performed by: PHYSICIAN ASSISTANT

## 2024-11-07 RX ORDER — QUETIAPINE FUMARATE 25 MG/1
25 TABLET, FILM COATED ORAL NIGHTLY
COMMUNITY
Start: 2024-11-05

## 2024-11-07 NOTE — PROGRESS NOTES
"Subjective:      Patient ID: Jose Long is a 68 y.o. male.    History of Present Illness    CHIEF COMPLAINT:  - Jose presents for follow-up evaluation of a right hip fracture sustained from multiple falls approximately one month ago.    HPI:  Jose presents to the clinic for evaluation of a hip injury following multiple falls. His daughter reports three falls in about three weeks, with the most recent occurring around October 10th. The falls were primarily outside, though one occurred inside. Initially, they thought it was just bruising, but after a few days, they decided to get an x-ray.    He was admitted to the hospital and seen by Dr. Powers on October 15th. A CT revealed a greater trochanteric femur fracture. The fracture is not displaced and appears to be starting to heal, though it's only been about a month since the injury.    Currently, the patient reports pain primarily when moving. His daughter states, "He's sitting in a lounger and I don't think it's real bad. But God, if he just stands up to go pee, screaming bloody murder." He denies significant pain at rest or when the area is touched. He has difficulty raising his leg independently and experiences pain with certain movements.    Jose has been receiving home health therapy and uses a wheelchair for longer distances. His daughter reports, "Even like when he needs to pee, I just get him up out of the recliner and we just use the little bucket. We don't even walk to the bathroom anymore." He has had three falls since returning home from rehab 1.5 weeks ago, despite attempts to keep him seated.    Jose's Parkinson's disease complicates his mobility and increases his fall risk. His daughter expresses concern about his desire to do outdoor activities, stating, "He gets stir crazy and I don't blame him." She allows him to walk short distances, such as from the chair to the dining table, which is about 20 to 30 steps.       Past Medical History: " "  Diagnosis Date    Alzheimer's dementia     Dyslipidemia     Hypertension     Multiple allergies      Current Outpatient Medications:     acetaminophen (TYLENOL ARTHRITIS PAIN) 650 MG TbSR, Take 1 tablet (650 mg total) by mouth every 8 (eight) hours as needed., Disp: , Rfl:     amlodipine-benazepril 10-20mg (LOTREL) 10-20 mg per capsule, Take 1 capsule by mouth once daily., Disp: 90 capsule, Rfl: 3    carbidopa-levodopa  mg (SINEMET)  mg per tablet, Take 1 tablet by mouth 3 (three) times daily., Disp: , Rfl:     cetirizine (ZYRTEC) 10 MG tablet, Take 1 tablet by mouth Daily. 1 Tablet Oral Every day.  To get over-the-counter, Disp: , Rfl:     enoxaparin (LOVENOX) 40 mg/0.4 mL Syrg, Inject 0.4 mLs (40 mg total) into the skin Q24H (prophylaxis, 1700)., Disp: , Rfl:     lactulose (CHRONULAC) 20 gram/30 mL Soln, Take 15 mLs (10 g total) by mouth once daily., Disp: , Rfl:     memantine (NAMENDA) 10 MG Tab, Take 10 mg by mouth 2 (two) times daily., Disp: , Rfl:     QUEtiapine (SEROQUEL) 25 MG Tab, Take 25 mg by mouth every evening., Disp: , Rfl:     ALPRAZolam (XANAX) 0.25 MG tablet, Take 1 tablet (0.25 mg total) by mouth daily as needed for Anxiety. (Patient not taking: Reported on 11/7/2024), Disp: 30 tablet, Rfl: 3    Review of patient's allergies indicates:  No Known Allergies    BP (!) 141/66 (BP Location: Left arm, Patient Position: Sitting)   Ht 5' 10" (1.778 m)   Wt 57.3 kg (126 lb 5.2 oz)   BMI 18.13 kg/m²       Objective:    Ortho Exam   Right hip:   Skin is intact  Minimal edema   Mild TTP over greater trochanter   Pain increases significantly with internal/external rotation   Calf and compartments are soft and compressible   Motor exam normal   Sensation and pulses intact  Cap refill brisk    GEN: Well developed, well nourished male. AAOX3. No acute distress.   Head: Normocephalic, atraumatic.   Eyes: LEONARDO  Neck: Trachea is midline, no adenopathy  Resp: Breathing unlabored.  Neuro: Motor " function normal, Cranial nerves intact  Psych: Mood and affect appropriate.    Assessment:     Imaging:  X-ray right hip with AP pelvis obtained today shows previously noted right greater trochanteric femur fracture in similar alignment without significant healing.  No detrimental changes.      1. Closed nondisplaced fracture of greater trochanter of right femur with routine healing, subsequent encounter        Plan:     - Avoid walking longer distances without assistance.  - Use wheelchair for shopping or longer trips outside the home.  - Limit walking to short distances (20-30 steps) under supervision, such as from chair to dining table.  - Can put full weight on the affected leg, but should be careful to avoid falls.  - Avoid outdoor activities and yard work due to fall risk.  - Stay in recliner or chair to minimize fall risk.  - Use portable urinal instead of walking to bathroom when possible.  - Follow up in about 6 weeks with another x-ray to ensure the fracture has fully healed.  - Return sooner if condition worsens or if there is another fall with concern of a new fracture.      Follow up in about 6 weeks (around 12/19/2024).      Patient note was created using MModal Dictation.  Any errors in syntax or even information may not have been identified and edited on initial review prior to signing this note.    This note was generated with the assistance of ambient listening technology. Verbal consent was obtained by the patient and accompanying visitor(s) for the recording of patient appointment to facilitate this note. I attest to having reviewed and edited the generated note for accuracy, though some syntax or spelling errors may persist. Please contact the author of this note for any clarification.

## 2024-12-08 ENCOUNTER — HOSPITAL ENCOUNTER (INPATIENT)
Facility: HOSPITAL | Age: 69
LOS: 1 days | Discharge: HOSPICE | DRG: 682 | End: 2024-12-09
Attending: EMERGENCY MEDICINE | Admitting: INTERNAL MEDICINE
Payer: COMMERCIAL

## 2024-12-08 DIAGNOSIS — R07.9 CHEST PAIN: ICD-10-CM

## 2024-12-08 DIAGNOSIS — R53.1 WEAKNESS GENERALIZED: ICD-10-CM

## 2024-12-08 DIAGNOSIS — R31.9 URINARY TRACT INFECTION WITH HEMATURIA, SITE UNSPECIFIED: ICD-10-CM

## 2024-12-08 DIAGNOSIS — N17.9 AKI (ACUTE KIDNEY INJURY): Primary | ICD-10-CM

## 2024-12-08 DIAGNOSIS — R53.1 WEAKNESS: ICD-10-CM

## 2024-12-08 DIAGNOSIS — N39.0 URINARY TRACT INFECTION WITH HEMATURIA, SITE UNSPECIFIED: ICD-10-CM

## 2024-12-08 DIAGNOSIS — E87.6 HYPOKALEMIA: ICD-10-CM

## 2024-12-08 PROBLEM — E87.0 HYPERNATREMIA: Status: ACTIVE | Noted: 2024-12-08

## 2024-12-08 PROBLEM — G93.41 ACUTE METABOLIC ENCEPHALOPATHY: Status: ACTIVE | Noted: 2024-12-08

## 2024-12-08 PROBLEM — N18.31 ACUTE RENAL FAILURE SUPERIMPOSED ON STAGE 3A CHRONIC KIDNEY DISEASE: Status: ACTIVE | Noted: 2024-12-08

## 2024-12-08 PROBLEM — N30.00 ACUTE CYSTITIS WITHOUT HEMATURIA: Status: ACTIVE | Noted: 2024-12-08

## 2024-12-08 PROBLEM — R74.8 ELEVATED CK: Status: ACTIVE | Noted: 2024-12-08

## 2024-12-08 LAB
ALBUMIN SERPL BCP-MCNC: 3.6 G/DL (ref 3.5–5.2)
ALP SERPL-CCNC: 131 U/L (ref 40–150)
ALT SERPL W/O P-5'-P-CCNC: 29 U/L (ref 10–44)
ANION GAP SERPL CALC-SCNC: 13 MMOL/L (ref 8–16)
AST SERPL-CCNC: 44 U/L (ref 10–40)
BACTERIA #/AREA URNS HPF: ABNORMAL /HPF
BASOPHILS # BLD AUTO: 0.04 K/UL (ref 0–0.2)
BASOPHILS NFR BLD: 0.3 % (ref 0–1.9)
BILIRUB SERPL-MCNC: 0.7 MG/DL (ref 0.1–1)
BILIRUB UR QL STRIP: NEGATIVE
BNP SERPL-MCNC: 30 PG/ML (ref 0–99)
BUN SERPL-MCNC: 51 MG/DL (ref 8–23)
CALCIUM SERPL-MCNC: 9.4 MG/DL (ref 8.7–10.5)
CHLORIDE SERPL-SCNC: 107 MMOL/L (ref 95–110)
CK SERPL-CCNC: 629 U/L (ref 20–200)
CLARITY UR: CLEAR
CO2 SERPL-SCNC: 29 MMOL/L (ref 23–29)
COLOR UR: YELLOW
CREAT SERPL-MCNC: 1.6 MG/DL (ref 0.5–1.4)
DIFFERENTIAL METHOD BLD: ABNORMAL
EOSINOPHIL # BLD AUTO: 0.1 K/UL (ref 0–0.5)
EOSINOPHIL NFR BLD: 1 % (ref 0–8)
ERYTHROCYTE [DISTWIDTH] IN BLOOD BY AUTOMATED COUNT: 11.6 % (ref 11.5–14.5)
EST. GFR  (NO RACE VARIABLE): 46 ML/MIN/1.73 M^2
GLUCOSE SERPL-MCNC: 86 MG/DL (ref 70–110)
GLUCOSE UR QL STRIP: NEGATIVE
HCT VFR BLD AUTO: 37.2 % (ref 40–54)
HGB BLD-MCNC: 12.6 G/DL (ref 14–18)
HGB UR QL STRIP: ABNORMAL
HYALINE CASTS #/AREA URNS LPF: 1 /LPF
IMM GRANULOCYTES # BLD AUTO: 0.06 K/UL (ref 0–0.04)
IMM GRANULOCYTES NFR BLD AUTO: 0.5 % (ref 0–0.5)
INFLUENZA A, MOLECULAR: NEGATIVE
INFLUENZA B, MOLECULAR: NEGATIVE
KETONES UR QL STRIP: ABNORMAL
LEUKOCYTE ESTERASE UR QL STRIP: ABNORMAL
LYMPHOCYTES # BLD AUTO: 1.1 K/UL (ref 1–4.8)
LYMPHOCYTES NFR BLD: 8.6 % (ref 18–48)
MAGNESIUM SERPL-MCNC: 2.3 MG/DL (ref 1.6–2.6)
MCH RBC QN AUTO: 30.7 PG (ref 27–31)
MCHC RBC AUTO-ENTMCNC: 33.9 G/DL (ref 32–36)
MCV RBC AUTO: 91 FL (ref 82–98)
MICROSCOPIC COMMENT: ABNORMAL
MONOCYTES # BLD AUTO: 0.8 K/UL (ref 0.3–1)
MONOCYTES NFR BLD: 6.5 % (ref 4–15)
NEUTROPHILS # BLD AUTO: 10.4 K/UL (ref 1.8–7.7)
NEUTROPHILS NFR BLD: 83.1 % (ref 38–73)
NITRITE UR QL STRIP: NEGATIVE
NRBC BLD-RTO: 0 /100 WBC
PH UR STRIP: 6 [PH] (ref 5–8)
PLATELET # BLD AUTO: 277 K/UL (ref 150–450)
PMV BLD AUTO: 9.7 FL (ref 9.2–12.9)
POCT GLUCOSE: 92 MG/DL (ref 70–110)
POTASSIUM SERPL-SCNC: 3.2 MMOL/L (ref 3.5–5.1)
PROT SERPL-MCNC: 7.1 G/DL (ref 6–8.4)
PROT UR QL STRIP: ABNORMAL
RBC # BLD AUTO: 4.11 M/UL (ref 4.6–6.2)
RBC #/AREA URNS HPF: 30 /HPF (ref 0–4)
SARS-COV-2 RDRP RESP QL NAA+PROBE: NEGATIVE
SODIUM SERPL-SCNC: 149 MMOL/L (ref 136–145)
SP GR UR STRIP: 1.02 (ref 1–1.03)
SPECIMEN SOURCE: NORMAL
TROPONIN I SERPL DL<=0.01 NG/ML-MCNC: 0.02 NG/ML (ref 0–0.03)
TSH SERPL DL<=0.005 MIU/L-ACNC: 1.4 UIU/ML (ref 0.4–4)
URN SPEC COLLECT METH UR: ABNORMAL
UROBILINOGEN UR STRIP-ACNC: ABNORMAL EU/DL
WBC # BLD AUTO: 12.53 K/UL (ref 3.9–12.7)
WBC #/AREA URNS HPF: 24 /HPF (ref 0–5)
WBC CLUMPS URNS QL MICRO: ABNORMAL

## 2024-12-08 PROCEDURE — 85610 PROTHROMBIN TIME: CPT | Performed by: INTERNAL MEDICINE

## 2024-12-08 PROCEDURE — 80053 COMPREHEN METABOLIC PANEL: CPT | Performed by: EMERGENCY MEDICINE

## 2024-12-08 PROCEDURE — 63600175 PHARM REV CODE 636 W HCPCS: Performed by: INTERNAL MEDICINE

## 2024-12-08 PROCEDURE — 82728 ASSAY OF FERRITIN: CPT | Performed by: INTERNAL MEDICINE

## 2024-12-08 PROCEDURE — 25000003 PHARM REV CODE 250: Performed by: EMERGENCY MEDICINE

## 2024-12-08 PROCEDURE — 84134 ASSAY OF PREALBUMIN: CPT | Performed by: INTERNAL MEDICINE

## 2024-12-08 PROCEDURE — 84443 ASSAY THYROID STIM HORMONE: CPT | Performed by: EMERGENCY MEDICINE

## 2024-12-08 PROCEDURE — 11000001 HC ACUTE MED/SURG PRIVATE ROOM

## 2024-12-08 PROCEDURE — 84484 ASSAY OF TROPONIN QUANT: CPT | Performed by: EMERGENCY MEDICINE

## 2024-12-08 PROCEDURE — 82550 ASSAY OF CK (CPK): CPT | Performed by: EMERGENCY MEDICINE

## 2024-12-08 PROCEDURE — 82746 ASSAY OF FOLIC ACID SERUM: CPT | Performed by: INTERNAL MEDICINE

## 2024-12-08 PROCEDURE — 83880 ASSAY OF NATRIURETIC PEPTIDE: CPT | Performed by: EMERGENCY MEDICINE

## 2024-12-08 PROCEDURE — 87635 SARS-COV-2 COVID-19 AMP PRB: CPT | Performed by: EMERGENCY MEDICINE

## 2024-12-08 PROCEDURE — 82962 GLUCOSE BLOOD TEST: CPT

## 2024-12-08 PROCEDURE — 87086 URINE CULTURE/COLONY COUNT: CPT | Performed by: EMERGENCY MEDICINE

## 2024-12-08 PROCEDURE — 99285 EMERGENCY DEPT VISIT HI MDM: CPT | Mod: 25

## 2024-12-08 PROCEDURE — 81000 URINALYSIS NONAUTO W/SCOPE: CPT | Performed by: EMERGENCY MEDICINE

## 2024-12-08 PROCEDURE — 83735 ASSAY OF MAGNESIUM: CPT | Performed by: EMERGENCY MEDICINE

## 2024-12-08 PROCEDURE — 93010 ELECTROCARDIOGRAM REPORT: CPT | Mod: ,,, | Performed by: INTERNAL MEDICINE

## 2024-12-08 PROCEDURE — 93005 ELECTROCARDIOGRAM TRACING: CPT

## 2024-12-08 PROCEDURE — 82607 VITAMIN B-12: CPT | Performed by: INTERNAL MEDICINE

## 2024-12-08 PROCEDURE — 87502 INFLUENZA DNA AMP PROBE: CPT | Performed by: EMERGENCY MEDICINE

## 2024-12-08 PROCEDURE — 83540 ASSAY OF IRON: CPT | Performed by: INTERNAL MEDICINE

## 2024-12-08 PROCEDURE — 63600175 PHARM REV CODE 636 W HCPCS: Performed by: EMERGENCY MEDICINE

## 2024-12-08 PROCEDURE — 85025 COMPLETE CBC W/AUTO DIFF WBC: CPT | Performed by: EMERGENCY MEDICINE

## 2024-12-08 PROCEDURE — 96374 THER/PROPH/DIAG INJ IV PUSH: CPT

## 2024-12-08 RX ORDER — ACETAMINOPHEN 650 MG/1
650 SUPPOSITORY RECTAL EVERY 6 HOURS PRN
Status: DISCONTINUED | OUTPATIENT
Start: 2024-12-08 | End: 2024-12-09 | Stop reason: HOSPADM

## 2024-12-08 RX ORDER — IBUPROFEN 200 MG
16 TABLET ORAL
Status: DISCONTINUED | OUTPATIENT
Start: 2024-12-08 | End: 2024-12-09 | Stop reason: HOSPADM

## 2024-12-08 RX ORDER — SODIUM CHLORIDE 0.9 % (FLUSH) 0.9 %
10 SYRINGE (ML) INJECTION EVERY 12 HOURS PRN
Status: DISCONTINUED | OUTPATIENT
Start: 2024-12-08 | End: 2024-12-09 | Stop reason: HOSPADM

## 2024-12-08 RX ORDER — CEFTRIAXONE 2 G/1
2 INJECTION, POWDER, FOR SOLUTION INTRAMUSCULAR; INTRAVENOUS
Status: DISCONTINUED | OUTPATIENT
Start: 2024-12-09 | End: 2024-12-09 | Stop reason: HOSPADM

## 2024-12-08 RX ORDER — CEFTRIAXONE 1 G/1
1 INJECTION, POWDER, FOR SOLUTION INTRAMUSCULAR; INTRAVENOUS
Status: COMPLETED | OUTPATIENT
Start: 2024-12-08 | End: 2024-12-08

## 2024-12-08 RX ORDER — HYDRALAZINE HYDROCHLORIDE 20 MG/ML
10 INJECTION INTRAMUSCULAR; INTRAVENOUS EVERY 6 HOURS PRN
Status: DISCONTINUED | OUTPATIENT
Start: 2024-12-08 | End: 2024-12-09 | Stop reason: HOSPADM

## 2024-12-08 RX ORDER — NALOXONE HCL 0.4 MG/ML
0.02 VIAL (ML) INJECTION
Status: DISCONTINUED | OUTPATIENT
Start: 2024-12-08 | End: 2024-12-09 | Stop reason: HOSPADM

## 2024-12-08 RX ORDER — IBUPROFEN 200 MG
24 TABLET ORAL
Status: DISCONTINUED | OUTPATIENT
Start: 2024-12-08 | End: 2024-12-09 | Stop reason: HOSPADM

## 2024-12-08 RX ORDER — POTASSIUM CHLORIDE 7.45 MG/ML
10 INJECTION INTRAVENOUS
Status: COMPLETED | OUTPATIENT
Start: 2024-12-08 | End: 2024-12-09

## 2024-12-08 RX ORDER — DEXTROSE MONOHYDRATE AND SODIUM CHLORIDE 5; .45 G/100ML; G/100ML
INJECTION, SOLUTION INTRAVENOUS CONTINUOUS
Status: DISCONTINUED | OUTPATIENT
Start: 2024-12-08 | End: 2024-12-09 | Stop reason: HOSPADM

## 2024-12-08 RX ORDER — ONDANSETRON HYDROCHLORIDE 2 MG/ML
4 INJECTION, SOLUTION INTRAVENOUS EVERY 6 HOURS PRN
Status: DISCONTINUED | OUTPATIENT
Start: 2024-12-08 | End: 2024-12-09 | Stop reason: HOSPADM

## 2024-12-08 RX ORDER — GLUCAGON 1 MG
1 KIT INJECTION
Status: DISCONTINUED | OUTPATIENT
Start: 2024-12-08 | End: 2024-12-09 | Stop reason: HOSPADM

## 2024-12-08 RX ORDER — ENOXAPARIN SODIUM 100 MG/ML
30 INJECTION SUBCUTANEOUS EVERY 24 HOURS
Status: DISCONTINUED | OUTPATIENT
Start: 2024-12-08 | End: 2024-12-09

## 2024-12-08 RX ADMIN — SODIUM CHLORIDE 1000 ML: 9 INJECTION, SOLUTION INTRAVENOUS at 10:12

## 2024-12-08 RX ADMIN — CEFTRIAXONE 1 G: 1 INJECTION, POWDER, FOR SOLUTION INTRAMUSCULAR; INTRAVENOUS at 10:12

## 2024-12-08 RX ADMIN — POTASSIUM CHLORIDE 10 MEQ: 7.46 INJECTION, SOLUTION INTRAVENOUS at 11:12

## 2024-12-09 VITALS
HEART RATE: 65 BPM | WEIGHT: 125.25 LBS | TEMPERATURE: 99 F | HEIGHT: 70 IN | DIASTOLIC BLOOD PRESSURE: 75 MMHG | RESPIRATION RATE: 17 BRPM | BODY MASS INDEX: 17.93 KG/M2 | OXYGEN SATURATION: 97 % | SYSTOLIC BLOOD PRESSURE: 159 MMHG

## 2024-12-09 PROBLEM — Z51.5 ENCOUNTER FOR PALLIATIVE CARE: Status: ACTIVE | Noted: 2024-12-09

## 2024-12-09 LAB
ALBUMIN SERPL BCP-MCNC: 3.1 G/DL (ref 3.5–5.2)
ALP SERPL-CCNC: 110 U/L (ref 40–150)
ALT SERPL W/O P-5'-P-CCNC: 23 U/L (ref 10–44)
ANION GAP SERPL CALC-SCNC: 12 MMOL/L (ref 8–16)
AST SERPL-CCNC: 41 U/L (ref 10–40)
BASOPHILS # BLD AUTO: 0.03 K/UL (ref 0–0.2)
BASOPHILS NFR BLD: 0.3 % (ref 0–1.9)
BILIRUB SERPL-MCNC: 0.5 MG/DL (ref 0.1–1)
BUN SERPL-MCNC: 48 MG/DL (ref 8–23)
CALCIUM SERPL-MCNC: 8.5 MG/DL (ref 8.7–10.5)
CHLORIDE SERPL-SCNC: 111 MMOL/L (ref 95–110)
CK SERPL-CCNC: 678 U/L (ref 20–200)
CO2 SERPL-SCNC: 26 MMOL/L (ref 23–29)
CREAT SERPL-MCNC: 1.4 MG/DL (ref 0.5–1.4)
DIFFERENTIAL METHOD BLD: ABNORMAL
EOSINOPHIL # BLD AUTO: 0.1 K/UL (ref 0–0.5)
EOSINOPHIL NFR BLD: 0.5 % (ref 0–8)
ERYTHROCYTE [DISTWIDTH] IN BLOOD BY AUTOMATED COUNT: 11.7 % (ref 11.5–14.5)
EST. GFR  (NO RACE VARIABLE): 54 ML/MIN/1.73 M^2
FERRITIN SERPL-MCNC: 276 NG/ML (ref 20–300)
FOLATE SERPL-MCNC: 15.2 NG/ML (ref 4–24)
GLUCOSE SERPL-MCNC: 83 MG/DL (ref 70–110)
HCT VFR BLD AUTO: 33.1 % (ref 40–54)
HGB BLD-MCNC: 11 G/DL (ref 14–18)
IMM GRANULOCYTES # BLD AUTO: 0.03 K/UL (ref 0–0.04)
IMM GRANULOCYTES NFR BLD AUTO: 0.3 % (ref 0–0.5)
INR PPP: 1.1 (ref 0.8–1.2)
IRON SERPL-MCNC: 16 UG/DL (ref 45–160)
LYMPHOCYTES # BLD AUTO: 1 K/UL (ref 1–4.8)
LYMPHOCYTES NFR BLD: 10 % (ref 18–48)
MAGNESIUM SERPL-MCNC: 2.1 MG/DL (ref 1.6–2.6)
MCH RBC QN AUTO: 30.3 PG (ref 27–31)
MCHC RBC AUTO-ENTMCNC: 33.2 G/DL (ref 32–36)
MCV RBC AUTO: 91 FL (ref 82–98)
MONOCYTES # BLD AUTO: 0.7 K/UL (ref 0.3–1)
MONOCYTES NFR BLD: 6.7 % (ref 4–15)
NEUTROPHILS # BLD AUTO: 8.4 K/UL (ref 1.8–7.7)
NEUTROPHILS NFR BLD: 82.2 % (ref 38–73)
NRBC BLD-RTO: 0 /100 WBC
OHS QRS DURATION: 82 MS
OHS QTC CALCULATION: 515 MS
PHOSPHATE SERPL-MCNC: 3.6 MG/DL (ref 2.7–4.5)
PLATELET # BLD AUTO: 259 K/UL (ref 150–450)
PMV BLD AUTO: 10.1 FL (ref 9.2–12.9)
POTASSIUM SERPL-SCNC: 3.5 MMOL/L (ref 3.5–5.1)
PREALB SERPL-MCNC: 9 MG/DL (ref 20–43)
PROT SERPL-MCNC: 6.1 G/DL (ref 6–8.4)
PROTHROMBIN TIME: 11.6 SEC (ref 9–12.5)
RBC # BLD AUTO: 3.63 M/UL (ref 4.6–6.2)
SATURATED IRON: 7 % (ref 20–50)
SODIUM SERPL-SCNC: 149 MMOL/L (ref 136–145)
TOTAL IRON BINDING CAPACITY: 218 UG/DL (ref 250–450)
TRANSFERRIN SERPL-MCNC: 147 MG/DL (ref 200–375)
VIT B12 SERPL-MCNC: 426 PG/ML (ref 210–950)
WBC # BLD AUTO: 10.23 K/UL (ref 3.9–12.7)

## 2024-12-09 PROCEDURE — 36415 COLL VENOUS BLD VENIPUNCTURE: CPT | Performed by: INTERNAL MEDICINE

## 2024-12-09 PROCEDURE — 36415 COLL VENOUS BLD VENIPUNCTURE: CPT | Performed by: STUDENT IN AN ORGANIZED HEALTH CARE EDUCATION/TRAINING PROGRAM

## 2024-12-09 PROCEDURE — 99223 1ST HOSP IP/OBS HIGH 75: CPT | Mod: 25,,, | Performed by: NURSE PRACTITIONER

## 2024-12-09 PROCEDURE — 94761 N-INVAS EAR/PLS OXIMETRY MLT: CPT

## 2024-12-09 PROCEDURE — 25000003 PHARM REV CODE 250: Performed by: INTERNAL MEDICINE

## 2024-12-09 PROCEDURE — 87040 BLOOD CULTURE FOR BACTERIA: CPT | Mod: 59 | Performed by: STUDENT IN AN ORGANIZED HEALTH CARE EDUCATION/TRAINING PROGRAM

## 2024-12-09 PROCEDURE — S5010 5% DEXTROSE AND 0.45% SALINE: HCPCS | Performed by: INTERNAL MEDICINE

## 2024-12-09 PROCEDURE — 85025 COMPLETE CBC W/AUTO DIFF WBC: CPT | Performed by: INTERNAL MEDICINE

## 2024-12-09 PROCEDURE — 92610 EVALUATE SWALLOWING FUNCTION: CPT

## 2024-12-09 PROCEDURE — 82550 ASSAY OF CK (CPK): CPT | Performed by: INTERNAL MEDICINE

## 2024-12-09 PROCEDURE — 63600175 PHARM REV CODE 636 W HCPCS: Performed by: STUDENT IN AN ORGANIZED HEALTH CARE EDUCATION/TRAINING PROGRAM

## 2024-12-09 PROCEDURE — 99497 ADVNCD CARE PLAN 30 MIN: CPT | Mod: 25,,, | Performed by: NURSE PRACTITIONER

## 2024-12-09 PROCEDURE — 83735 ASSAY OF MAGNESIUM: CPT | Performed by: INTERNAL MEDICINE

## 2024-12-09 PROCEDURE — 63600175 PHARM REV CODE 636 W HCPCS: Performed by: INTERNAL MEDICINE

## 2024-12-09 PROCEDURE — 80053 COMPREHEN METABOLIC PANEL: CPT | Performed by: INTERNAL MEDICINE

## 2024-12-09 PROCEDURE — 84100 ASSAY OF PHOSPHORUS: CPT | Performed by: INTERNAL MEDICINE

## 2024-12-09 RX ORDER — CEFDINIR 300 MG/1
300 CAPSULE ORAL EVERY 12 HOURS
Qty: 6 CAPSULE | Refills: 0 | Status: SHIPPED | OUTPATIENT
Start: 2024-12-09 | End: 2024-12-12

## 2024-12-09 RX ORDER — CEFDINIR 300 MG/1
300 CAPSULE ORAL EVERY 12 HOURS
Qty: 6 CAPSULE | Refills: 0 | Status: SHIPPED | OUTPATIENT
Start: 2024-12-09 | End: 2024-12-09

## 2024-12-09 RX ORDER — MORPHINE SULFATE 2 MG/ML
0.5 INJECTION, SOLUTION INTRAMUSCULAR; INTRAVENOUS EVERY 6 HOURS PRN
Status: DISCONTINUED | OUTPATIENT
Start: 2024-12-09 | End: 2024-12-09 | Stop reason: HOSPADM

## 2024-12-09 RX ADMIN — POTASSIUM CHLORIDE 10 MEQ: 7.46 INJECTION, SOLUTION INTRAVENOUS at 02:12

## 2024-12-09 RX ADMIN — MORPHINE SULFATE 0.5 MG: 2 INJECTION, SOLUTION INTRAMUSCULAR; INTRAVENOUS at 01:12

## 2024-12-09 RX ADMIN — DEXTROSE AND SODIUM CHLORIDE: 5; 450 INJECTION, SOLUTION INTRAVENOUS at 01:12

## 2024-12-09 RX ADMIN — ENOXAPARIN SODIUM 30 MG: 30 INJECTION SUBCUTANEOUS at 01:12

## 2024-12-09 RX ADMIN — POTASSIUM CHLORIDE 10 MEQ: 7.46 INJECTION, SOLUTION INTRAVENOUS at 01:12

## 2024-12-09 RX ADMIN — HYDRALAZINE HYDROCHLORIDE 10 MG: 20 INJECTION INTRAMUSCULAR; INTRAVENOUS at 06:12

## 2024-12-09 NOTE — PT/OT/SLP EVAL
Speech Language Pathology Evaluation  Bedside Swallow    Patient Name:  Jose Long   MRN:  [id number]  Admitting Diagnosis: Acute metabolic encephalopathy    Recommendations:                 General Recommendations:  Dysphagia therapy  Diet recommendations:  Puree Diet - IDDSI Level 4, Thin liquids - IDDSI Level 0   Aspiration Precautions: 1 bite/sip at a time, Assistance with meals, Check for pocketing/oral residue, Feed only when awake/alert, Frequent oral care, HOB to 90 degrees, Monitor for s/s of aspiration, and Strict aspiration precautions   General Precautions: Standard, aspiration  Communication strategies:   significant communication deficits present     Assessment:     Jose Long is a 68 y.o. male who presented to the hospital with decreased appetite and altered mental status. He has a history significant for Alzheimer's, Parkinson's disease, dyslipidemia, colon polyps, anemia, chronic kidney disease stage IIIA, frequent falls, generalized weakness, right closed displaced right femur fracture October 2024 (no surgery, conservative management only), and moderate protein calorie malnutrition.     Patient with difficulty participating in today's assessment due to altered mental status, difficulty following directions, and difficulty communicating wants/needs. However, he did participate in PO trials of ice chips, sips of water, applesauce, and cracker. Oral phase was characterized by decreased labial/lingual strength, ROM and coordination resulting in prolonged mastication and minimal oral residue that required a liquid wash. Patient with no overt s/s of pharyngeal dysphagia during today's assessment.     Diet recommendations include thin liquids (IDDSI 0) and puree consistencies (IDDSI 4). Frequent oral care is recommended.     History:     Past Medical History:   Diagnosis Date    Alzheimer's dementia     Dyslipidemia     Hypertension     Multiple allergies        Past Surgical History:   Procedure  Laterality Date    COLONOSCOPY N/A 6/29/2018    Procedure: COLONOSCOPY;  Surgeon: Kermit Leone MD;  Location: [facility] ENDO;  Service: Endoscopy;  Laterality: N/A;    COLONOSCOPY N/A 5/9/2023    Procedure: COLONOSCOPY;  Surgeon: Jazmin Kraus MD;  Location: [facility] ENDO;  Service: Endoscopy;  Laterality: N/A;         Subjective     Patient seen at bedside. He was asleep but became alert with clinician prompting.   Patient goals: unable to state; Patient's caregiver requested SLP evaluation        Respiratory Status: Room air    Objective:     Oral Musculature Evaluation  Oral Musculature: general weakness  Dentition: scattered dentition  Secretion Management: adequate  Mucosal Quality: good  Mandibular Strength and Mobility: impaired  Oral Labial Strength and Mobility: impaired retraction, impaired coordination  Lingual Strength and Mobility: impaired strength  Velar Elevation: other (see comments) (Unable to assess due to difficulty with following commands)  Buccal Strength and Mobility: impaired  Volitional Cough: present  Volitional Swallow: present  Voice Prior to PO Intake: Weak vocal quality  Oral Musculature Comments: Generalized weakness; decreased coordination      Clinical Swallow Examination:   Of note, patient was fed by SLP throughout evaluation. Patient presented with:     CONSISTENCY  NOTES   THIN (IDDSI 0) 3 oz water challenge    No overt s/s of aspiration.    PUREE (IDDSI 4/Extremely Thick)   TSP bites of applesauce x2 No overt s/s of aspiration.    SOLID (IDDSI 7/Regular) Bite of cracker X1   Prolonged mastication; patient required cueing to continue chewing. Minimal oral residue after the swallow that was cleared with a liquid wash. No overt s/s of aspiration.      Thickened liquids were not used in this assessment. Harryfe (2018) reported that thickened liquids have no sound evidence at reducing the risk of pneumonia in patients with dysphagia and can cause harm by increasing their risk of  dehydration. It also presents an increased risk of UTI, electrolyte imbalance, constipation, fecal impaction, cognitive impairment, functional decline and even death ([place], 2002; [place], 2016).  Thickened liquids are associated with risks including dehydration, increased pharyngeal residue, potential interference with medication absorption, and decreased quality of life (Gilbert, 2013). Thickened liquids are also more likely to be silently aspirated than thin liquids (Eloy et al., 2018). This supports the assertion that we should confirm a patient requires thickened liquids with an instrumental swallow study prior to recommending them.    References:   Gilbert FRIEDMAN. (2013). Thickening agents used for dysphagia management: Effect on bioavailability of water, medication and feelings of satiety. Nutrition Journal, 12, 54. https://doi.org/10.1186/8274-3840-91-54    DANIEL Lyons, TADEO Cruz, JOSE Tidwell, & DANIEL Roque (2018). Cough response to aspiration in thin and thick fluids during FEES in hospitalized inpatients. International journal of language & communication disorders, 53(5), 909-918. https://doi.org/10.1111/1355-0887.89688    INTERPRETATION AND RISK ASSESSMENT:  Clinical swallow evaluation (CSE) revealed oral phase characterized by lingual, labial, and buccal strength and range of motion decreased for lip closure, bolus preparation and propulsion. The patient had anterior loss of the bolus with thin liquids. Minimal residue remained in the oral cavity following the swallow. Patient without overt clinical signs/symptoms of aspiration on any PO trials given. Contributing risk factors for dysphagia include cognitive deficits. Patient with increased risk for silent aspiration given potential sensory deficits related to Parkinson's Disease .    Clinical signs of oral dysphagia, likely Chronic related to known Parkinson's disease.  Patient with increased risk for silent aspiration given potential sensory deficits  related to Parkinson's Disease .          Goals:   Multidisciplinary Problems       SLP Goals          Problem: SLP    Goal Priority Disciplines Outcome   SLP Goal     SLP    Description: 1. Patient will tolerate least restrictive diet with no overt s/s of aspiration.                        Plan:     Patient to be seen:  2 x/week, 3 x/week   Plan of Care expires:  12/16/24  Plan of Care reviewed with:  patient, caregiver   SLP Follow-Up:  Yes       Discharge recommendations:  No Therapy Indicated   Barriers to Discharge:  None    Time Tracking:     SLP Treatment Date:   12/09/24  Speech Start Time:  1150  Speech Stop Time:  1214     Speech Total Time (min):  24 min    Billable Minutes: Eval Swallow and Oral Function 24 minutes     12/09/2024

## 2024-12-09 NOTE — ASSESSMENT & PLAN NOTE
Anemia is likely due to  unclear etiology . Most recent hemoglobin and hematocrit are listed below.  Recent Labs     12/08/24 2028   HGB 12.6*   HCT 37.2*     Plan  - Monitor serial CBC: Daily  - Transfuse PRBC if patient becomes hemodynamically unstable, symptomatic or H/H drops below 7/21.  - Patient has not received any PRBC transfusions to date  - Patient's anemia is currently stable  - TSH 1.395  -check iron studies, B12, folate, INR, occult blood

## 2024-12-09 NOTE — ASSESSMENT & PLAN NOTE
Generalized weakness that is likely multifactorial due to acute kidney injury, hypokalemia, UTI, hypernatremia with dehydration, advanced dementia, Parkinson's disease, and recent right hip fracture with debility progression  -aspiration and fall precautions  -PT, OT, ST evaluation  -palliative care consult  - consult as suspect patient may need nursing home versus hospice placement

## 2024-12-09 NOTE — ED PROVIDER NOTES
SCRIBE #1 NOTE: I, Danni Gtz, am scribing for, and in the presence of, Carlos Tsang MD. I have scribed the entire note.       History     Chief Complaint   Patient presents with    Altered Mental Status     AMS with decrease in appetite x 3 days. Hx of parkinson and alzheimer's.  Pt has been on sofa for 3 days and has been having weakness. Pt had a left femur fx 2 month ago     Review of patient's allergies indicates:  No Known Allergies      History of Present Illness     HPI    12/8/2024, 8:30 PM  History obtained from an independent historian: patient's neighbor/caregiver/POA      History of Present Illness: Jose Long is a 68 y.o. male patient with a PMHx of Alzheimer's dementia, Parkinson's disease, dyslipidemia, and HTN who presents to the Emergency Department via [facility] for evaluation of altered mental status which onset over the past 3 days. The patient's caregiver states that he has not been eating or drinking enough over the last 3 days. The patient has also not been moving around as much as he normally does. His caregiver reports a fall 2 months ago where he fractured his left femur. This has been evaluated by Orthopedic Surgery. The patient's caregiver denies any fever, emesis, diarrhea, cough, and all other sxs at this time. No further complaints or concerns at this time.       Arrival mode: [facility]    PCP: Harshad Hernandez MD        Past Medical History:  Past Medical History:   Diagnosis Date    Alzheimer's dementia     Dyslipidemia     Hypertension     Multiple allergies        Past Surgical History:  Past Surgical History:   Procedure Laterality Date    COLONOSCOPY N/A 6/29/2018    Procedure: COLONOSCOPY;  Surgeon: Kermit Leone MD;  Location: [facility] ENDO;  Service: Endoscopy;  Laterality: N/A;    COLONOSCOPY N/A 5/9/2023    Procedure: COLONOSCOPY;  Surgeon: Jazmin Kraus MD;  Location: [facility] ENDO;  Service: Endoscopy;  Laterality: N/A;         Family History:  Family History    Problem Relation Name Age of Onset    Cataracts Mother      Hypertension Mother      Alzheimer's disease Mother      Diabetes Maternal Aunt      Macular degeneration Maternal Aunt      Diabetes Paternal Grandmother      Colon cancer Father      Melanoma Neg Hx         Social History:  Social History     Tobacco Use    Smoking status: Former    Smokeless tobacco: Never   Substance and Sexual Activity    Alcohol use: No    Drug use: No    Sexual activity: Not Currently        Review of Systems     Review of Systems   Unable to perform ROS: Dementia   Constitutional:  Positive for appetite change (decreased). Negative for fever.   Respiratory:  Negative for cough.    Gastrointestinal:  Negative for diarrhea, nausea and vomiting.   Neurological:  Positive for weakness (generalized).      Physical Exam     Initial Vitals [12/08/24 1939]   BP Pulse Resp Temp SpO2   (!) 146/80 102 16 98.8 °F (37.1 °C) 98 %      MAP       --          Physical Exam  Nursing Notes and Vital Signs Reviewed.  Constitutional: Patient is in no acute distress. Well-developed and well-nourished.  Head: Atraumatic. Normocephalic.  Eyes: PERRL. EOM intact. Conjunctivae are not pale. No scleral icterus.  ENT: Mucous membranes are moist. Oropharynx is clear and symmetric.    Neck: Supple. Full ROM. No lymphadenopathy.  Cardiovascular: Regular rate. Regular rhythm. No murmurs, rubs, or gallops. Distal pulses are 2+ and symmetric.  Pulmonary/Chest: No respiratory distress. Clear to auscultation bilaterally. No wheezing or rales.  Abdominal: Soft and non-distended.  There is no tenderness.  No rebound, guarding, or rigidity. Good bowel sounds.  Genitourinary: No CVA tenderness  Musculoskeletal: Contractures to the BUE. 1+ BLE edema. No calf tenderness.  Skin: Warm and dry.  Neurological:  Alert awake. Not oriented to place or time. Follows commands. Normal speech. No acute focal neurological deficits are appreciated.     ED Course   Critical  "Care    Date/Time: 12/8/2024 10:13 PM    Performed by: Carlos Tsang MD  Authorized by: aCrlos Tsang MD  Direct patient critical care time: 17 minutes  Additional history critical care time: 5 minutes  Ordering / reviewing critical care time: 12 minutes  Documentation critical care time: 6 minutes  Consulting other physicians critical care time: 5 minutes  Consult with family critical care time: 5 minutes  Total critical care time (exclusive of procedural time) : 50 minutes  Critical care time was exclusive of separately billable procedures and treating other patients and teaching time.  Critical care was necessary to treat or prevent imminent or life-threatening deterioration of the following conditions: renal failure (DEBRA).  Critical care was time spent personally by me on the following activities: blood draw for specimens, development of treatment plan with patient or surrogate, discussions with consultants, examination of patient, interpretation of cardiac output measurements, evaluation of patient's response to treatment, ordering and review of radiographic studies, ordering and performing treatments and interventions, obtaining history from patient or surrogate, ordering and review of laboratory studies, pulse oximetry, re-evaluation of patient's condition and review of old charts.        ED Vital Signs:  Vitals:    12/08/24 1939 12/08/24 2007 12/08/24 2033 12/08/24 2132   BP: (!) 146/80   129/60   Pulse: 102 95  71   Resp: 16   20   Temp: 98.8 °F (37.1 °C)   98.6 °F (37 °C)   TempSrc: Axillary   Oral   SpO2: 98%   96%   Weight:   56.8 kg (125 lb 3.5 oz)    Height: 5' 10" (1.778 m)  5' 10" (1.778 m)     12/08/24 2233 12/09/24 0030 12/09/24 0100 12/09/24 0200   BP: 135/80 (!) 159/70     Pulse: 79 88 82 80   Resp: 15 16     Temp:  99 °F (37.2 °C)     TempSrc:  Axillary     SpO2: 98% (!) 94%     Weight:       Height:        12/09/24 0339 12/09/24 0414   BP: (!) 168/77    Pulse: 84 73   Resp: 18    Temp: 98.5 " °F (36.9 °C)    TempSrc: Oral    SpO2: 96%    Weight:     Height:         Abnormal Lab Results:  Labs Reviewed   CBC W/ AUTO DIFFERENTIAL - Abnormal       Result Value    WBC 12.53      RBC 4.11 (*)     Hemoglobin 12.6 (*)     Hematocrit 37.2 (*)     MCV 91      MCH 30.7      MCHC 33.9      RDW 11.6      Platelets 277      MPV 9.7      Immature Granulocytes 0.5      Gran # (ANC) 10.4 (*)     Immature Grans (Abs) 0.06 (*)     Lymph # 1.1      Mono # 0.8      Eos # 0.1      Baso # 0.04      nRBC 0      Gran % 83.1 (*)     Lymph % 8.6 (*)     Mono % 6.5      Eosinophil % 1.0      Basophil % 0.3      Differential Method Automated     COMPREHENSIVE METABOLIC PANEL - Abnormal    Sodium 149 (*)     Potassium 3.2 (*)     Chloride 107      CO2 29      Glucose 86      BUN 51 (*)     Creatinine 1.6 (*)     Calcium 9.4      Total Protein 7.1      Albumin 3.6      Total Bilirubin 0.7      Alkaline Phosphatase 131      AST 44 (*)     ALT 29      eGFR 46 (*)     Anion Gap 13     URINALYSIS, REFLEX TO URINE CULTURE - Abnormal    Specimen UA Urine, Clean Catch      Color, UA Yellow      Appearance, UA Clear      pH, UA 6.0      Specific Gravity, UA 1.025      Protein, UA 1+ (*)     Glucose, UA Negative      Ketones, UA 1+ (*)     Bilirubin (UA) Negative      Occult Blood UA 1+ (*)     Nitrite, UA Negative      Urobilinogen, UA 2.0-3.0 (*)     Leukocytes, UA 1+ (*)     Narrative:     Specimen Source->Urine   CK - Abnormal     (*)    URINALYSIS MICROSCOPIC - Abnormal    RBC, UA 30 (*)     WBC, UA 24 (*)     WBC Clumps, UA Rare      Bacteria Rare      Hyaline Casts, UA 1      Microscopic Comment SEE COMMENT      Narrative:     Specimen Source->Urine   INFLUENZA A & B BY MOLECULAR    Influenza A, Molecular Negative      Influenza B, Molecular Negative      Flu A & B Source Nasal swab     CULTURE, URINE   B-TYPE NATRIURETIC PEPTIDE    BNP 30     MAGNESIUM    Magnesium 2.3     TROPONIN I    Troponin I 0.017     SARS-COV-2 RNA  AMPLIFICATION, QUAL    SARS-CoV-2 RNA, Amplification, Qual Negative     TSH    TSH 1.395     PROTIME-INR    Prothrombin Time 11.6      INR 1.1     IRON AND TIBC   VITAMIN B12   FOLATE   OCCULT BLOOD X 1, STOOL   PREALBUMIN   POCT GLUCOSE    POCT Glucose 92          All Lab Results:  Results for orders placed or performed during the hospital encounter of 12/08/24   POCT glucose    Collection Time: 12/08/24  8:27 PM   Result Value Ref Range    POCT Glucose 92 70 - 110 mg/dL   CBC auto differential    Collection Time: 12/08/24  8:28 PM   Result Value Ref Range    WBC 12.53 3.90 - 12.70 K/uL    RBC 4.11 (L) 4.60 - 6.20 M/uL    Hemoglobin 12.6 (L) 14.0 - 18.0 g/dL    Hematocrit 37.2 (L) 40.0 - 54.0 %    MCV 91 82 - 98 fL    MCH 30.7 27.0 - 31.0 pg    MCHC 33.9 32.0 - 36.0 g/dL    RDW 11.6 11.5 - 14.5 %    Platelets 277 150 - 450 K/uL    MPV 9.7 9.2 - 12.9 fL    Immature Granulocytes 0.5 0.0 - 0.5 %    Gran # (ANC) 10.4 (H) 1.8 - 7.7 K/uL    Immature Grans (Abs) 0.06 (H) 0.00 - 0.04 K/uL    Lymph # 1.1 1.0 - 4.8 K/uL    Mono # 0.8 0.3 - 1.0 K/uL    Eos # 0.1 0.0 - 0.5 K/uL    Baso # 0.04 0.00 - 0.20 K/uL    nRBC 0 0 /100 WBC    Gran % 83.1 (H) 38.0 - 73.0 %    Lymph % 8.6 (L) 18.0 - 48.0 %    Mono % 6.5 4.0 - 15.0 %    Eosinophil % 1.0 0.0 - 8.0 %    Basophil % 0.3 0.0 - 1.9 %    Differential Method Automated    Comprehensive metabolic panel    Collection Time: 12/08/24  8:28 PM   Result Value Ref Range    Sodium 149 (H) 136 - 145 mmol/L    Potassium 3.2 (L) 3.5 - 5.1 mmol/L    Chloride 107 95 - 110 mmol/L    CO2 29 23 - 29 mmol/L    Glucose 86 70 - 110 mg/dL    BUN 51 (H) 8 - 23 mg/dL    Creatinine 1.6 (H) 0.5 - 1.4 mg/dL    Calcium 9.4 8.7 - 10.5 mg/dL    Total Protein 7.1 6.0 - 8.4 g/dL    Albumin 3.6 3.5 - 5.2 g/dL    Total Bilirubin 0.7 0.1 - 1.0 mg/dL    Alkaline Phosphatase 131 40 - 150 U/L    AST 44 (H) 10 - 40 U/L    ALT 29 10 - 44 U/L    eGFR 46 (A) >60 mL/min/1.73 m^2    Anion Gap 13 8 - 16 mmol/L   Brain  natriuretic peptide    Collection Time: 12/08/24  8:28 PM   Result Value Ref Range    BNP 30 0 - 99 pg/mL   Magnesium    Collection Time: 12/08/24  8:28 PM   Result Value Ref Range    Magnesium 2.3 1.6 - 2.6 mg/dL   Troponin I    Collection Time: 12/08/24  8:28 PM   Result Value Ref Range    Troponin I 0.017 0.000 - 0.026 ng/mL   CPK    Collection Time: 12/08/24  8:28 PM   Result Value Ref Range     (H) 20 - 200 U/L   TSH    Collection Time: 12/08/24  8:28 PM   Result Value Ref Range    TSH 1.395 0.400 - 4.000 uIU/mL   Influenza A & B by Molecular    Collection Time: 12/08/24  8:33 PM    Specimen: Nasopharyngeal Swab   Result Value Ref Range    Influenza A, Molecular Negative Negative    Influenza B, Molecular Negative Negative    Flu A & B Source Nasal swab    COVID-19 Rapid Screening    Collection Time: 12/08/24  8:33 PM   Result Value Ref Range    SARS-CoV-2 RNA, Amplification, Qual Negative Negative   EKG 12-lead    Collection Time: 12/08/24  8:45 PM   Result Value Ref Range    QRS Duration 82 ms    [facility] QTC Calculation 515 ms   Urinalysis, Reflex to Urine Culture Urine, Clean Catch    Collection Time: 12/08/24  9:08 PM    Specimen: Urine   Result Value Ref Range    Specimen UA Urine, Clean Catch     Color, UA Yellow Yellow, Straw, Oralia    Appearance, UA Clear Clear    pH, UA 6.0 5.0 - 8.0    Specific Gravity, UA 1.025 1.005 - 1.030    Protein, UA 1+ (A) Negative    Glucose, UA Negative Negative    Ketones, UA 1+ (A) Negative    Bilirubin (UA) Negative Negative    Occult Blood UA 1+ (A) Negative    Nitrite, UA Negative Negative    Urobilinogen, UA 2.0-3.0 (A) <2.0 EU/dL    Leukocytes, UA 1+ (A) Negative   Urinalysis Microscopic    Collection Time: 12/08/24  9:08 PM   Result Value Ref Range    RBC, UA 30 (H) 0 - 4 /hpf    WBC, UA 24 (H) 0 - 5 /hpf    WBC Clumps, UA Rare None-Rare    Bacteria Rare None-Occ /hpf    Hyaline Casts, UA 1 0-1/lpf /lpf    Microscopic Comment SEE COMMENT    Ferritin    Collection  Time: 12/08/24 11:36 PM   Result Value Ref Range    Ferritin 276 20.0 - 300.0 ng/mL   Protime-INR    Collection Time: 12/08/24 11:36 PM   Result Value Ref Range    Prothrombin Time 11.6 9.0 - 12.5 sec    INR 1.1 0.8 - 1.2         Imaging Results:  Imaging Results              X-Ray Chest AP Portable (Final result)  Result time 12/08/24 20:45:10      Final result by Jaime Lynch MD (12/08/24 20:45:10)                   Impression:      No acute abnormality.      Electronically signed by: Jaime Lynch  Date:    12/08/2024  Time:    20:45               Narrative:    EXAMINATION:  XR CHEST AP PORTABLE    CLINICAL HISTORY:  Weakness    TECHNIQUE:  Single frontal view of the chest was performed.    COMPARISON:  None    FINDINGS:  Limited exam due to positioning. the lungs are clear, with normal appearance of pulmonary vasculature and no pleural effusion or pneumothorax.    The cardiac silhouette is normal in size. The hilar and mediastinal contours are unremarkable.    Bones are intact.                                       The EKG was ordered, reviewed, and independently interpreted by the ED provider.  Interpretation time: 20:45  Rate: 88 BPM  Rhythm: Sinus rhythm with short MO  Interpretation: ST & T wave abnormality, consider lateral ischemia. No STEMI.           The Emergency Provider reviewed the vital signs and test results, which are outlined above.     ED Discussion     10:12 PM: Discussed case with Kiarra Spears MD (Hospital Medicine). Dr. Spears agrees with current care and management of pt and accepts admission.   Admitting Service: Hospital Medicine  Admitting Physician: Dr. Spears  Admit to: Inpatient Tele    10:12 PM: Re-evaluated pt. I have discussed test results, shared treatment plan, and the need for admission with patient and family at bedside. Pt and family express understanding at this time and agree with all information. All questions answered. Pt and family have no further questions or  concerns at this time. Pt is ready for admit.         Medical Decision Making  Amount and/or Complexity of Data Reviewed  Independent Historian: caregiver     Details: History obtained from the patient's neighbor/caregiver/POA secondary to the patient's history of dementia.  Labs: ordered. Decision-making details documented in ED Course.  Radiology: ordered. Decision-making details documented in ED Course.  ECG/medicine tests: ordered and independent interpretation performed. Decision-making details documented in ED Course.    Risk  Prescription drug management.  Decision regarding hospitalization.    Critical Care  Total time providing critical care: 50 minutes                ED Medication(s):  Medications   sodium chloride 0.9% flush 10 mL (has no administration in time range)   naloxone 0.4 mg/mL injection 0.02 mg (has no administration in time range)   glucose chewable tablet 16 g (has no administration in time range)   glucose chewable tablet 24 g (has no administration in time range)   glucagon (human recombinant) injection 1 mg (has no administration in time range)   dextrose 5 % and 0.45 % NaCl infusion ( Intravenous New Bag 12/9/24 0158)   enoxaparin injection 30 mg (30 mg Subcutaneous Given 12/9/24 0159)   dextrose 10% bolus 125 mL 125 mL (has no administration in time range)   dextrose 10% bolus 250 mL 250 mL (has no administration in time range)   acetaminophen suppository 650 mg (has no administration in time range)   ondansetron injection 4 mg (has no administration in time range)   hydrALAZINE injection 10 mg (has no administration in time range)   cefTRIAXone injection 2 g (has no administration in time range)   sodium chloride 0.9% bolus 1,000 mL 1,000 mL (0 mLs Intravenous Stopped 12/8/24 2300)   cefTRIAXone injection 1 g (1 g Intravenous Given 12/8/24 2200)   potassium chloride 10 mEq in 100 mL IVPB ( Intravenous Verify Only 12/9/24 0350)       Current Discharge Medication List                   Scribe Attestation:   Scribe #1: I performed the above scribed service and the documentation accurately describes the services I performed. I attest to the accuracy of the note.     Attending:   Physician Attestation Statement for Scribe #1: I, Carlos Tsang MD, personally performed the services described in this documentation, as scribed by Danni Gtz, in my presence, and it is both accurate and complete.           Clinical Impression       ICD-10-CM ICD-9-CM   1. DEBRA (acute kidney injury)  N17.9 584.9   2. Weakness generalized  R53.1 780.79   3. Weakness  R53.1 780.79   4. Hypokalemia  E87.6 276.8   5. Urinary tract infection with hematuria, site unspecified  N39.0 599.0    R31.9 599.70   6. Chest pain  R07.9 786.50       Disposition:   Disposition: Admitted  Condition: Fair         Carlos Tsang MD  12/09/24 0544

## 2024-12-09 NOTE — HPI
68-year-old white man with history of hypertension, Alzheimer's dementia, Parkinson's disease, dyslipidemia, colon polyps, anemia, chronic kidney disease stage IIIA, frequent falls, generalized weakness, right closed displaced right femur fracture October 2024 (no surgery, conservative management only), and moderate protein calorie malnutrition who was brought in by his friend/neighbor who helps to take care of the patient due to generalized weakness and poor appetite over the last 3-4 days.  Symptoms have been constant, moderate-to-severe in nature, and with no aggravating or alleviating factor identified.  Patient has been on his couch and has not been ambulating.  He has not eaten or drank anything in the past 3-4 days.  He has had no witnessed vomiting or diarrhea.  Patient is altered and unable to give any history.  All information was obtained from neighbor/friend, ER physician, and chart review.  Abnormal labs on this admission include sodium 149, potassium 3.2, BUN 51, creatinine 1.6, hemoglobin 12.6, CK level 629, and urinalysis consistent with UTI.     Last hospital admit 10/14-10/17/2024 for right hip pain status post fall.  Patient sustained a right closed displaced femur fracture.  Orthopedic surgery recommended conservative management only with weight-bearing as tolerated.  Patient was discharged to inpatient rehab at that time.

## 2024-12-09 NOTE — H&P
Formerly [facility] - Emergency Dept.  [facility] Medicine  History & Physical    Patient Name: Jose Long  MRN: [id number]  Patient Class: IP- Inpatient  Admission Date: 12/8/2024  Attending Physician:  Kiarra Spears MD  Primary Care Provider: Harshad Hernnadez MD         Patient information was obtained from caregiver / friend, ER records, and ER physician .     Subjective:     Principal Problem:Acute metabolic encephalopathy    Chief Complaint:   Chief Complaint   Patient presents with    Altered Mental Status     AMS with decrease in appetite x 3 days. Hx of parkinson and alzheimer's.  Pt has been on sofa for 3 days and has been having weakness. Pt had a left femur fx 2 month ago        HPI: 68-year-old white man with history of hypertension, Alzheimer's dementia, Parkinson's disease, dyslipidemia, colon polyps, anemia, chronic kidney disease stage IIIA, frequent falls, generalized weakness, right closed displaced right femur fracture October 2024 (no surgery, conservative management only), and moderate protein calorie malnutrition who was brought in by his friend/neighbor who helps to take care of the patient due to generalized weakness and poor appetite over the last 3-4 days.  Symptoms have been constant, moderate-to-severe in nature, and with no aggravating or alleviating factor identified.  Patient has been on his couch and has not been ambulating.  He has not eaten or drank anything in the past 3-4 days.  He has had no witnessed vomiting or diarrhea.  Patient is altered and unable to give any history.  All information was obtained from neighbor/friend, ER physician, and chart review.  Abnormal labs on this admission include sodium 149, potassium 3.2, BUN 51, creatinine 1.6, hemoglobin 12.6, CK level 629, and urinalysis consistent with UTI.    Last hospital admit 10/14-10/17/2024 for right hip pain status post fall.  Patient sustained a right closed displaced femur fracture.  Orthopedic surgery recommended  conservative management only with weight-bearing as tolerated.  Patient was discharged to inpatient rehab at that time.    Past Medical History:   Diagnosis Date    Alzheimer's dementia     Dyslipidemia     Hypertension     Multiple allergies        Past Surgical History:   Procedure Laterality Date    COLONOSCOPY N/A 6/29/2018    Procedure: COLONOSCOPY;  Surgeon: Kermit Leone MD;  Location: [facility] ENDO;  Service: Endoscopy;  Laterality: N/A;    COLONOSCOPY N/A 5/9/2023    Procedure: COLONOSCOPY;  Surgeon: Jazmin Kraus MD;  Location: [facility] ENDO;  Service: Endoscopy;  Laterality: N/A;       Review of patient's allergies indicates:  No Known Allergies    No current facility-administered medications on file prior to encounter.     Current Outpatient Medications on File Prior to Encounter   Medication Sig    acetaminophen (TYLENOL ARTHRITIS PAIN) 650 MG TbSR Take 1 tablet (650 mg total) by mouth every 8 (eight) hours as needed.    ALPRAZolam (XANAX) 0.25 MG tablet Take 1 tablet (0.25 mg total) by mouth daily as needed for Anxiety. (Patient not taking: Reported on 11/7/2024)    amlodipine-benazepril 10-20mg (LOTREL) 10-20 mg per capsule Take 1 capsule by mouth once daily.    carbidopa-levodopa  mg (SINEMET)  mg per tablet Take 1 tablet by mouth 3 (three) times daily.    cetirizine (ZYRTEC) 10 MG tablet Take 1 tablet by mouth Daily. 1 Tablet Oral Every day.  To get over-the-counter    lactulose (CHRONULAC) 20 gram/30 mL Soln Take 15 mLs (10 g total) by mouth once daily.    memantine (NAMENDA) 10 MG Tab Take 10 mg by mouth 2 (two) times daily.    QUEtiapine (SEROQUEL) 25 MG Tab Take 25 mg by mouth every evening.     Family History       Problem Relation (Age of Onset)    Alzheimer's disease Mother    Cataracts Mother    Colon cancer Father    Diabetes Maternal Aunt, Paternal Grandmother    Hypertension Mother    Macular degeneration Maternal Aunt          Tobacco Use    Smoking status: Former    Smokeless  tobacco: Never   Substance and Sexual Activity    Alcohol use: No    Drug use: No    Sexual activity: Not Currently     Review of Systems   Unable to perform ROS: Dementia (and acute metabolic encephalopathy)     Objective:     Vital Signs (Most Recent):  Temp: 98.6 °F (37 °C) (12/08/24 2132)  Pulse: 79 (12/08/24 2233)  Resp: 15 (12/08/24 2233)  BP: 135/80 (12/08/24 2233)  SpO2: 98 % (12/08/24 2233) Vital Signs (24h Range):  Temp:  [98.6 °F (37 °C)-98.8 °F (37.1 °C)] 98.6 °F (37 °C)  Pulse:  [] 79  Resp:  [15-20] 15  SpO2:  [96 %-98 %] 98 %  BP: (129-146)/(60-80) 135/80     Weight: 56.8 kg (125 lb 3.5 oz)  Body mass index is 17.97 kg/m².     Physical Exam  Vitals reviewed.   Constitutional:       Appearance: He is ill-appearing. He is not diaphoretic.      Comments: cachectic   HENT:      Nose: Nose normal.   Eyes:      Conjunctiva/sclera: Conjunctivae normal.   Cardiovascular:      Rate and Rhythm: Normal rate.   Pulmonary:      Effort: Pulmonary effort is normal. No respiratory distress.   Abdominal:      General: There is no distension.      Tenderness: There is no abdominal tenderness.   Musculoskeletal:         General: No swelling.   Skin:     General: Skin is warm and dry.   Neurological:      Mental Status: He is disoriented.      Comments: Lethargic, minimally arousable to sternal rub   Psychiatric:      Comments: Unable to assess                Significant Labs: All pertinent labs within the past 24 hours have been reviewed.  Recent Lab Results         12/08/24 2108 12/08/24 2033 12/08/24 2028 12/08/24 2027        Influenza A, Molecular   Negative           Influenza B, Molecular   Negative           Albumin     3.6         ALP     131         ALT     29         Anion Gap     13         Appearance, UA Clear             AST     44         Bacteria, UA Rare             Baso #     0.04         Basophil %     0.3         Bilirubin (UA) Negative             BILIRUBIN TOTAL     0.7  Comment: For  infants and newborns, interpretation of results should be based  on gestational age, weight and in agreement with clinical  observations.    Premature Infant recommended reference ranges:  Up to 24 hours.............<8.0 mg/dL  Up to 48 hours............<12.0 mg/dL  3-5 days..................<15.0 mg/dL  6-29 days.................<15.0 mg/dL           BNP     30  Comment: Values of less than 100 pg/ml are consistent with non-CHF populations.         BUN     51         Calcium     9.4         Chloride     107         CO2     29         Color, UA Yellow             CPK     629         Creatinine     1.6         Differential Method     Automated         eGFR     46         Eos #     0.1         Eos %     1.0         Flu A & B Source   Nasal swab           Glucose     86         Glucose, UA Negative             Gran # (ANC)     10.4         Gran %     83.1         Hematocrit     37.2         Hemoglobin     12.6         Hyaline Casts, UA 1             Immature Grans (Abs)     0.06  Comment: Mild elevation in immature granulocytes is non specific and   can be seen in a variety of conditions including stress response,   acute inflammation, trauma and pregnancy. Correlation with other   laboratory and clinical findings is essential.           Immature Granulocytes     0.5         Ketones, UA 1+             Leukocyte Esterase, UA 1+             Lymph #     1.1         Lymph %     8.6         Magnesium      2.3         MCH     30.7         MCHC     33.9         MCV     91         Microscopic Comment SEE COMMENT  Comment: Other formed elements not mentioned in the report are not   present in the microscopic examination.                Mono #     0.8         Mono %     6.5         MPV     9.7         NITRITE UA Negative             nRBC     0         Blood, UA 1+             pH, UA 6.0             Platelet Count     277         POCT Glucose       92       Potassium     3.2         PROTEIN TOTAL     7.1         Protein, UA  1+  Comment: Recommend a 24 hour urine protein or a urine   protein/creatinine ratio if globulin induced proteinuria is  clinically suspected.               RBC     4.11         RBC, UA 30             RDW     11.6         SARS-CoV-2 RNA, Amplification, Qual   Negative  Comment: This test utilizes isothermal nucleic acid amplification technology   to   detect the SARS-CoV-2 RdRp nucleic acid segment. The analytical   sensitivity   (limit of detection) is 500 copies/swab.    A POSITIVE result is indicative of the presence of SARS-CoV-2 RNA;   clinical   correlation with patient history and other diagnostic information is   necessary to determine patient infection status.    A NEGATIVE result means that SARS-CoV-2 nucleic acids are not present   above   the limit of detection. A NEGATIVE result should be treated as   presumptive.   It does not rule out the possibility of COVID-19 and should not be   the sole   basis for treatment decisions.    If COVID-19 is strongly suspected based on clinical and exposure   history,   re-testing using an alternate molecular assay should be considered.    This test is Food and Drug Administration (FDA) approved. Performance   characteristics of this has been independently verified by [facility] Department of Pathology and Laboratory Medicine.             Sodium     149         Spec Grav UA 1.025             Specimen UA Urine, Clean Catch             Troponin I     0.017  Comment: The reference interval for Troponin I represents the 99th percentile   cutoff   for our facility and is consistent with 3rd generation assay   performance.           TSH     1.395         UROBILINOGEN UA 2.0-3.0             WBC Clumps, UA Rare             WBC, UA 24             WBC     12.53                 Significant Imaging: I have reviewed all pertinent imaging results/findings within the past 24 hours.  X-Ray Chest AP Portable   Final Result      No acute abnormality.          Electronically signed by: Jaime Lynch   Date:    12/08/2024   Time:    20:45         Assessment/Plan:     * Acute metabolic encephalopathy  Acute metabolic encephalopathy that is likely multifactorial due to acute kidney injury, UTI, hypernatremia with dehydration, advanced Alzheimer's dementia, and Parkinson's disease  -white blood cell count 12.53, sodium 149, potassium 3.2, BUN 51, creatinine 1.6, influenza a/B negative, COVID-19 negative, TSH 1.395  -urinalysis was consistent with infection, urine culture pending  -chest x-ray negative  -supportive care with NPO state, IV fluids, frequent turns q.2 hours  -telemetry monitoring, pulse oximetry monitoring, p.r.n. oxygen  -aspiration precautions, speech therapy evaluation  -if patient has no improvement with IV fluids and IV antibiotics, consider imaging of brain      Acute renal failure superimposed on stage 3a chronic kidney disease  DEBRA is likely due to pre-renal azotemia due to dehydration. Baseline creatinine is  1.1 . Most recent creatinine and eGFR are listed below.  Recent Labs     12/08/24 2028   CREATININE 1.6*   EGFRNORACEVR 46*      Plan  - DEBRA is  pending reassessment  - Avoid nephrotoxins and renally dose meds for GFR listed above  - Monitor urine output, serial BMP, and adjust therapy as needed  - treat UTI  -hold Lotrel  -D5 half-normal saline at 100 mL/hr, status post 1 L normal saline IV fluid bolus given in the emergency department  -check bladder scan    Acute cystitis without hematuria  -urinalysis was consistent with UTI, urine culture pending, white blood cell count 12.53  -empiric IV Rocephin, IV fluids      Hypokalemia  Patient's most recent potassium results are listed below.   Recent Labs     12/08/24 2028   K 3.2*     Plan  - Replete potassium per protocol  - Monitor potassium Daily  - Patient's hypokalemia is  pending reassessment  - give potassium chloride 40 mEq IV x1 dose  -magnesium level 2.3  -telemetry  monitoring    Hypernatremia  Hypernatremia is likely due to Dehydration. The patient's most recent sodium results are listed below.  Recent Labs     12/08/24 2028   *     Plan  - Aim to correct the sodium by 8-10mEq in 24 hours.   - Plan to correct their hypernatremia with Select IV fluids: D5W/0.45 NaCl at a rate of 100 ml/hr.  Status post 1 L normal saline IV fluid bolus in the emergency department  - Will plan to trend the patient's sodium: Daily  - The patient's hypernatremia is  pending reassessment    Elevated CK  -CK level 629, LFTs unremarkable, creatinine 1.6, troponin negative, BNP 30  -influenza a/B and COVID-19 negative  -patient has had no recent falls per friend/neighbor who stays with the patient most of the day  -check a.m. labs to include CK level  -continue IV fluids  -patient is on no statin therapy      Moderate protein-calorie malnutrition  Nutrition consulted. Most recent weight and BMI monitored-     Measurements:  Wt Readings from Last 1 Encounters:   12/08/24 56.8 kg (125 lb 3.5 oz)   Body mass index is 17.97 kg/m².    -HIV 10/14/24  -TSH 1.395 on this admission  -check pre-albumin, phosphorus, and magnesium levels  -aspiration precautions, speech therapy evaluation  -nutrition consult  -currently NPO with D5 half-normal saline at 100 mL/hr    Weakness  Generalized weakness that is likely multifactorial due to acute kidney injury, hypokalemia, UTI, hypernatremia with dehydration, advanced dementia, Parkinson's disease, and recent right hip fracture with debility progression  -aspiration and fall precautions  -PT, OT, ST evaluation  -palliative care consult  - consult as suspect patient may need nursing home versus hospice placement      Anemia  Anemia is likely due to  unclear etiology . Most recent hemoglobin and hematocrit are listed below.  Recent Labs     12/08/24 2028   HGB 12.6*   HCT 37.2*     Plan  - Monitor serial CBC: Daily  - Transfuse PRBC if patient  becomes hemodynamically unstable, symptomatic or H/H drops below 7/21.  - Patient has not received any PRBC transfusions to date  - Patient's anemia is currently stable  - TSH 1.395  -check iron studies, B12, folate, INR, occult blood    Alzheimer's dementia  -hold Namenda and low-dose Seroquel while NPO    Dyslipidemia  -patient is on no statin therapy per home med list      HTN (hypertension)  Patient's blood pressure range in the last 24 hours was: BP  Min: 129/60  Max: 146/80.The patient's inpatient anti-hypertensive regimen is listed below:  Current Antihypertensives  hydrALAZINE injection 10 mg, Every 6 hours PRN, Intravenous    Plan  - BP is controlled, no changes needed to their regimen  - currently NPO as patient is felt to be unsafe for p.o. intake  -hold Lotrel      VTE Risk Mitigation (From admission, onward)           Ordered     enoxaparin injection 30 mg  Daily         12/08/24 2242     IP VTE HIGH RISK PATIENT  Once         12/08/24 2242     Place sequential compression device  Until discontinued         12/08/24 2242                                    Kiarra Spears MD  Department of Hospital Medicine  Formerly [facility] - Emergency Dept.

## 2024-12-09 NOTE — HOSPITAL COURSE
68-year-old white man with history of hypertension, Alzheimer's dementia, Parkinson's disease, dyslipidemia, colon polyps, anemia, chronic kidney disease stage IIIA, frequent falls, generalized weakness, right closed displaced right femur fracture October 2024 (no surgery, conservative management only), and moderate protein calorie malnutrition who was brought in by his friend/neighbor who helps to take care of the patient due to generalized weakness and poor appetite over the last 3-4 days.  Symptoms have been constant, moderate-to-severe in nature, and with no aggravating or alleviating factor identified.  Patient has been on his couch and has not been ambulating.  He has not eaten or drank anything in the past 3-4 days.  He has had no witnessed vomiting or diarrhea.  Patient is altered and unable to give any history.  All information was obtained from neighbor/friend, ER physician, and chart review.  Abnormal labs on this admission include sodium 149, potassium 3.2, BUN 51, creatinine 1.6, hemoglobin 12.6, CK level 629, and urinalysis consistent with UTI.     Last hospital admit 10/14-10/17/2024 for right hip pain status post fall.  Patient sustained a right closed displaced femur fracture.  Orthopedic surgery recommended conservative management only with weight-bearing as tolerated.  Patient was discharged to inpatient rehab at that time.    12/09/2024   Examination of patient done at bedside,; drowsy but arousable, confused;  Had extensive discussion with patient's friend who is medical power of -given patient's age, underlying comorbidities, gradual decline in general condition, acute on chronic issues, recurrent hospitalizations--opted for DNR DNI, hospice, prefers to focus on quality of life   Palliative on board, appreciate support, recommended inpatient hospice given need for IV fluids, IV pain medications, possible need for agitation/delirium;   working on arrangements for transfer to  inpatient hospice through [facility];

## 2024-12-09 NOTE — ASSESSMENT & PLAN NOTE
DEBRA is likely due to pre-renal azotemia due to dehydration. Baseline creatinine is  1.1 . Most recent creatinine and eGFR are listed below.  Recent Labs     12/08/24 2028   CREATININE 1.6*   EGFRNORACEVR 46*      Plan  - DEBRA is  pending reassessment  - Avoid nephrotoxins and renally dose meds for GFR listed above  - Monitor urine output, serial BMP, and adjust therapy as needed  - treat UTI  -hold Lotrel  -D5 half-normal saline at 100 mL/hr, status post 1 L normal saline IV fluid bolus given in the emergency department  -check bladder scan

## 2024-12-09 NOTE — ASSESSMENT & PLAN NOTE
Goals of care: comfort/supportive care  Advanced directive: DNR. Will defer [place]POST to hospice  HC POA: stated friend Ray Coronado. He will bring documents for chart today or tomorrow.   Symptoms: poor PO intake, gen weakness  Follow up: PRN. Anticipate discharge to hospice when ready

## 2024-12-09 NOTE — SUBJECTIVE & OBJECTIVE
Interval History: History obtain from CG and friend Ray Coronado who is at bedside. Describes very poor appetite, gradual decline the past year, notes very sharp decline past two months. Multiple falls, unable to care for himself, increased somnolence and confusion. No improvement with PT/rehab. In fact, he believes his overall condition worsened. States he believes Mr Long is dying and previously Mr Long expressed he wished for comfort/supportive care if he became unable to care for himself. Prior to this hospitalization Ray and Mr Long were considering [facility].     He has been considering hospice care. We discussed shifting goal of healthcare to supportive and comfort. Ray believes this is aligned with what Mr Long would choose if he were able. He chooses [facility]the [facility] due to proximity to their home.       Past Medical History:   Diagnosis Date    Alzheimer's dementia     Dyslipidemia     Hypertension     Multiple allergies        Past Surgical History:   Procedure Laterality Date    COLONOSCOPY N/A 6/29/2018    Procedure: COLONOSCOPY;  Surgeon: Kermit Leone MD;  Location: [facility];  Service: Endoscopy;  Laterality: N/A;    COLONOSCOPY N/A 5/9/2023    Procedure: COLONOSCOPY;  Surgeon: Jazmin Kraus MD;  Location: [facility];  Service: Endoscopy;  Laterality: N/A;       Review of patient's allergies indicates:  No Known Allergies    Medications:  Continuous Infusions:   D5 and 0.45% NaCl   Intravenous Continuous 100 mL/hr at 12/09/24 0158 New Bag at 12/09/24 0158     Scheduled Meds:   cefTRIAXone (Rocephin) IV (PEDS and ADULTS)  2 g Intravenous Q24H     PRN Meds:  Current Facility-Administered Medications:     acetaminophen, 650 mg, Rectal, Q6H PRN    dextrose 10%, 12.5 g, Intravenous, PRN    dextrose 10%, 25 g, Intravenous, PRN    glucagon (human recombinant), 1 mg, Intramuscular, PRN    glucose, 16 g, Oral, PRN    glucose, 24 g, Oral, PRN    hydrALAZINE, 10 mg,  Intravenous, Q6H PRN    morphine, 0.5 mg, Intravenous, Q6H PRN    naloxone, 0.02 mg, Intravenous, PRN    ondansetron, 4 mg, Intravenous, Q6H PRN    sodium chloride 0.9%, 10 mL, Intravenous, Q12H PRN    Family History       Problem Relation (Age of Onset)    Alzheimer's disease Mother    Cataracts Mother    Colon cancer Father    Diabetes Maternal Aunt, Paternal Grandmother    Hypertension Mother    Macular degeneration Maternal Aunt          Tobacco Use    Smoking status: Former    Smokeless tobacco: Never   Substance and Sexual Activity    Alcohol use: No    Drug use: No    Sexual activity: Not Currently       Review of Systems   Reason unable to perform ROS: dementia, acuity.     Objective:     Vital Signs (Most Recent):  Temp: 98.9 °F (37.2 °C) (12/09/24 1237)  Pulse: 61 (12/09/24 1420)  Resp: 17 (12/09/24 1327)  BP: (!) 159/75 (12/09/24 1420)  SpO2: 97 % (12/09/24 1237) Vital Signs (24h Range):  Temp:  [98.5 °F (36.9 °C)-99 °F (37.2 °C)] 98.9 °F (37.2 °C)  Pulse:  [] 61  Resp:  [15-20] 17  SpO2:  [94 %-98 %] 97 %  BP: (129-188)/(60-88) 159/75     Weight: 56.8 kg (125 lb 3.5 oz)  Body mass index is 17.97 kg/m².       Physical Exam  Constitutional:       Comments: Eyes closed, appears comfortable   Pulmonary:      Effort: Pulmonary effort is normal.   Musculoskeletal:      Comments: Generalized muscle wasting   Neurological:      Comments: does not arouse during limited exam.   Mild myoclonus              Review of Symptoms      Symptom Assessment (ESAS 0-10 Scale)  Unable to complete assessment due to Dementia         Pain Assessment in Advanced Demential Scale (PAINAD)   Breathing - Independent of vocalization:  0  Negative vocalization:  0  Facial expression:  0  Body language:  0  Consolability:  0  Total:  0    Performance Status:  20    Living Arrangements:  Lives with friend    Psychosocial/Cultural:   See Palliative Psychosocial Note: Yes  Friend Ray Coronado has been assisting with care for many  "years, lives nearby but stays overnight now. No close family. Not   **Primary  to Follow**  Palliative Care  Consult: Yes        Advance Care Planning   Advance Directives:   Living Will: No    [place]: No    Do Not Resuscitate Status: Yes    Medical Power of : stated HC POA is friend Ray Coronado. Encouraged to bring HC POA documents for medical record..      Decision Making:  Patient unable to communicate due to disease severity/cognitive impairment  Goals of Care: The healthcare power of   endorses that what is most important right now is to focus on symptom/pain control and comfort and QOL     Accordingly, we have decided that the best plan to meet the patient's goals includes enrolling in hospice care.          Significant Labs: All pertinent labs within the past 24 hours have been reviewed.  CBC:   Recent Labs   Lab 12/09/24  0518   WBC 10.23   HGB 11.0*   HCT 33.1*   MCV 91        BMP:  Recent Labs   Lab 12/09/24 0518   GLU 83   *   K 3.5   *   CO2 26   BUN 48*   CREATININE 1.4   CALCIUM 8.5*   MG 2.1     LFT:  Lab Results   Component Value Date    AST 41 (H) 12/09/2024    ALKPHOS 110 12/09/2024    BILITOT 0.5 12/09/2024     Albumin:   Albumin   Date Value Ref Range Status   12/09/2024 3.1 (L) 3.5 - 5.2 g/dL Final     Protein:   Total Protein   Date Value Ref Range Status   12/09/2024 6.1 6.0 - 8.4 g/dL Final     Lactic acid:   No results found for: "LACTATE"    Significant Imaging: I have reviewed all pertinent imaging results/findings within the past 24 hours.    "

## 2024-12-09 NOTE — ASSESSMENT & PLAN NOTE
-urinalysis was consistent with UTI, urine culture pending, white blood cell count 12.53  -empiric IV Rocephin, IV fluids

## 2024-12-09 NOTE — PT/OT/SLP PROGRESS
Physical Therapy      Patient Name:  Jose Long   MRN:  [id number]    Chart review performed but MD discontinued order prior to evaluation. ([id number])

## 2024-12-09 NOTE — CHAPLAIN
Initial visit with patient and friend.  Pt was sleeping throughout the visit but I did take time to assess for spiritual and emotional needs.  Pt's friend who was currently present and shared that pt would be going into hospice care.  I mentioned that this can be a difficult place to be and asked if he had any concerns that he would like to process.  Pt's friend stated that they were okay at the time and did not have any current needs.  Spiritual care remains available as needed.    Chaplain Aldair Li M.Div., [facility]

## 2024-12-09 NOTE — CONSULTS
"[facility] - Med Surg  Wound Care    Patient Name:  Jose Long   MRN:  [id number]  Date: 12/9/2024  Diagnosis: Acute metabolic encephalopathy    History:     Past Medical History:   Diagnosis Date    Alzheimer's dementia     Dyslipidemia     Hypertension     Multiple allergies        Social History     Socioeconomic History    Marital status: Single   Tobacco Use    Smoking status: Former    Smokeless tobacco: Never   Substance and Sexual Activity    Alcohol use: No    Drug use: No    Sexual activity: Not Currently     Social Drivers of Health     Financial Resource Strain: Low Risk  (12/9/2024)    Overall Financial Resource Strain (CARDIA)     Difficulty of Paying Living Expenses: Not hard at all   Food Insecurity: No Food Insecurity (12/9/2024)    Hunger Vital Sign     Worried About Running Out of Food in the Last Year: Never true     Ran Out of Food in the Last Year: Never true   Transportation Needs: No Transportation Needs (12/9/2024)    TRANSPORTATION NEEDS     Transportation : No   Physical Activity: Sufficiently Active (6/19/2019)    Exercise Vital Sign     Days of Exercise per Week: 2 days     Minutes of Exercise per Session: 90 min   Stress: No Stress Concern Present (12/9/2024)    [employer] - Occupational Stress Questionnaire     Feeling of Stress : Not at all   Housing Stability: Low Risk  (12/9/2024)    Housing Stability Vital Sign     Unable to Pay for Housing in the Last Year: No     Homeless in the Last Year: No       Precautions:     Allergies as of 12/08/2024    (No Known Allergies)       WO Assessment Details/Treatment         Consulted to evaluate wound to buttocks and L "throat".   Mr. Long is a 67 yo male patient admitted 12/8/24 with AMS and decreased appetite X 3 days PTA.  Caregiver states he has spent majority of the day lying on the couch for the past several days (states his usual activity is up in his power chair most of the day).  Diagnosed w/ acute " "metabolic encephalopathy.    PMH includes:   Alzheimer's dementia, Parkinson's disease, dyslipidemia, colon polyps, anemia, chronic kidney disease stage IIIA, frequent falls, generalized weakness, HTN, right closed displaced right femur fracture October 2024 (no surgery, conservative management only), and moderate protein calorie malnutrition.  This morning, patient is resting quietly.  He is awake but confused, mumbling unintelligible words in repetition.  Neighbor and caregiver @ bedside; states probable plan is to consult Hospice services for palliative care.  States he knows that patient has been reluctant to do this up until now; however, he states "I just can't lift him up anymore---I have a bad back and just had hernia surgery about 6 months ago".    Noted intact skin to patient's L neck w/ no evidence of open wound or any redness:    Applied Cavilon skin barrier wipe and left site FAUSTO.    Noted resolving ecchymosis to L hip:    Caregiver states this was result of his previous fall in October (in which he fractured his opposite femur to R side).  Do not feel this site is pressure related.  Applied Cavilon skin barrier to site and left open to air.    Repositioned onto his L side.  Noted intact QiVi external urinary catheter in progress to low continuous suction.    Noted POA deep tissue PI to B buttocks:  measured cluster of several sites measuring 6.5x7x0.1 cm in total.  Skin remains intact but discolored a deep maroon color, especially to R buttocks.  Periwound skin also intact but red and non-blanchable.  No exudate noted.  Recommend:  as linens under patient are damp with urine, even w/ use of QiVi catheter, will D/C foam border prevention dressing and initiate barrier paste daily after bath & prn each episode of melany-care performed.  Will order addition of low air-loss pump to current pressure redistribution mattress.  Will initiate orders for pressure injury prevention.  Noted foam border prevention " dressings intact to B heels; did NOT disturb.  Would recommend use of offloading heel boots to BLE's, though, to prevent injury from pressure.   Plan f/u later this week.       12/09/24 0925   WOCN Assessment   WOCN Total Time (mins) 30   Visit Date 12/09/24   Visit Time 0925   Consult Type New   WOCN Speciality Wound   Wound pressure   Number of Wounds 1   Intervention assessed;applied;chart review;coordination of care   Teaching on-going   Skin Interventions   Device Skin Pressure Protection absorbent pad utilized/changed   Pressure Reduction Devices pressure-redistributing mattress utilized   Pressure Reduction Techniques weight shift assistance provided   Skin Protection incontinence pads utilized   Positioning   Body Position turned;head facing, left   Head of Bed (HOB) Positioning HOB elevated   Positioning/Transfer Devices wedge;pillows;in use   Pressure Injury Prevention    Check Moisture Management Pad Done   Sacral Foam Dressing Patient incontinent, barrier cream applied   Heel protection technique Foam dressing   Check Medical Devices Done   [REMOVED]      Wound 12/09/24 0000 Other (comment) Left Throat   Final Assessment Date/Final Assessment Time: 12/09/24 0925  Date First Assessed/Time First Assessed: 12/09/24 0000   Present on Original Admission: Yes  Primary Wound Type: (c) Other (comment)  Side: Left  Location: Throat   Wound Image    Appearance Intact   Care Cleansed with:;Sterile normal saline   Periwound Care Skin barrier film applied        Wound 12/08/24 2330 Pressure Injury Buttocks   Date First Assessed/Time First Assessed: 12/08/24 2330   Present on Original Admission: Yes  Primary Wound Type: Pressure Injury  Location: Buttocks   Wound Image    Pressure Injury Stage DTPI   Dressing Appearance Intact;Dry   Drainage Amount None   Appearance Intact;Maroon   Periwound Area Intact;Redness   Wound Length (cm) 6.5 cm   Wound Width (cm) 7 cm   Wound Depth (cm) 0.1 cm   Wound Volume (cm^3) 4.55 cm^3    Wound Surface Area (cm^2) 45.5 cm^2   Care Cleansed with:;Sterile normal saline   Periwound Care Moisture barrier applied         12/09/2024

## 2024-12-09 NOTE — PLAN OF CARE
Problem: Adult Inpatient Plan of Care  Goal: Plan of Care Review  Outcome: Progressing  Goal: Patient-Specific Goal (Individualized)  Outcome: Progressing  Goal: Absence of Hospital-Acquired Illness or Injury  Outcome: Progressing  Goal: Optimal Comfort and Wellbeing  Outcome: Progressing  Goal: Readiness for Transition of Care  Outcome: Progressing     Problem: Coping Ineffective  Goal: Effective Coping  Outcome: Progressing     Problem: Acute Kidney Injury/Impairment  Goal: Fluid and Electrolyte Balance  Outcome: Progressing  Goal: Improved Oral Intake  Outcome: Progressing  Goal: Effective Renal Function  Outcome: Progressing     Problem: Skin Injury Risk Increased  Goal: Skin Health and Integrity  Outcome: Progressing     Problem: Wound  Goal: Optimal Coping  Outcome: Progressing  Goal: Optimal Functional Ability  Outcome: Progressing  Goal: Absence of Infection Signs and Symptoms  Outcome: Progressing  Goal: Improved Oral Intake  Outcome: Progressing  Goal: Optimal Pain Control and Function  Outcome: Progressing  Goal: Skin Health and Integrity  Outcome: Progressing  Goal: Optimal Wound Healing  Outcome: Progressing

## 2024-12-09 NOTE — SUBJECTIVE & OBJECTIVE
Past Medical History:   Diagnosis Date    Alzheimer's dementia     Dyslipidemia     Hypertension     Multiple allergies        Past Surgical History:   Procedure Laterality Date    COLONOSCOPY N/A 6/29/2018    Procedure: COLONOSCOPY;  Surgeon: Kermit Leone MD;  Location: [facility] ENDO;  Service: Endoscopy;  Laterality: N/A;    COLONOSCOPY N/A 5/9/2023    Procedure: COLONOSCOPY;  Surgeon: Jazmin Kraus MD;  Location: [facility] ENDO;  Service: Endoscopy;  Laterality: N/A;       Review of patient's allergies indicates:  No Known Allergies    No current facility-administered medications on file prior to encounter.     Current Outpatient Medications on File Prior to Encounter   Medication Sig    acetaminophen (TYLENOL ARTHRITIS PAIN) 650 MG TbSR Take 1 tablet (650 mg total) by mouth every 8 (eight) hours as needed.    ALPRAZolam (XANAX) 0.25 MG tablet Take 1 tablet (0.25 mg total) by mouth daily as needed for Anxiety. (Patient not taking: Reported on 11/7/2024)    amlodipine-benazepril 10-20mg (LOTREL) 10-20 mg per capsule Take 1 capsule by mouth once daily.    carbidopa-levodopa  mg (SINEMET)  mg per tablet Take 1 tablet by mouth 3 (three) times daily.    cetirizine (ZYRTEC) 10 MG tablet Take 1 tablet by mouth Daily. 1 Tablet Oral Every day.  To get over-the-counter    lactulose (CHRONULAC) 20 gram/30 mL Soln Take 15 mLs (10 g total) by mouth once daily.    memantine (NAMENDA) 10 MG Tab Take 10 mg by mouth 2 (two) times daily.    QUEtiapine (SEROQUEL) 25 MG Tab Take 25 mg by mouth every evening.     Family History       Problem Relation (Age of Onset)    Alzheimer's disease Mother    Cataracts Mother    Colon cancer Father    Diabetes Maternal Aunt, Paternal Grandmother    Hypertension Mother    Macular degeneration Maternal Aunt          Tobacco Use    Smoking status: Former    Smokeless tobacco: Never   Substance and Sexual Activity    Alcohol use: No    Drug use: No    Sexual activity: Not Currently      Review of Systems   Unable to perform ROS: Dementia (and acute metabolic encephalopathy)     Objective:     Vital Signs (Most Recent):  Temp: 98.6 °F (37 °C) (12/08/24 2132)  Pulse: 79 (12/08/24 2233)  Resp: 15 (12/08/24 2233)  BP: 135/80 (12/08/24 2233)  SpO2: 98 % (12/08/24 2233) Vital Signs (24h Range):  Temp:  [98.6 °F (37 °C)-98.8 °F (37.1 °C)] 98.6 °F (37 °C)  Pulse:  [] 79  Resp:  [15-20] 15  SpO2:  [96 %-98 %] 98 %  BP: (129-146)/(60-80) 135/80     Weight: 56.8 kg (125 lb 3.5 oz)  Body mass index is 17.97 kg/m².     Physical Exam  Vitals reviewed.   Constitutional:       Appearance: He is ill-appearing. He is not diaphoretic.      Comments: cachectic   HENT:      Nose: Nose normal.   Eyes:      Conjunctiva/sclera: Conjunctivae normal.   Cardiovascular:      Rate and Rhythm: Normal rate.   Pulmonary:      Effort: Pulmonary effort is normal. No respiratory distress.   Abdominal:      General: There is no distension.      Tenderness: There is no abdominal tenderness.   Musculoskeletal:         General: No swelling.   Skin:     General: Skin is warm and dry.   Neurological:      Mental Status: He is disoriented.      Comments: Lethargic, minimally arousable to sternal rub   Psychiatric:      Comments: Unable to assess                Significant Labs: All pertinent labs within the past 24 hours have been reviewed.  Recent Lab Results         12/08/24 2108 12/08/24 2033 12/08/24 2028 12/08/24 2027        Influenza A, Molecular   Negative           Influenza B, Molecular   Negative           Albumin     3.6         ALP     131         ALT     29         Anion Gap     13         Appearance, UA Clear             AST     44         Bacteria, UA Rare             Baso #     0.04         Basophil %     0.3         Bilirubin (UA) Negative             BILIRUBIN TOTAL     0.7  Comment: For infants and newborns, interpretation of results should be based  on gestational age, weight and in agreement with  clinical  observations.    Premature Infant recommended reference ranges:  Up to 24 hours.............<8.0 mg/dL  Up to 48 hours............<12.0 mg/dL  3-5 days..................<15.0 mg/dL  6-29 days.................<15.0 mg/dL           BNP     30  Comment: Values of less than 100 pg/ml are consistent with non-CHF populations.         BUN     51         Calcium     9.4         Chloride     107         CO2     29         Color, UA Yellow             CPK     629         Creatinine     1.6         Differential Method     Automated         eGFR     46         Eos #     0.1         Eos %     1.0         Flu A & B Source   Nasal swab           Glucose     86         Glucose, UA Negative             Gran # (ANC)     10.4         Gran %     83.1         Hematocrit     37.2         Hemoglobin     12.6         Hyaline Casts, UA 1             Immature Grans (Abs)     0.06  Comment: Mild elevation in immature granulocytes is non specific and   can be seen in a variety of conditions including stress response,   acute inflammation, trauma and pregnancy. Correlation with other   laboratory and clinical findings is essential.           Immature Granulocytes     0.5         Ketones, UA 1+             Leukocyte Esterase, UA 1+             Lymph #     1.1         Lymph %     8.6         Magnesium      2.3         MCH     30.7         MCHC     33.9         MCV     91         Microscopic Comment SEE COMMENT  Comment: Other formed elements not mentioned in the report are not   present in the microscopic examination.                Mono #     0.8         Mono %     6.5         MPV     9.7         NITRITE UA Negative             nRBC     0         Blood, UA 1+             pH, UA 6.0             Platelet Count     277         POCT Glucose       92       Potassium     3.2         PROTEIN TOTAL     7.1         Protein, UA 1+  Comment: Recommend a 24 hour urine protein or a urine   protein/creatinine ratio if globulin induced proteinuria  is  clinically suspected.               RBC     4.11         RBC, UA 30             RDW     11.6         SARS-CoV-2 RNA, Amplification, Qual   Negative  Comment: This test utilizes isothermal nucleic acid amplification technology   to   detect the SARS-CoV-2 RdRp nucleic acid segment. The analytical   sensitivity   (limit of detection) is 500 copies/swab.    A POSITIVE result is indicative of the presence of SARS-CoV-2 RNA;   clinical   correlation with patient history and other diagnostic information is   necessary to determine patient infection status.    A NEGATIVE result means that SARS-CoV-2 nucleic acids are not present   above   the limit of detection. A NEGATIVE result should be treated as   presumptive.   It does not rule out the possibility of COVID-19 and should not be   the sole   basis for treatment decisions.    If COVID-19 is strongly suspected based on clinical and exposure   history,   re-testing using an alternate molecular assay should be considered.    This test is Food and Drug Administration (FDA) approved. Performance   characteristics of this has been independently verified by [facility] Department of Pathology and Laboratory Medicine.             Sodium     149         Spec Grav UA 1.025             Specimen UA Urine, Clean Catch             Troponin I     0.017  Comment: The reference interval for Troponin I represents the 99th percentile   cutoff   for our facility and is consistent with 3rd generation assay   performance.           TSH     1.395         UROBILINOGEN UA 2.0-3.0             WBC Clumps, UA Rare             WBC, UA 24             WBC     12.53                 Significant Imaging: I have reviewed all pertinent imaging results/findings within the past 24 hours.  X-Ray Chest AP Portable   Final Result      No acute abnormality.         Electronically signed by: Jaime Lynch   Date:    12/08/2024   Time:    20:45

## 2024-12-09 NOTE — ASSESSMENT & PLAN NOTE
-CK level 629, LFTs unremarkable, creatinine 1.6, troponin negative, BNP 30  -influenza a/B and COVID-19 negative  -patient has had no recent falls per friend/neighbor who stays with the patient most of the day  -check a.m. labs to include CK level  -continue IV fluids  -patient is on no statin therapy

## 2024-12-09 NOTE — ASSESSMENT & PLAN NOTE
Patient's blood pressure range in the last 24 hours was: BP  Min: 129/60  Max: 146/80.The patient's inpatient anti-hypertensive regimen is listed below:  Current Antihypertensives  hydrALAZINE injection 10 mg, Every 6 hours PRN, Intravenous    Plan  - BP is controlled, no changes needed to their regimen  - currently NPO as patient is felt to be unsafe for p.o. intake  -hold Lotrel

## 2024-12-09 NOTE — ASSESSMENT & PLAN NOTE
Patient's most recent potassium results are listed below.   Recent Labs     12/08/24 2028   K 3.2*     Plan  - Replete potassium per protocol  - Monitor potassium Daily  - Patient's hypokalemia is  pending reassessment  - give potassium chloride 40 mEq IV x1 dose  -magnesium level 2.3  -telemetry monitoring

## 2024-12-09 NOTE — DISCHARGE SUMMARY
[facility]  Discharge Summary      Patient Name: Jose Long  MRN: [id number]  BENITA: [id number]  Patient Class: IP- Inpatient  Admission Date: 12/8/2024  Hospital Length of Stay: 1 days  Discharge Date and Time: 12/9/24  Attending Physician: Deni Darden,*   Discharging Provider: Deni Darden MD  Primary Care Provider: Harshad Hernandez MD    Primary Care Team: Networked reference to record PCT     HPI:   68-year-old white man with history of hypertension, Alzheimer's dementia, Parkinson's disease, dyslipidemia, colon polyps, anemia, chronic kidney disease stage IIIA, frequent falls, generalized weakness, right closed displaced right femur fracture October 2024 (no surgery, conservative management only), and moderate protein calorie malnutrition who was brought in by his friend/neighbor who helps to take care of the patient due to generalized weakness and poor appetite over the last 3-4 days.  Symptoms have been constant, moderate-to-severe in nature, and with no aggravating or alleviating factor identified.  Patient has been on his couch and has not been ambulating.  He has not eaten or drank anything in the past 3-4 days.  He has had no witnessed vomiting or diarrhea.  Patient is altered and unable to give any history.  All information was obtained from neighbor/friend, ER physician, and chart review.  Abnormal labs on this admission include sodium 149, potassium 3.2, BUN 51, creatinine 1.6, hemoglobin 12.6, CK level 629, and urinalysis consistent with UTI.    Last hospital admit 10/14-10/17/2024 for right hip pain status post fall.  Patient sustained a right closed displaced femur fracture.  Orthopedic surgery recommended conservative management only with weight-bearing as tolerated.  Patient was discharged to inpatient rehab at that time.    * No surgery found *      Hospital Course:    68-year-old white man with history of hypertension, Alzheimer's  dementia, Parkinson's disease, dyslipidemia, colon polyps, anemia, chronic kidney disease stage IIIA, frequent falls, generalized weakness, right closed displaced right femur fracture October 2024 (no surgery, conservative management only), and moderate protein calorie malnutrition who was brought in by his friend/neighbor who helps to take care of the patient due to generalized weakness and poor appetite over the last 3-4 days.  Symptoms have been constant, moderate-to-severe in nature, and with no aggravating or alleviating factor identified.  Patient has been on his couch and has not been ambulating.  He has not eaten or drank anything in the past 3-4 days.  He has had no witnessed vomiting or diarrhea.  Patient is altered and unable to give any history.  All information was obtained from neighbor/friend, ER physician, and chart review.  Abnormal labs on this admission include sodium 149, potassium 3.2, BUN 51, creatinine 1.6, hemoglobin 12.6, CK level 629, and urinalysis consistent with UTI.     Last hospital admit 10/14-10/17/2024 for right hip pain status post fall.  Patient sustained a right closed displaced femur fracture.  Orthopedic surgery recommended conservative management only with weight-bearing as tolerated.  Patient was discharged to inpatient rehab at that time.    12/09/2024   Examination of patient done at bedside,; drowsy but arousable, confused;  Had extensive discussion with patient's friend who is medical power of -given patient's age, underlying comorbidities, gradual decline in general condition, acute on chronic issues, recurrent hospitalizations--opted for DNR DNI, hospice, prefers to focus on quality of life   Palliative on board, appreciate support, recommended inpatient hospice given need for IV fluids, IV pain medications, possible need for agitation/delirium;   working on arrangements for transfer to inpatient hospice through [facility];          Goals of Care  Treatment Preferences:  Code Status: DNR      SDOH Screening:  The patient was screened for utility difficulties, food insecurity, transport difficulties, housing insecurity, and interpersonal safety and there were no concerns identified this admission.     Consults:   Consults (From admission, onward)          Status Ordering Provider     Inpatient consult to Social Work  Once        Provider:  (Not yet assigned)    Completed DENVER CHAUDHARY     Inpatient consult to Social Work  Once        Provider:  (Not yet assigned)    Completed MAYCO OLIVARES     Inpatient consult to Social Work  Once        Provider:  (Not yet assigned)    Completed KIARRA SPEARS     Inpatient consult to Registered Dietitian/Nutritionist  Once        Provider:  (Not yet assigned)    Acknowledged KIARRA SPEARS     Inpatient consult to Palliative Care  Once        Provider:  (Not yet assigned)    Acknowledged KIARRA SPEARS     Inpatient consult to Hospitalist  Once        Provider:  Kiarra Spears MD    Acknowledged JOSE PELAYO            * Acute metabolic encephalopathy  Acute metabolic encephalopathy that is likely multifactorial due to acute kidney injury, UTI, hypernatremia with dehydration, advanced Alzheimer's dementia, and Parkinson's disease  -white blood cell count 12.53, sodium 149, potassium 3.2, BUN 51, creatinine 1.6, influenza a/B negative, COVID-19 negative, TSH 1.395  -urinalysis was consistent with infection, urine culture pending  -chest x-ray negative  -supportive care with NPO state, IV fluids, frequent turns q.2 hours  -telemetry monitoring, pulse oximetry monitoring, p.r.n. oxygen  -aspiration precautions, speech therapy evaluation  -if patient has no improvement with IV fluids and IV antibiotics, consider imaging of brain      Elevated CK  -CK level 629, LFTs unremarkable, creatinine 1.6, troponin negative, BNP 30  -influenza a/B and COVID-19 negative  -patient has had no recent falls per  friend/neighbor who stays with the patient most of the day  -check a.m. labs to include CK level  -continue IV fluids  -patient is on no statin therapy      Hypernatremia  Hypernatremia is likely due to Dehydration. The patient's most recent sodium results are listed below.  Recent Labs     12/08/24 2028   *     Plan  - Aim to correct the sodium by 8-10mEq in 24 hours.   - Plan to correct their hypernatremia with Select IV fluids: D5W/0.45 NaCl at a rate of 100 ml/hr.  Status post 1 L normal saline IV fluid bolus in the emergency department  - Will plan to trend the patient's sodium: Daily  - The patient's hypernatremia is  pending reassessment    Acute cystitis without hematuria  -urinalysis was consistent with UTI, urine culture pending, white blood cell count 12.53  -empiric IV Rocephin, IV fluids      Hypokalemia  Patient's most recent potassium results are listed below.   Recent Labs     12/08/24 2028   K 3.2*     Plan  - Replete potassium per protocol  - Monitor potassium Daily  - Patient's hypokalemia is  pending reassessment  - give potassium chloride 40 mEq IV x1 dose  -magnesium level 2.3  -telemetry monitoring    Acute renal failure superimposed on stage 3a chronic kidney disease  DEBRA is likely due to pre-renal azotemia due to dehydration. Baseline creatinine is  1.1 . Most recent creatinine and eGFR are listed below.  Recent Labs     12/08/24 2028   CREATININE 1.6*   EGFRNORACEVR 46*      Plan  - DEBRA is  pending reassessment  - Avoid nephrotoxins and renally dose meds for GFR listed above  - Monitor urine output, serial BMP, and adjust therapy as needed  - treat UTI  -hold Lotrel  -D5 half-normal saline at 100 mL/hr, status post 1 L normal saline IV fluid bolus given in the emergency department  -check bladder scan    Moderate protein-calorie malnutrition  Nutrition consulted. Most recent weight and BMI monitored-     Measurements:  Wt Readings from Last 1 Encounters:   12/08/24 56.8 kg (125 lb  3.5 oz)   Body mass index is 17.97 kg/m².    -HIV 10/14/24  -TSH 1.395 on this admission  -check pre-albumin, phosphorus, and magnesium levels  -aspiration precautions, speech therapy evaluation  -nutrition consult  -currently NPO with D5 half-normal saline at 100 mL/hr    Weakness  Generalized weakness that is likely multifactorial due to acute kidney injury, hypokalemia, UTI, hypernatremia with dehydration, advanced dementia, Parkinson's disease, and recent right hip fracture with debility progression  -aspiration and fall precautions  -PT, OT, ST evaluation  -palliative care consult  - consult as suspect patient may need nursing home versus hospice placement      Anemia  Anemia is likely due to  unclear etiology . Most recent hemoglobin and hematocrit are listed below.  Recent Labs     12/08/24 2028   HGB 12.6*   HCT 37.2*     Plan  - Monitor serial CBC: Daily  - Transfuse PRBC if patient becomes hemodynamically unstable, symptomatic or H/H drops below 7/21.  - Patient has not received any PRBC transfusions to date  - Patient's anemia is currently stable  - TSH 1.395  -check iron studies, B12, folate, INR, occult blood    Alzheimer's dementia  -hold Namenda and low-dose Seroquel while NPO    Dyslipidemia  -patient is on no statin therapy per home med list      HTN (hypertension)  Patient's blood pressure range in the last 24 hours was: BP  Min: 129/60  Max: 146/80.The patient's inpatient anti-hypertensive regimen is listed below:  Current Antihypertensives  hydrALAZINE injection 10 mg, Every 6 hours PRN, Intravenous    Plan  - BP is controlled, no changes needed to their regimen  - currently NPO as patient is felt to be unsafe for p.o. intake  -hold Lotrel      Final Active Diagnoses:    Diagnosis Date Noted POA    PRINCIPAL PROBLEM:  Acute metabolic encephalopathy [G93.41] 12/08/2024 Yes    Acute renal failure superimposed on stage 3a chronic kidney disease [N17.9, N18.31] 12/08/2024 Yes     Hypokalemia [E87.6] 12/08/2024 Yes    Acute cystitis without hematuria [N30.00] 12/08/2024 Yes    Hypernatremia [E87.0] 12/08/2024 Yes    Elevated CK [R74.8] 12/08/2024 Yes    Moderate protein-calorie malnutrition [E44.0] 10/17/2024 Yes    Weakness [R53.1] 10/14/2024 Yes    Anemia [D64.9] 02/27/2024 Yes    Alzheimer's dementia [G30.9, F02.80] 11/02/2022 Yes    Dyslipidemia [E78.5]  Yes    HTN (hypertension) [I10] 08/09/2013 Yes     Chronic      Problems Resolved During this Admission:       Discharged Condition: poor    Disposition: Hospice/Medical Facility    Follow Up:   Contact information for follow-up providers       Harshad Hernandez MD Follow up in 1 week(s).    Specialty: Internal Medicine  Contact information:  [address] [address] [phone]                       Contact information for after-discharge care       Destination       [facility] .    Service: Inpatient Hospice  Contact information:  [address]  [facility] [address] [phone]                                 Patient Instructions:   No discharge procedures on file.    Significant Diagnostic Studies:     Results for orders placed or performed during the hospital encounter of 12/08/24   POCT glucose    Collection Time: 12/08/24  8:27 PM   Result Value Ref Range    POCT Glucose 92 70 - 110 mg/dL   CBC auto differential    Collection Time: 12/08/24  8:28 PM   Result Value Ref Range    WBC 12.53 3.90 - 12.70 K/uL    RBC 4.11 (L) 4.60 - 6.20 M/uL    Hemoglobin 12.6 (L) 14.0 - 18.0 g/dL    Hematocrit 37.2 (L) 40.0 - 54.0 %    MCV 91 82 - 98 fL    MCH 30.7 27.0 - 31.0 pg    MCHC 33.9 32.0 - 36.0 g/dL    RDW 11.6 11.5 - 14.5 %    Platelets 277 150 - 450 K/uL    MPV 9.7 9.2 - 12.9 fL    Immature Granulocytes 0.5 0.0 - 0.5 %    Gran # (ANC) 10.4 (H) 1.8 - 7.7 K/uL    Immature Grans (Abs) 0.06 (H) 0.00 - 0.04 K/uL    Lymph # 1.1 1.0 - 4.8 K/uL    Mono # 0.8 0.3 - 1.0 K/uL    Eos  # 0.1 0.0 - 0.5 K/uL    Baso # 0.04 0.00 - 0.20 K/uL    nRBC 0 0 /100 WBC    Gran % 83.1 (H) 38.0 - 73.0 %    Lymph % 8.6 (L) 18.0 - 48.0 %    Mono % 6.5 4.0 - 15.0 %    Eosinophil % 1.0 0.0 - 8.0 %    Basophil % 0.3 0.0 - 1.9 %    Differential Method Automated    Comprehensive metabolic panel    Collection Time: 12/08/24  8:28 PM   Result Value Ref Range    Sodium 149 (H) 136 - 145 mmol/L    Potassium 3.2 (L) 3.5 - 5.1 mmol/L    Chloride 107 95 - 110 mmol/L    CO2 29 23 - 29 mmol/L    Glucose 86 70 - 110 mg/dL    BUN 51 (H) 8 - 23 mg/dL    Creatinine 1.6 (H) 0.5 - 1.4 mg/dL    Calcium 9.4 8.7 - 10.5 mg/dL    Total Protein 7.1 6.0 - 8.4 g/dL    Albumin 3.6 3.5 - 5.2 g/dL    Total Bilirubin 0.7 0.1 - 1.0 mg/dL    Alkaline Phosphatase 131 40 - 150 U/L    AST 44 (H) 10 - 40 U/L    ALT 29 10 - 44 U/L    eGFR 46 (A) >60 mL/min/1.73 m^2    Anion Gap 13 8 - 16 mmol/L   Brain natriuretic peptide    Collection Time: 12/08/24  8:28 PM   Result Value Ref Range    BNP 30 0 - 99 pg/mL   Magnesium    Collection Time: 12/08/24  8:28 PM   Result Value Ref Range    Magnesium 2.3 1.6 - 2.6 mg/dL   Troponin I    Collection Time: 12/08/24  8:28 PM   Result Value Ref Range    Troponin I 0.017 0.000 - 0.026 ng/mL   CPK    Collection Time: 12/08/24  8:28 PM   Result Value Ref Range     (H) 20 - 200 U/L   TSH    Collection Time: 12/08/24  8:28 PM   Result Value Ref Range    TSH 1.395 0.400 - 4.000 uIU/mL   Influenza A & B by Molecular    Collection Time: 12/08/24  8:33 PM    Specimen: Nasopharyngeal Swab   Result Value Ref Range    Influenza A, Molecular Negative Negative    Influenza B, Molecular Negative Negative    Flu A & B Source Nasal swab    COVID-19 Rapid Screening    Collection Time: 12/08/24  8:33 PM   Result Value Ref Range    SARS-CoV-2 RNA, Amplification, Qual Negative Negative   EKG 12-lead    Collection Time: 12/08/24  8:45 PM   Result Value Ref Range    QRS Duration 82 ms    OHS QTC Calculation 515 ms   Urinalysis,  Reflex to Urine Culture Urine, Clean Catch    Collection Time: 12/08/24  9:08 PM    Specimen: Urine   Result Value Ref Range    Specimen UA Urine, Clean Catch     Color, UA Yellow Yellow, Straw, Oralia    Appearance, UA Clear Clear    pH, UA 6.0 5.0 - 8.0    Specific Gravity, UA 1.025 1.005 - 1.030    Protein, UA 1+ (A) Negative    Glucose, UA Negative Negative    Ketones, UA 1+ (A) Negative    Bilirubin (UA) Negative Negative    Occult Blood UA 1+ (A) Negative    Nitrite, UA Negative Negative    Urobilinogen, UA 2.0-3.0 (A) <2.0 EU/dL    Leukocytes, UA 1+ (A) Negative   Urinalysis Microscopic    Collection Time: 12/08/24  9:08 PM   Result Value Ref Range    RBC, UA 30 (H) 0 - 4 /hpf    WBC, UA 24 (H) 0 - 5 /hpf    WBC Clumps, UA Rare None-Rare    Bacteria Rare None-Occ /hpf    Hyaline Casts, UA 1 0-1/lpf /lpf    Microscopic Comment SEE COMMENT    Iron and TIBC    Collection Time: 12/08/24 11:36 PM   Result Value Ref Range    Iron 16 (L) 45 - 160 ug/dL    Transferrin 147 (L) 200 - 375 mg/dL    TIBC 218 (L) 250 - 450 ug/dL    Saturated Iron 7 (L) 20 - 50 %   Ferritin    Collection Time: 12/08/24 11:36 PM   Result Value Ref Range    Ferritin 276 20.0 - 300.0 ng/mL   Vitamin B12    Collection Time: 12/08/24 11:36 PM   Result Value Ref Range    Vitamin B-12 426 210 - 950 pg/mL   Folate    Collection Time: 12/08/24 11:36 PM   Result Value Ref Range    Folate 15.2 4.0 - 24.0 ng/mL   Protime-INR    Collection Time: 12/08/24 11:36 PM   Result Value Ref Range    Prothrombin Time 11.6 9.0 - 12.5 sec    INR 1.1 0.8 - 1.2   CBC Auto Differential    Collection Time: 12/09/24  5:18 AM   Result Value Ref Range    WBC 10.23 3.90 - 12.70 K/uL    RBC 3.63 (L) 4.60 - 6.20 M/uL    Hemoglobin 11.0 (L) 14.0 - 18.0 g/dL    Hematocrit 33.1 (L) 40.0 - 54.0 %    MCV 91 82 - 98 fL    MCH 30.3 27.0 - 31.0 pg    MCHC 33.2 32.0 - 36.0 g/dL    RDW 11.7 11.5 - 14.5 %    Platelets 259 150 - 450 K/uL    MPV 10.1 9.2 - 12.9 fL    Immature Granulocytes  0.3 0.0 - 0.5 %    Gran # (ANC) 8.4 (H) 1.8 - 7.7 K/uL    Immature Grans (Abs) 0.03 0.00 - 0.04 K/uL    Lymph # 1.0 1.0 - 4.8 K/uL    Mono # 0.7 0.3 - 1.0 K/uL    Eos # 0.1 0.0 - 0.5 K/uL    Baso # 0.03 0.00 - 0.20 K/uL    nRBC 0 0 /100 WBC    Gran % 82.2 (H) 38.0 - 73.0 %    Lymph % 10.0 (L) 18.0 - 48.0 %    Mono % 6.7 4.0 - 15.0 %    Eosinophil % 0.5 0.0 - 8.0 %    Basophil % 0.3 0.0 - 1.9 %    Differential Method Automated    Comprehensive metabolic panel    Collection Time: 12/09/24  5:18 AM   Result Value Ref Range    Sodium 149 (H) 136 - 145 mmol/L    Potassium 3.5 3.5 - 5.1 mmol/L    Chloride 111 (H) 95 - 110 mmol/L    CO2 26 23 - 29 mmol/L    Glucose 83 70 - 110 mg/dL    BUN 48 (H) 8 - 23 mg/dL    Creatinine 1.4 0.5 - 1.4 mg/dL    Calcium 8.5 (L) 8.7 - 10.5 mg/dL    Total Protein 6.1 6.0 - 8.4 g/dL    Albumin 3.1 (L) 3.5 - 5.2 g/dL    Total Bilirubin 0.5 0.1 - 1.0 mg/dL    Alkaline Phosphatase 110 40 - 150 U/L    AST 41 (H) 10 - 40 U/L    ALT 23 10 - 44 U/L    eGFR 54 (A) >60 mL/min/1.73 m^2    Anion Gap 12 8 - 16 mmol/L   Magnesium    Collection Time: 12/09/24  5:18 AM   Result Value Ref Range    Magnesium 2.1 1.6 - 2.6 mg/dL   Phosphorus    Collection Time: 12/09/24  5:18 AM   Result Value Ref Range    Phosphorus 3.6 2.7 - 4.5 mg/dL   CK    Collection Time: 12/09/24  5:18 AM   Result Value Ref Range     (H) 20 - 200 U/L        Imaging Results              X-Ray Chest AP Portable (Final result)  Result time 12/08/24 20:45:10      Final result by Jaime Lynch MD (12/08/24 20:45:10)                   Impression:      No acute abnormality.      Electronically signed by: Jaime Lynch  Date:    12/08/2024  Time:    20:45               Narrative:    EXAMINATION:  XR CHEST AP PORTABLE    CLINICAL HISTORY:  Weakness    TECHNIQUE:  Single frontal view of the chest was performed.    COMPARISON:  None    FINDINGS:  Limited exam due to positioning. the lungs are clear, with normal appearance of pulmonary  vasculature and no pleural effusion or pneumothorax.    The cardiac silhouette is normal in size. The hilar and mediastinal contours are unremarkable.    Bones are intact.                                       Pending Diagnostic Studies:       Procedure Component Value Units Date/Time    Prealbumin [[id number]] Collected: 12/08/24 4546    Order Status: Sent Lab Status: No result     Specimen: Blood            Medications:       Medication List        START taking these medications      cefdinir 300 MG capsule  Commonly known as: OMNICEF  Take 1 capsule (300 mg total) by mouth every 12 (twelve) hours. for 3 days            CONTINUE taking these medications      acetaminophen 650 MG Tbsr  Commonly known as: TYLENOL ARTHRITIS PAIN  Take 1 tablet (650 mg total) by mouth every 8 (eight) hours as needed.     amlodipine-benazepril 10-20mg 10-20 mg per capsule  Commonly known as: LOTREL  Take 1 capsule by mouth once daily.     carbidopa-levodopa  mg  mg per tablet  Commonly known as: SINEMET     cetirizine 10 MG tablet  Commonly known as: ZYRTEC     lactulose 20 gram/30 mL Soln  Commonly known as: CHRONULAC  Take 15 mLs (10 g total) by mouth once daily.     memantine 10 MG Tab  Commonly known as: NAMENDA     QUEtiapine 25 MG Tab  Commonly known as: SEROQUEL            STOP taking these medications      ALPRAZolam 0.25 MG tablet  Commonly known as: XANAX               Where to Get Your Medications        You can get these medications from any pharmacy    Bring a paper prescription for each of these medications  cefdinir 300 MG capsule          Indwelling Lines/Drains at time of discharge:   Lines/Drains/Airways       None                   Time spent on the discharge of patient: 95 minutes         Neftaliapple Darden MD  Department of Hospital Medicine  [facility]

## 2024-12-09 NOTE — ASSESSMENT & PLAN NOTE
Hypernatremia is likely due to Dehydration. The patient's most recent sodium results are listed below.  Recent Labs     12/08/24 2028   *     Plan  - Aim to correct the sodium by 8-10mEq in 24 hours.   - Plan to correct their hypernatremia with Select IV fluids: D5W/0.45 NaCl at a rate of 100 ml/hr.  Status post 1 L normal saline IV fluid bolus in the emergency department  - Will plan to trend the patient's sodium: Daily  - The patient's hypernatremia is  pending reassessment

## 2024-12-09 NOTE — PLAN OF CARE
[facility] Med [facility]  Discharge Final Note    Primary Care Provider: Harshad Hernandez MD    Expected Discharge Date: 12/9/2024    Final Discharge Note (most recent)       Final Note - 12/09/24 1215          Final Note    Assessment Type Final Discharge Note     Anticipated Discharge Disposition Hospice/Medical Facility     Hospital Resources/Appts/Education Provided Post-Acute resouces added to AVS        Post-Acute Status    Post-Acute Authorization Hospice     Hospice Status --   pending consents being signed    Discharge Delays --   consents and transportation arrangements                    After-discharge care                Destination       [facility]   Service: Inpatient Hospice    [address], [address]  [place] [facility] [address]   Phone: [phone]                     Plan for pt to d/c today to [facility].     Araceli with [facility] to meet with pt today to sign consents.

## 2024-12-09 NOTE — PLAN OF CARE
Problem: Adult Inpatient Plan of Care  Goal: Plan of Care Review  Outcome: Progressing  Goal: Patient-Specific Goal (Individualized)  Outcome: Progressing  Goal: Absence of Hospital-Acquired Illness or Injury  Outcome: Progressing  Goal: Optimal Comfort and Wellbeing  Outcome: Progressing  Goal: Readiness for Transition of Care  Outcome: Progressing     Problem: Coping Ineffective  Goal: Effective Coping  Outcome: Progressing

## 2024-12-09 NOTE — CONSULTS
[facility]  Palliative Medicine  Consult Note    Patient Name: Jose Long  MRN: [id number]  Admission Date: 12/8/2024  Hospital Length of Stay: 1 days  Code Status: DNR   Attending Provider: Deni Darden,*  Consulting Provider: Yulissa Varela NP  Primary Care Physician: Harshad Hernandez MD  Principal Problem:Acute metabolic encephalopathy    Patient information was obtained from caregiver / friend and primary team.      Inpatient consult to Palliative Care  Consult performed by: Yulissa Varela NP  Consult ordered by: Kiarra Spears MD  Reason for consult: GOC/ACP  Assessment/Recommendations: Mr Barrow CG desires comfort/supportive care since these were wishes expressed by pt if he were unable to care for himself.   Plans to discharge to inpatient hospice. CG chooses [facility]. DNR confirmed.   Ray Coronado is stated HC POA; he will bring documents for medical record.   I'll sign off. Thank you for this consult.         Assessment/Plan:     Neuro  * Acute metabolic encephalopathy  No significant improvement despite medical tx.   CG/HC POA opts for supportive care    Alzheimer's dementia  FAST 7d-7e.   Believe GOC best met by support of hospice.   CG chooses [facility].       Endocrine  Moderate protein-calorie malnutrition  R/T advanced dementia  Supportive care    Palliative Care  Encounter for palliative care  Goals of care: comfort/supportive care  Advanced directive: DNR. Will defer [place] to hospice  HC POA: stated friend Ray Coronado. He will bring documents for chart today or tomorrow.   Symptoms: poor PO intake, gen weakness  Follow up: PRN. Anticipate discharge to hospice when ready          Thank you for your consult. I will sign off. Please contact us if you have any additional questions.    Subjective:     HPI:   68-year-old white man with history of hypertension, Alzheimer's dementia, Parkinson's disease, dyslipidemia, colon polyps, anemia, chronic kidney disease  stage IIIA, frequent falls, generalized weakness, right closed displaced right femur fracture October 2024 (no surgery, conservative management only), and moderate protein calorie malnutrition who was brought in by his friend/neighbor who helps to take care of the patient due to generalized weakness and poor appetite over the last 3-4 days.  Symptoms have been constant, moderate-to-severe in nature, and with no aggravating or alleviating factor identified.  Patient has been on his couch and has not been ambulating.  He has not eaten or drank anything in the past 3-4 days.  He has had no witnessed vomiting or diarrhea.  Patient is altered and unable to give any history.  All information was obtained from neighbor/friend, ER physician, and chart review.  Abnormal labs on this admission include sodium 149, potassium 3.2, BUN 51, creatinine 1.6, hemoglobin 12.6, CK level 629, and urinalysis consistent with UTI.     Last hospital admit 10/14-10/17/2024 for right hip pain status post fall.  Patient sustained a right closed displaced femur fracture.  Orthopedic surgery recommended conservative management only with weight-bearing as tolerated.  Patient was discharged to inpatient rehab at that time.          Interval History: History obtain from CG and friend Ray Coronado who is at bedside. Describes very poor appetite, gradual decline the past year, notes very sharp decline past two months. Multiple falls, unable to care for himself, increased somnolence and confusion. No improvement with PT/rehab. In fact, he believes his overall condition worsened. States he believes Mr Long is dying and previously Mr Lnog expressed he wished for comfort/supportive care if he became unable to care for himself. Prior to this hospitalization Ray and Mr Long were considering [facility].     He has been considering hospice care. We discussed shifting goal of healthcare to supportive and comfort. Ray believes this is aligned  with what Mr Long would choose if he were able. He [facility] due to proximity to their home.       Past Medical History:   Diagnosis Date    Alzheimer's dementia     Dyslipidemia     Hypertension     Multiple allergies        Past Surgical History:   Procedure Laterality Date    COLONOSCOPY N/A 6/29/2018    Procedure: COLONOSCOPY;  Surgeon: Kermit Leone MD;  Location: [facility] ENDO;  Service: Endoscopy;  Laterality: N/A;    COLONOSCOPY N/A 5/9/2023    Procedure: COLONOSCOPY;  Surgeon: Jazmin Kraus MD;  Location: [facility] ENDO;  Service: Endoscopy;  Laterality: N/A;       Review of patient's allergies indicates:  No Known Allergies    Medications:  Continuous Infusions:   D5 and 0.45% NaCl   Intravenous Continuous 100 mL/hr at 12/09/24 0158 New Bag at 12/09/24 0158     Scheduled Meds:   cefTRIAXone (Rocephin) IV (PEDS and ADULTS)  2 g Intravenous Q24H     PRN Meds:  Current Facility-Administered Medications:     acetaminophen, 650 mg, Rectal, Q6H PRN    dextrose 10%, 12.5 g, Intravenous, PRN    dextrose 10%, 25 g, Intravenous, PRN    glucagon (human recombinant), 1 mg, Intramuscular, PRN    glucose, 16 g, Oral, PRN    glucose, 24 g, Oral, PRN    hydrALAZINE, 10 mg, Intravenous, Q6H PRN    morphine, 0.5 mg, Intravenous, Q6H PRN    naloxone, 0.02 mg, Intravenous, PRN    ondansetron, 4 mg, Intravenous, Q6H PRN    sodium chloride 0.9%, 10 mL, Intravenous, Q12H PRN    Family History       Problem Relation (Age of Onset)    Alzheimer's disease Mother    Cataracts Mother    Colon cancer Father    Diabetes Maternal Aunt, Paternal Grandmother    Hypertension Mother    Macular degeneration Maternal Aunt          Tobacco Use    Smoking status: Former    Smokeless tobacco: Never   Substance and Sexual Activity    Alcohol use: No    Drug use: No    Sexual activity: Not Currently       Review of Systems   Reason unable to perform ROS: dementia, acuity.     Objective:     Vital Signs (Most  Recent):  Temp: 98.9 °F (37.2 °C) (12/09/24 1237)  Pulse: 61 (12/09/24 1420)  Resp: 17 (12/09/24 1327)  BP: (!) 159/75 (12/09/24 1420)  SpO2: 97 % (12/09/24 1237) Vital Signs (24h Range):  Temp:  [98.5 °F (36.9 °C)-99 °F (37.2 °C)] 98.9 °F (37.2 °C)  Pulse:  [] 61  Resp:  [15-20] 17  SpO2:  [94 %-98 %] 97 %  BP: (129-188)/(60-88) 159/75     Weight: 56.8 kg (125 lb 3.5 oz)  Body mass index is 17.97 kg/m².       Physical Exam  Constitutional:       Comments: Eyes closed, appears comfortable   Pulmonary:      Effort: Pulmonary effort is normal.   Musculoskeletal:      Comments: Generalized muscle wasting   Neurological:      Comments: does not arouse during limited exam.   Mild myoclonus              Review of Symptoms      Symptom Assessment (ESAS 0-10 Scale)  Unable to complete assessment due to Dementia         Pain Assessment in Advanced Demential Scale (PAINAD)   Breathing - Independent of vocalization:  0  Negative vocalization:  0  Facial expression:  0  Body language:  0  Consolability:  0  Total:  0    Performance Status:  20    Living Arrangements:  Lives with friend    Psychosocial/Cultural:   See Palliative Psychosocial Note: Yes  Friend Ray Coronado has been assisting with care for many years, lives nearby but stays overnight now. No close family. Not   **Primary  to Follow**  Palliative Care  Consult: Yes        Advance Care Planning  Advance Directives:   Living Will: No    [place]: No    Do Not Resuscitate Status: Yes    Medical Power of : stated HC POA is friend Ray Coronado. Encouraged to bring HC POA documents for medical record..      Decision Making:  Patient unable to communicate due to disease severity/cognitive impairment  Goals of Care: The healthcare power of   endorses that what is most important right now is to focus on symptom/pain control and comfort and QOL     Accordingly, we have decided that the best plan to meet the patient's goals  "includes enrolling in hospice care.          Significant Labs: All pertinent labs within the past 24 hours have been reviewed.  CBC:   Recent Labs   Lab 12/09/24 0518   WBC 10.23   HGB 11.0*   HCT 33.1*   MCV 91        BMP:  Recent Labs   Lab 12/09/24 0518   GLU 83   *   K 3.5   *   CO2 26   BUN 48*   CREATININE 1.4   CALCIUM 8.5*   MG 2.1     LFT:  Lab Results   Component Value Date    AST 41 (H) 12/09/2024    ALKPHOS 110 12/09/2024    BILITOT 0.5 12/09/2024     Albumin:   Albumin   Date Value Ref Range Status   12/09/2024 3.1 (L) 3.5 - 5.2 g/dL Final     Protein:   Total Protein   Date Value Ref Range Status   12/09/2024 6.1 6.0 - 8.4 g/dL Final     Lactic acid:   No results found for: "LACTATE"    Significant Imaging: I have reviewed all pertinent imaging results/findings within the past 24 hours.      I spent a total of 65 minutes on the day of the visit. This includes face to face time in discussion of goals of care, symptom assessment, coordination of care and emotional support.  This also includes non-face to face time preparing to see the patient (eg, review of tests/imaging), obtaining and/or reviewing separately obtained history, documenting clinical information in the electronic or other health record, independently interpreting results and communicating results to the patient/family/caregiver, or care coordinator.    Additional 25 min time spent on a voluntary advance care planning and /or goals of care discussion, providing emotional support, formulating and communicating prognosis and exploring burden/benefit of various approaches of treatment.       Yulissa Varela NP  Palliative Medicine  [facility]              "

## 2024-12-09 NOTE — CONSULTS
Sw sent referral to [facility]; pending consents signed and transfer to [facility].

## 2024-12-09 NOTE — ASSESSMENT & PLAN NOTE
Acute metabolic encephalopathy that is likely multifactorial due to acute kidney injury, UTI, hypernatremia with dehydration, advanced Alzheimer's dementia, and Parkinson's disease  -white blood cell count 12.53, sodium 149, potassium 3.2, BUN 51, creatinine 1.6, influenza a/B negative, COVID-19 negative, TSH 1.395  -urinalysis was consistent with infection, urine culture pending  -chest x-ray negative  -supportive care with NPO state, IV fluids, frequent turns q.2 hours  -telemetry monitoring, pulse oximetry monitoring, p.r.n. oxygen  -aspiration precautions, speech therapy evaluation  -if patient has no improvement with IV fluids and IV antibiotics, consider imaging of brain

## 2024-12-10 LAB — BACTERIA UR CULT: NO GROWTH

## 2024-12-14 LAB
BACTERIA BLD CULT: NORMAL
BACTERIA BLD CULT: NORMAL

## 2025-05-27 ENCOUNTER — PATIENT OUTREACH (OUTPATIENT)
Dept: ADMINISTRATIVE | Facility: HOSPITAL | Age: 70
End: 2025-05-27
Payer: COMMERCIAL

## 2025-05-27 NOTE — PROGRESS NOTES
BR  pts without date of death report - uploaded obituary to media. Updated care teams and demographics